# Patient Record
Sex: MALE | Race: WHITE | NOT HISPANIC OR LATINO | Employment: FULL TIME | URBAN - METROPOLITAN AREA
[De-identification: names, ages, dates, MRNs, and addresses within clinical notes are randomized per-mention and may not be internally consistent; named-entity substitution may affect disease eponyms.]

---

## 2017-04-01 DIAGNOSIS — I10 ESSENTIAL HYPERTENSION: ICD-10-CM

## 2017-04-03 RX ORDER — LISINOPRIL 10 MG/1
TABLET ORAL
Qty: 90 TABLET | Refills: 0 | OUTPATIENT
Start: 2017-04-03

## 2017-09-18 ENCOUNTER — OFFICE VISIT (OUTPATIENT)
Dept: INTERNAL MEDICINE | Facility: CLINIC | Age: 57
End: 2017-09-18
Payer: COMMERCIAL

## 2017-09-18 VITALS
SYSTOLIC BLOOD PRESSURE: 130 MMHG | OXYGEN SATURATION: 94 % | HEIGHT: 70 IN | BODY MASS INDEX: 34.53 KG/M2 | WEIGHT: 241.19 LBS | HEART RATE: 66 BPM | DIASTOLIC BLOOD PRESSURE: 82 MMHG

## 2017-09-18 DIAGNOSIS — Z12.11 ENCOUNTER FOR SCREENING COLONOSCOPY: ICD-10-CM

## 2017-09-18 DIAGNOSIS — Z00.00 ROUTINE HEALTH MAINTENANCE: Primary | ICD-10-CM

## 2017-09-18 DIAGNOSIS — Z23 NEED FOR TETANUS BOOSTER: ICD-10-CM

## 2017-09-18 DIAGNOSIS — Z11.59 NEED FOR HEPATITIS C SCREENING TEST: ICD-10-CM

## 2017-09-18 DIAGNOSIS — I10 ESSENTIAL HYPERTENSION: ICD-10-CM

## 2017-09-18 PROCEDURE — 99999 PR PBB SHADOW E&M-EST. PATIENT-LVL III: CPT | Mod: PBBFAC,,, | Performed by: INTERNAL MEDICINE

## 2017-09-18 PROCEDURE — 99386 PREV VISIT NEW AGE 40-64: CPT | Mod: S$GLB,,, | Performed by: INTERNAL MEDICINE

## 2017-09-18 RX ORDER — LISINOPRIL 20 MG/1
20 TABLET ORAL DAILY
Qty: 30 TABLET | Refills: 0 | Status: SHIPPED | OUTPATIENT
Start: 2017-09-18 | End: 2017-10-03 | Stop reason: DRUGHIGH

## 2017-09-18 NOTE — PROGRESS NOTES
Subjective:       Patient ID: Jenni Prescott Jr. is a 57 y.o. male.    Chief Complaint: Annual Exam (new pt )    HPI Mr. Prescott is a 57 year old male with hypertension who presents to hospitals care. Mr. Prescott has had very elevated blood pressures in the past such as 150s-160s systolic, 90s-100s diastolic. His last primary care provider started him on lisinopril 10 mg daily and had to increase this to 20 mg daily when his BP continued to be 150s/110s. Today, he reports that he hasn't taken lisinopril in a few weeks. His recent BP checks at home have been similar to our BP here today (130/80s). I repeated a manual BP and the reading was 140/85. He denied any issues when he took the lisinopril such as low Bps or dizziness. He has no CV complaints such as chest pain, leg swelling, shortness of breath, wheezing.    No complaints today.    Review of Systems   Constitutional: Negative for chills and fever.   HENT: Negative for rhinorrhea, sinus pain and sinus pressure.    Eyes: Negative for pain and redness.   Respiratory: Negative for cough, choking, shortness of breath and wheezing.    Cardiovascular: Negative for chest pain and leg swelling.   Gastrointestinal: Negative for diarrhea, nausea and vomiting.   Genitourinary: Negative for difficulty urinating, dysuria and hematuria.   Musculoskeletal: Negative for joint swelling.   Skin: Negative for color change and rash.   Neurological: Negative for dizziness, seizures and headaches.   Psychiatric/Behavioral: Negative for suicidal ideas. The patient is not nervous/anxious.        Objective:      Physical Exam   Constitutional: He is oriented to person, place, and time. He appears well-developed and well-nourished. No distress.   HENT:   Head: Normocephalic and atraumatic.   Right Ear: External ear normal.   Left Ear: External ear normal.   Nose: Nose normal.   Mouth/Throat: Oropharynx is clear and moist. No oropharyngeal exudate.   Eyes: Conjunctivae and EOM are  normal. Pupils are equal, round, and reactive to light. Right eye exhibits no discharge. Left eye exhibits no discharge. No scleral icterus.   Neck: Neck supple. No tracheal deviation present. No thyromegaly present.   Cardiovascular: Normal rate, regular rhythm, normal heart sounds and intact distal pulses.  Exam reveals no gallop and no friction rub.    No murmur heard.  Pulmonary/Chest: Effort normal and breath sounds normal. No respiratory distress. He has no wheezes. He has no rales.   Abdominal: Soft. Bowel sounds are normal. He exhibits no distension and no mass. There is no tenderness. There is no rebound and no guarding.   Musculoskeletal: He exhibits no edema or deformity.   Lymphadenopathy:     He has no cervical adenopathy.   Neurological: He is alert and oriented to person, place, and time.   Skin: Skin is warm and dry. He is not diaphoretic.   Psychiatric: He has a normal mood and affect. His behavior is normal. Judgment and thought content normal.       Assessment:       1. Routine health maintenance    2. Essential hypertension    3. Encounter for screening colonoscopy    4. Need for tetanus booster    5. Need for hepatitis C screening test        Plan:     1. Routine health maintenance  -Lipids, CBC, CMP, A1C, Hep C antibody  -Discussed with patient that there is not a general concsensus regarding prostate screening. While prostate cancer may be detected earlier, there is also the chance of false positive which would lead to urological evaluation and work up.  -After discussing the pros and cons, Mr. Prescott would like to proceed with PSA screening  -Screening colonoscopy ordered    2. Essential HTN  -Patient's goal <140/90  -Have trended his Bps and most were above goal  -Will restart lisinopril 20 mg daily  -Patient will check his BP at home and keep a log which he will return to clinic with in 2 weeks  -Advised to call if any questions or concerns. And to let us know if he becomes dizzy and/or has  low BP at home  -RTC 2 weeks for BP check      RTC 2 weeks

## 2017-09-25 ENCOUNTER — TELEPHONE (OUTPATIENT)
Dept: INTERNAL MEDICINE | Facility: CLINIC | Age: 57
End: 2017-09-25

## 2017-09-25 NOTE — TELEPHONE ENCOUNTER
Called pt no answer left voicemail that we don't have forms to renew cdl and that [facility] one block from [employer] [facility] Address: [address] [place] [address]  Hours: Open today · 8AM-5PM  Phone: [phone]   does the cdl. I left address, phone numbers, and hours of business on voicemail message.

## 2017-09-25 NOTE — TELEPHONE ENCOUNTER
----- Message from Nelida Blunt sent at 9/25/2017  8:37 AM CDT -----  Contact: mrs. polanco [phone]  Mrs. polanco states patient was seen last for a physical and would like to know if you have the forms to fill out  To renew cdl , please call

## 2017-09-26 ENCOUNTER — LAB VISIT (OUTPATIENT)
Dept: LAB | Facility: HOSPITAL | Age: 57
End: 2017-09-26
Attending: INTERNAL MEDICINE
Payer: COMMERCIAL

## 2017-09-26 ENCOUNTER — CLINICAL SUPPORT (OUTPATIENT)
Dept: FAMILY MEDICINE | Facility: CLINIC | Age: 57
End: 2017-09-26
Payer: COMMERCIAL

## 2017-09-26 DIAGNOSIS — Z00.00 ROUTINE HEALTH MAINTENANCE: ICD-10-CM

## 2017-09-26 DIAGNOSIS — I10 ESSENTIAL HYPERTENSION: ICD-10-CM

## 2017-09-26 DIAGNOSIS — Z11.59 NEED FOR HEPATITIS C SCREENING TEST: ICD-10-CM

## 2017-09-26 LAB
ALBUMIN SERPL BCP-MCNC: 4 G/DL
ALP SERPL-CCNC: 72 U/L
ALT SERPL W/O P-5'-P-CCNC: 31 U/L
ANION GAP SERPL CALC-SCNC: 9 MMOL/L
AST SERPL-CCNC: 22 U/L
BASOPHILS # BLD AUTO: 0.03 K/UL
BASOPHILS NFR BLD: 0.4 %
BILIRUB SERPL-MCNC: 1.1 MG/DL
BUN SERPL-MCNC: 21 MG/DL
CALCIUM SERPL-MCNC: 9.1 MG/DL
CHLORIDE SERPL-SCNC: 106 MMOL/L
CHOLEST SERPL-MCNC: 206 MG/DL
CHOLEST/HDLC SERPL: 6.2 {RATIO}
CO2 SERPL-SCNC: 27 MMOL/L
COMPLEXED PSA SERPL-MCNC: 0.61 NG/ML
CREAT SERPL-MCNC: 0.9 MG/DL
DIFFERENTIAL METHOD: ABNORMAL
EOSINOPHIL # BLD AUTO: 0.1 K/UL
EOSINOPHIL NFR BLD: 1.2 %
ERYTHROCYTE [DISTWIDTH] IN BLOOD BY AUTOMATED COUNT: 14.6 %
EST. GFR  (AFRICAN AMERICAN): >60 ML/MIN/1.73 M^2
EST. GFR  (NON AFRICAN AMERICAN): >60 ML/MIN/1.73 M^2
ESTIMATED AVG GLUCOSE: 117 MG/DL
GLUCOSE SERPL-MCNC: 108 MG/DL
HBA1C MFR BLD HPLC: 5.7 %
HCT VFR BLD AUTO: 46.8 %
HDLC SERPL-MCNC: 33 MG/DL
HDLC SERPL: 16 %
HGB BLD-MCNC: 15.5 G/DL
LDLC SERPL CALC-MCNC: 127.6 MG/DL
LYMPHOCYTES # BLD AUTO: 2.2 K/UL
LYMPHOCYTES NFR BLD: 29 %
MCH RBC QN AUTO: 29.6 PG
MCHC RBC AUTO-ENTMCNC: 33.1 G/DL
MCV RBC AUTO: 90 FL
MONOCYTES # BLD AUTO: 0.5 K/UL
MONOCYTES NFR BLD: 6.6 %
NEUTROPHILS # BLD AUTO: 4.9 K/UL
NEUTROPHILS NFR BLD: 62.7 %
NONHDLC SERPL-MCNC: 173 MG/DL
PLATELET # BLD AUTO: 198 K/UL
PMV BLD AUTO: 11 FL
POTASSIUM SERPL-SCNC: 4.3 MMOL/L
PROT SERPL-MCNC: 7.2 G/DL
RBC # BLD AUTO: 5.23 M/UL
SODIUM SERPL-SCNC: 142 MMOL/L
TRIGL SERPL-MCNC: 227 MG/DL
WBC # BLD AUTO: 7.73 K/UL

## 2017-09-26 PROCEDURE — 83036 HEMOGLOBIN GLYCOSYLATED A1C: CPT

## 2017-09-26 PROCEDURE — 90715 TDAP VACCINE 7 YRS/> IM: CPT | Mod: S$GLB,,, | Performed by: INTERNAL MEDICINE

## 2017-09-26 PROCEDURE — 80061 LIPID PANEL: CPT

## 2017-09-26 PROCEDURE — 84153 ASSAY OF PSA TOTAL: CPT

## 2017-09-26 PROCEDURE — 86803 HEPATITIS C AB TEST: CPT

## 2017-09-26 PROCEDURE — 36415 COLL VENOUS BLD VENIPUNCTURE: CPT | Mod: PO

## 2017-09-26 PROCEDURE — 90471 IMMUNIZATION ADMIN: CPT | Mod: S$GLB,,, | Performed by: INTERNAL MEDICINE

## 2017-09-26 PROCEDURE — 80053 COMPREHEN METABOLIC PANEL: CPT

## 2017-09-26 PROCEDURE — 85025 COMPLETE CBC W/AUTO DIFF WBC: CPT

## 2017-09-27 LAB — HCV AB SERPL QL IA: NEGATIVE

## 2017-10-03 ENCOUNTER — OFFICE VISIT (OUTPATIENT)
Dept: INTERNAL MEDICINE | Facility: CLINIC | Age: 57
End: 2017-10-03
Payer: COMMERCIAL

## 2017-10-03 ENCOUNTER — TELEPHONE (OUTPATIENT)
Dept: GASTROENTEROLOGY | Facility: CLINIC | Age: 57
End: 2017-10-03

## 2017-10-03 ENCOUNTER — TELEPHONE (OUTPATIENT)
Dept: INTERNAL MEDICINE | Facility: CLINIC | Age: 57
End: 2017-10-03

## 2017-10-03 VITALS
SYSTOLIC BLOOD PRESSURE: 160 MMHG | DIASTOLIC BLOOD PRESSURE: 100 MMHG | HEIGHT: 70 IN | HEART RATE: 72 BPM | WEIGHT: 241 LBS | BODY MASS INDEX: 34.5 KG/M2

## 2017-10-03 DIAGNOSIS — E78.5 HYPERLIPIDEMIA, UNSPECIFIED HYPERLIPIDEMIA TYPE: ICD-10-CM

## 2017-10-03 DIAGNOSIS — I10 ESSENTIAL HYPERTENSION: Primary | ICD-10-CM

## 2017-10-03 DIAGNOSIS — R73.03 PREDIABETES: ICD-10-CM

## 2017-10-03 PROCEDURE — 99999 PR PBB SHADOW E&M-EST. PATIENT-LVL III: CPT | Mod: PBBFAC,,, | Performed by: INTERNAL MEDICINE

## 2017-10-03 PROCEDURE — 99213 OFFICE O/P EST LOW 20 MIN: CPT | Mod: S$GLB,,, | Performed by: INTERNAL MEDICINE

## 2017-10-03 RX ORDER — LISINOPRIL AND HYDROCHLOROTHIAZIDE 12.5; 2 MG/1; MG/1
1 TABLET ORAL DAILY
Qty: 90 TABLET | Refills: 3 | Status: SHIPPED | OUTPATIENT
Start: 2017-10-03 | End: 2017-11-08 | Stop reason: SDUPTHER

## 2017-10-03 RX ORDER — ATORVASTATIN CALCIUM 20 MG/1
20 TABLET, FILM COATED ORAL DAILY
Qty: 90 TABLET | Refills: 3 | Status: SHIPPED | OUTPATIENT
Start: 2017-10-03 | End: 2018-10-22 | Stop reason: SDUPTHER

## 2017-10-03 NOTE — TELEPHONE ENCOUNTER
----- Message from Radha Antoine MD sent at 10/3/2017  8:49 AM CDT -----  Do we have any way to check on Mr. Prescott's colonoscopy scheduling? He was seen 2 weeks ago and says he still doesn't have appt.    Thank you!

## 2017-10-03 NOTE — TELEPHONE ENCOUNTER
----- Message from Zee Oneil LPN sent at 10/3/2017 10:52 AM CDT -----  Contact: Self [phone] or [phone]      ----- Message -----  From: Yue Echols  Sent: 10/3/2017   9:30 AM  To: Sean Vera Staff    Patient is calling to schedule a colonoscopy. Please advice

## 2017-10-03 NOTE — TELEPHONE ENCOUNTER
Called endo at  [phone] who gave me endo at [place] number [phone] where Katerin gave me the doctors office number [phone] & [phone] where I spoke to Yue and  they send a message to the endocrinologists who calls to schedule the appt. Yue azar has sent a message to the doctor who will call pt to schedule colonoscopy appt.

## 2017-10-03 NOTE — PROGRESS NOTES
Subjective:       Patient ID: Jenni Prescott Jr. is a 57 y.o. male.    Chief Complaint: Follow-up    HPI Mr. Prescott is a 57 year old male with a history of hypertension which was diagnosed about 5 years ago. Since that time, he has only taken lisinopril for treatment. During our last visit 2 weeks ago, I increased his dose back to lisinopril 20 mg. He says he has been taking the medication since that time with no missed doses. When he checks his BP at home, it is still high. The lowest he has ever seen the systolic was in the 130s. His diastolic is usually in the 100-120s range. He denies any symptoms during these times such as dizziness, headaches, ligthheadedness, vision changes, chest pain. He has no symptoms currently and feels fine today. Office Bps today were 160/100 and 170/95.    While Mr. Prescott was in the office, we have reviewed his labs from last visit. We have discussed diet and exercise as this could improve his blood pressure, A1c and lipid values. He does not eat fried foods. But he does eat large portions of food and drinks alcohol. He still stop drinking and will reduce food portions. He does not do any exercise currently but he used to run 5 miles per day. He will start exercising by walking for 30 minutes per day and working up to 5 days per week.    Review of Systems   Constitutional: Negative for fever.   HENT: Negative for rhinorrhea.    Eyes: Negative for visual disturbance.   Respiratory: Negative for shortness of breath.    Cardiovascular: Positive for palpitations. Negative for chest pain.   Gastrointestinal: Negative for nausea and vomiting.   Genitourinary: Negative for dysuria and hematuria.   Musculoskeletal: Negative for back pain and joint swelling.   Skin: Negative for rash.   Neurological: Negative for dizziness, light-headedness and headaches.       Objective:      Physical Exam   Constitutional: He is oriented to person, place, and time. He appears well-developed and  well-nourished. No distress.   HENT:   Head: Normocephalic and atraumatic.   Eyes: Conjunctivae and EOM are normal. Right eye exhibits no discharge. Left eye exhibits no discharge.   Cardiovascular: Normal rate, regular rhythm, normal heart sounds and intact distal pulses.  Exam reveals no gallop and no friction rub.    No murmur heard.  Pulmonary/Chest: Effort normal and breath sounds normal. No respiratory distress. He has no wheezes. He has no rales.   Musculoskeletal: He exhibits no edema.   Neurological: He is alert and oriented to person, place, and time.   Skin: Skin is warm and dry. He is not diaphoretic.   Psychiatric: He has a normal mood and affect. His behavior is normal. Judgment and thought content normal.   Vitals reviewed.      Assessment:       1. Essential hypertension    2. Hyperlipidemia, unspecified hyperlipidemia type    3. Prediabetes        Plan:     1. Essential hypertension  -Mr. Prescott has stage II hypertension  -Adding HCTZ (12.5 mg) to his lisinopril  -Counseled him on signs/symptoms of low blood pressure (dizziness, sweating, pre-syncope, syncope) and elevated BP (chest pain, shortness of breath, vision changes, headaches) and he is aware to seek medical attention if these occur.  -He will follow up in one week for a blood pressure check. If still elevated, can increase the lisinopril dose or the HCTZ dose as there is room to increase for both.    2. HLD  -ASCVD 10 year risk is 14%  -Starting moderate intensity statin (atorvastatin 20 mg)  -LFTs WNL   -Patient counseled on potential side effects of this medication such as muscle cramps, soreness    3. Pre-diabetes  -A1C 5.7  -Discussed the importance of improving diet (fewer starches, discontinue alcohol, portion control) and exercising in order to improve his A1C number     RTC in one week for BP check.   If elevated, I will see Mr. Prescott and adjust his medications. If not elevated, plan to make appt for 3 months follow up with lipids  done at the 3 month follow up visit

## 2017-10-09 ENCOUNTER — TELEPHONE (OUTPATIENT)
Dept: INTERNAL MEDICINE | Facility: CLINIC | Age: 57
End: 2017-10-09

## 2017-10-09 NOTE — TELEPHONE ENCOUNTER
Called pt, left voicemail adv Dr. Antoine is out of the office and appt for tomorrow is canceled but can be scheduled with another provider, left number [phone] for pt to call back regarding scheduling another appt.

## 2017-10-31 ENCOUNTER — TELEPHONE (OUTPATIENT)
Dept: GASTROENTEROLOGY | Facility: CLINIC | Age: 57
End: 2017-10-31

## 2017-10-31 NOTE — TELEPHONE ENCOUNTER
Referral was sent from Dr. Antoine to schedule patient for an Colonoscopy, patient states that they will call back at a later time.

## 2017-11-08 ENCOUNTER — OFFICE VISIT (OUTPATIENT)
Dept: FAMILY MEDICINE | Facility: CLINIC | Age: 57
End: 2017-11-08
Payer: COMMERCIAL

## 2017-11-08 VITALS
SYSTOLIC BLOOD PRESSURE: 142 MMHG | DIASTOLIC BLOOD PRESSURE: 88 MMHG | HEIGHT: 70 IN | BODY MASS INDEX: 32.67 KG/M2 | TEMPERATURE: 98 F | OXYGEN SATURATION: 96 % | HEART RATE: 54 BPM | WEIGHT: 228.19 LBS

## 2017-11-08 DIAGNOSIS — K64.8 HEMORRHOIDS, INTERNAL, WITH BLEEDING: ICD-10-CM

## 2017-11-08 DIAGNOSIS — M79.2 NEUROPATHIC PAIN: ICD-10-CM

## 2017-11-08 DIAGNOSIS — E78.5 HYPERLIPIDEMIA, UNSPECIFIED HYPERLIPIDEMIA TYPE: ICD-10-CM

## 2017-11-08 DIAGNOSIS — R89.9 ABNORMAL LABORATORY TEST RESULT: ICD-10-CM

## 2017-11-08 DIAGNOSIS — Z12.11 COLON CANCER SCREENING: ICD-10-CM

## 2017-11-08 DIAGNOSIS — I10 ESSENTIAL HYPERTENSION: Primary | ICD-10-CM

## 2017-11-08 DIAGNOSIS — R73.01 ELEVATED FASTING GLUCOSE: ICD-10-CM

## 2017-11-08 PROCEDURE — 99999 PR PBB SHADOW E&M-EST. PATIENT-LVL IV: CPT | Mod: PBBFAC,,, | Performed by: FAMILY MEDICINE

## 2017-11-08 PROCEDURE — 99215 OFFICE O/P EST HI 40 MIN: CPT | Mod: S$GLB,,, | Performed by: FAMILY MEDICINE

## 2017-11-08 RX ORDER — LISINOPRIL AND HYDROCHLOROTHIAZIDE 12.5; 2 MG/1; MG/1
TABLET ORAL
Qty: 90 TABLET | Refills: 2 | Status: SHIPPED | OUTPATIENT
Start: 2017-11-08 | End: 2018-09-20

## 2017-11-08 RX ORDER — PREGABALIN 75 MG/1
75 CAPSULE ORAL 2 TIMES DAILY
Qty: 180 CAPSULE | Refills: 2 | Status: SHIPPED | OUTPATIENT
Start: 2017-11-08 | End: 2017-11-30

## 2017-11-08 RX ORDER — PREGABALIN 75 MG/1
75 CAPSULE ORAL 2 TIMES DAILY
Qty: 180 CAPSULE | Refills: 2 | Status: SHIPPED | OUTPATIENT
Start: 2017-11-08 | End: 2017-11-08 | Stop reason: SDUPTHER

## 2017-11-08 NOTE — PATIENT INSTRUCTIONS
Please decrease your intake of white bread, white rice, corn, pasta, potatoes and sugar to prevent diabetes. Regular daily exercise will also decrease your risk of developing diabetes.      Prediabetes  You have been diagnosed with prediabetes. This means that the level of sugar (glucose) in your blood is too high. If you have prediabetes, you are at risk for developing type 2 diabetes. Type 2 diabetes is diagnosed when the level of glucose in the blood reaches a certain high level. With prediabetes, it hasnt reached this point yet, but it is higher than normal. It is vital to make lifestyle changes to lower your blood sugar, improve your health, and prevent diabetes. This sheet will tell you more.      Why worry about prediabetes?  Prediabetes is a disease where the bodys cells have trouble using glucose in the blood for energy. As a result, too much glucose stays in the blood and can affect how your heart and blood vessels work. Without changes in diet and lifestyle, the problem can get worse. Once you have type 2 diabetes, it is chronic (ongoing) and needs to be managed for the rest of your life. Diabetes can harm the body and your health by damaging organs, such as your eyes and kidneys. It makes you more likely to have heart disease. And it can damage nerves and blood vessels.  Who is a risk for prediabetes?  The exact cause of prediabetes is not clear. But certain risk factors make a person more likely to have it. These include:  · A family history of type 2 diabetes  · Being overweight  · Being over age 45  · Have hypertension or elevated cholesterol   · Having had gestational diabetes  · Not being physically active  · Being ,  American, , , , or   Diagnosing prediabetes  Prediabetes may have no symptoms or you may have some of the symptoms of diabetes. The diagnosis is made with a blood test. You may have one or more of these blood  tests:   · Fasting glucose test. Blood is taken and tested after you have fasted (not eaten) for at least 8 hours. A normal test result is 99 milligrams per deciliter (mg/dL) or lower. Prediabetes is 100 mg/dL to 125 mg/dL. Diabetes is 126 mg/dL or higher.  · Glucose tolerance test. Your blood sugar is measured before and after you drink a very sugary liquid. A normal test result is 139 milligrams per deciliter (mg/dL) or lower. Prediabetes is 140 mg/dL to 199 mg/dL. Diabetes is 200 mg/dL or higher.  · Hemoglobin A1c (HbA1c). Your HbA1c is normal if it is below 5.7%. Prediabetes is 5.7% to 6.4%. Diabetes is 6.5% or higher.   Treating prediabetes  The best way to treat prediabetes is to lose at least 5% to 7% of your current weight and be more physically active by getting at least 150 minutes a week of physical activity. When sitting for long periods of time, get up for short sessions of light activity every 30 minutes. These changes help the bodys cells use blood sugar better. Even a small amount of weight loss can help. Work with your healthcare provider to make a plan to eat well and be more active. Keep in mind that small changes can add up. Other changes in your lifestyle (or even taking certain medicines, such as metformin) may make you less likely to develop diabetes. Your healthcare provider can talk with you about these.  Follow-up  If it is untreated, prediabetes can turn into diabetes. This is a serious health condition. Take steps to stop this from happening. Follow the treatment plan you have been given. You may have your blood glucose tested again in about 12 to 18 months.  Symptoms of diabetes  Let your healthcare provider know if you have any of the following:  · Always feel very tired  · Feel very thirsty or hungry much of the time  · Have to urinate often  · Lose weight for no reason  · Feel numbness or tingling in your fingers or toes  · Have cuts or bruises that dont seem to heal  · Have blurry  vision   Date Last Reviewed: 5/1/2016  © 4321-4462 [employer] [address] [place] [address], [place] [address]. All rights reserved. This information is not intended as a substitute for professional medical care. Always follow your healthcare professional's instructions.            4 Steps for Eating Healthier  Changing the way you eat can improve your health. It can lower your cholesterol and blood pressure, and help you stay at a healthy weight. Your diet doesnt have to be bland and boring to be healthy. Just watch your calories and follow these steps:    1. Eat fewer unhealthy fats  · Choose more fish and lean meats instead of fatty cuts of meat.  · Skip butter and lard, and use less margarine.  · Pass on foods that have palm, coconut, or hydrogenated oils.  · Eat fewer high-fat dairy foods like cheese, ice cream, and whole milk.  · Get a heart-healthy cookbook and try some low-fat recipes.  2. Go light on salt  · Keep the saltshaker off the table.  · Limit high-salt ingredients, such as soy sauce, bouillon, and garlic salt.  · Instead of adding salt when cooking, season your food with herbs and flavorings. Try lemon, garlic, and onion.  · Limit convenience foods, such as boxed or canned foods and restaurant food.  · Read food labels and choose lower-sodium options.  3. Limit sugar  · Pause before you add sugars to pancakes, cereal, coffee, or tea. This includes white and brown table sugar, syrup, honey, and molasses. Cut your usual amount by half.  · Use non-sugar sweeteners. Stevia, aspartame, and sucralose can satisfy a sweet tooth without adding calories.  · Swap out sugar-filled soda and other drinks. Buy sugar-free or low-calorie beverages. Remember water is always the best choice.  · Read labels and choose foods with less added sugar. Keep in mind that dairy foods and foods with fruit will have some natural sugar.  · Cut the sugar in recipes by 1/3 to 1/2. Boost the flavor with extracts like  almond, vanilla, or orange. Or add spices such as cinnamon or nutmeg.  4. Eat more fiber  · Eat fresh fruits and vegetables every day.  · Boost your diet with whole grains. Go for oats, whole-grain rice, and bran.  · Add beans and lentils to your meals.  · Drink more water to match your fiber increase. This is to help prevent constipation.  Date Last Reviewed: 5/11/2015  © 3595-7529 [employer]. [address] [place] [address]. All rights reserved. This information is not intended as a substitute for professional medical care. Always follow your healthcare professional's instructions.        Low-Salt Diet  This diet removes foods that are high in salt. It also limits the amount of salt you use when cooking. It is most often used for people with high blood pressure, edema (fluid retention), and kidney, liver, or heart disease.  Table salt contains the mineral sodium. Your body needs sodium to work normally. But too much sodium can make your health problems worse. Your healthcare provider is recommending a low-salt (also called low-sodium) diet for you. Your total daily allowance of salt is 1,500 to 2,300 milligrams (mg). It is less than 1 teaspoon of table salt. This means you can have only about 500 to 700 mg of sodium at each meal. People with certain health problems should limit salt intake to the lower end of the recommended range.    When you cook, dont add much salt. If you can cook without using salt, even better. Dont add salt to your food at the table.  When shopping, read food labels. Salt is often called sodium on the label. Choose foods that are salt-free, low salt, or very low salt. Note that foods with reduced salt may not lower your salt intake enough.    Beans, potatoes, and pasta  Ok: Dry beans, split peas, lentils, potatoes, rice, macaroni, pasta, spaghetti without added salt  Avoid: Potato chips, tortilla chips, and similar products  Breads and cereals  Ok: Low-sodium  breads, rolls, cereals, and cakes; low-salt crackers, matzo crackers  Avoid: Salted crackers, pretzels, popcorn, [place] toast, pancakes, muffins  Dairy  Ok: Milk, chocolate milk, hot chocolate mix, low-salt cheeses, and yogurt  Avoid: Processed cheese and cheese spreads; Roquefort, Camembert, and cottage cheese; buttermilk, instant breakfast drink  Desserts  Ok: Ice cream, frozen yogurt, juice bars, gelatin, cookies and pies, sugar, honey, jelly, hard candy  Avoid: Most pies, cakes and cookies prepared or processed with salt; instant pudding  Drinks  Ok: Tea, coffee, fizzy (carbonated) drinks, juices  Avoid: Flavored coffees, electrolyte replacement drinks, sports drinks  Meats  Ok: All fresh meat, fish, poultry, low-salt tuna, eggs, egg substitute  Avoid: Smoked, pickled, brine-cured, or salted meats and fish. This includes díaz, chipped beef, corned beef, hot dogs, deli meats, ham, kosher meats, salt pork, sausage, canned tuna, salted codfish, smoked salmon, herring, sardines, or anchovies.  Seasonings and spices  Ok: Most seasonings are okay. Good substitutes for salt include: fresh herb blends, hot sauce, lemon, garlic, jimenez, vinegar, dry mustard, parsley, cilantro, horseradish, tomato paste, regular margarine, mayonnaise, unsalted butter, cream cheese, vegetable oil, cream, low-salt salad dressing and gravy.  Avoid: Regular ketchup, relishes, pickles, soy sauce, teriyaki sauce, Worcestershire sauce, BBQ sauce, tartar sauce, meat tenderizer, chili sauce, regular gravy, regular salad dressing, salted butter  Soups  Ok: Low-salt soups and broths made with allowed foods  Avoid: Bouillon cubes, soups with smoked or salted meats, regular soup and broth  Vegetables  Ok: Most vegetables are okay; also low-salt tomato and vegetable juices  Avoid: Sauerkraut and other brine-soaked vegetables; pickles and other pickled vegetables; tomato juice, olives  Date Last Reviewed: 8/1/2016  © 8666-7612 [employer]  [address] [place] [address]. All rights reserved. This information is not intended as a substitute for professional medical care. Always follow your healthcare professional's instructions.

## 2017-11-08 NOTE — PROGRESS NOTES
"Subjective:       Patient ID: Jenni Prescott Jr. is a 57 y.o. male.    Chief Complaint: Hypertension; Hemorrhoids; and Leg Pain (right)    Jenni is a 57 y.o. Male who presents today for follow up of Blood pressure. He did not take his medicine this morning. He has been checking his BP at home a few times/week and its usually in the 130's/80's.     Has tolerated statin well. No muscle aches. No other side effects.     Discussed diet and exercise again. Importance of not smoking anything discussed again.     Patient is also presenting with hemorrhoids. They have been recently bleeding in the last few weeks. They are external per patient however he states that they are non painful and "go back in" when he is not stooling. No change in his stool, its soft, never has to strain. No change in the shape of his stool. Never has tried anything for these. He is having a large amount of prbpr.     He states that he also has a numbness in his thigh that has been going on since the knee surgery, about 10 years ago. But recently, he is having some burning/tingling in that area. The burning is sometimes; it has unknown triggers. Unknown what makes it stop.     Denies any fevers, chills, nausea, vomiting.       Hypertension   This is a new problem. The current episode started more than 1 month ago. The problem is controlled. Pertinent negatives include no anxiety, blurred vision, chest pain, headaches, palpitations, peripheral edema or shortness of breath. There are no associated agents to hypertension. Risk factors for coronary artery disease include male gender. Past treatments include ACE inhibitors and diuretics. The current treatment provides significant improvement.   Leg Pain    The incident occurred more than 1 week ago. The pain is present in the right thigh. Associated symptoms include a loss of sensation, numbness and tingling. Pertinent negatives include no inability to bear weight, loss of motion or muscle " weakness.   Hyperlipidemia   This is a new problem. The current episode started more than 1 month ago. The problem is uncontrolled. Recent lipid tests were reviewed and are high. Exacerbating diseases include obesity. Associated symptoms include leg pain. Pertinent negatives include no chest pain, myalgias or shortness of breath. Current antihyperlipidemic treatment includes statins. There are no compliance problems.  Risk factors for coronary artery disease include dyslipidemia, male sex and obesity.     Review of Systems   Constitutional: Negative for fatigue, fever and unexpected weight change.   Eyes: Negative for blurred vision and visual disturbance.   Respiratory: Negative for shortness of breath.    Cardiovascular: Negative for chest pain and palpitations.   Gastrointestinal: Positive for anal bleeding. Negative for blood in stool, constipation, diarrhea, nausea and vomiting.   Endocrine: Negative for polyuria.   Genitourinary: Negative for difficulty urinating.   Musculoskeletal: Negative for myalgias.   Skin: Negative for rash.   Neurological: Positive for tingling and numbness. Negative for headaches.        Tingling/burning sensation in right thigh   Hematological: Does not bruise/bleed easily.             Results for orders placed or performed in visit on 09/26/17   Hepatitis C antibody   Result Value Ref Range    Hepatitis C Ab Negative    PSA, Screening   Result Value Ref Range    PSA, SCREEN 0.61 0.00 - 4.00 ng/mL   Lipid panel   Result Value Ref Range    Cholesterol 206 (H) 120 - 199 mg/dL    Triglycerides 227 (H) 30 - 150 mg/dL    HDL 33 (L) 40 - 75 mg/dL    LDL Cholesterol 127.6 63.0 - 159.0 mg/dL    HDL/Chol Ratio 16.0 (L) 20.0 - 50.0 %    Total Cholesterol/HDL Ratio 6.2 (H) 2.0 - 5.0    Non-HDL Cholesterol 173 mg/dL   Comprehensive metabolic panel   Result Value Ref Range    Sodium 142 136 - 145 mmol/L    Potassium 4.3 3.5 - 5.1 mmol/L    Chloride 106 95 - 110 mmol/L    CO2 27 23 - 29 mmol/L     Glucose 108 70 - 110 mg/dL    BUN, Bld 21 (H) 6 - 20 mg/dL    Creatinine 0.9 0.5 - 1.4 mg/dL    Calcium 9.1 8.7 - 10.5 mg/dL    Total Protein 7.2 6.0 - 8.4 g/dL    Albumin 4.0 3.5 - 5.2 g/dL    Total Bilirubin 1.1 (H) 0.1 - 1.0 mg/dL    Alkaline Phosphatase 72 55 - 135 U/L    AST 22 10 - 40 U/L    ALT 31 10 - 44 U/L    Anion Gap 9 8 - 16 mmol/L    eGFR if African American >60.0 >60 mL/min/1.73 m^2    eGFR if non African American >60.0 >60 mL/min/1.73 m^2   Hemoglobin A1c   Result Value Ref Range    Hemoglobin A1C 5.7 (H) 4.0 - 5.6 %    Estimated Avg Glucose 117 68 - 131 mg/dL   CBC auto differential   Result Value Ref Range    WBC 7.73 3.90 - 12.70 K/uL    RBC 5.23 4.60 - 6.20 M/uL    Hemoglobin 15.5 14.0 - 18.0 g/dL    Hematocrit 46.8 40.0 - 54.0 %    MCV 90 82 - 98 fL    MCH 29.6 27.0 - 31.0 pg    MCHC 33.1 32.0 - 36.0 g/dL    RDW 14.6 (H) 11.5 - 14.5 %    Platelets 198 150 - 350 K/uL    MPV 11.0 9.2 - 12.9 fL    Gran # 4.9 1.8 - 7.7 K/uL    Lymph # 2.2 1.0 - 4.8 K/uL    Mono # 0.5 0.3 - 1.0 K/uL    Eos # 0.1 0.0 - 0.5 K/uL    Baso # 0.03 0.00 - 0.20 K/uL    Gran% 62.7 38.0 - 73.0 %    Lymph% 29.0 18.0 - 48.0 %    Mono% 6.6 4.0 - 15.0 %    Eosinophil% 1.2 0.0 - 8.0 %    Basophil% 0.4 0.0 - 1.9 %    Differential Method Automated        Objective:     Vitals:    11/08/17 0915   BP: (!) 142/88   Pulse: (!) 54   Temp: 98.2 °F (36.8 °C)        Physical Exam   Constitutional: He is oriented to person, place, and time. He appears well-developed and well-nourished.   HENT:   Head: Normocephalic and atraumatic.   Cardiovascular: Normal rate and regular rhythm.    Pulmonary/Chest: Effort normal and breath sounds normal.   Genitourinary:   Genitourinary Comments: Exam deferred today, referral to GI pending   Musculoskeletal: Normal range of motion. He exhibits no edema.        Legs:  Neurological: He is alert and oriented to person, place, and time. A sensory deficit is present.   Skin: Skin is warm. Capillary refill takes  less than 2 seconds.   Psychiatric: He has a normal mood and affect. His behavior is normal. Judgment and thought content normal.   Nursing note and vitals reviewed.      Assessment:       1. Essential hypertension    2. Elevated fasting glucose    3. Hyperlipidemia, unspecified hyperlipidemia type    4. Colon cancer screening    5. Abnormal laboratory test result    6. BMI 32.0-32.9,adult    7. Hemorrhoids, internal, with bleeding    8. Neuropathic pain        Plan:         Elevated fasting glucose  Please decrease your intake of white bread, white rice, corn, pasta, potatoes and sugar to prevent diabetes. Regular daily exercise will also decrease your risk of developing diabetes.    Essential hypertension  Patient did not take BP medication this morning  Logs from home appear to be normal, continue current medication  -     lisinopril-hydrochlorothiazide (PRINZIDE,ZESTORETIC) 20-12.5 mg per tablet; Take one tablet every day for elevated blood pressure  Dispense: 90 tablet; Refill: 2    Hyperlipidemia, unspecified hyperlipidemia type  Continue statin    Colon cancer screening  -     Ambulatory referral to Gastroenterology    Abnormal laboratory test result  Reviewed lab results with the patient including elevated A1c and cholesterol along with importance of medication adherence    BMI 32.0-32.9,adult  Discussed diet and exercise  Counseling provided    Hemorrhoids, internal, with bleeding  Referral to Gi, exam deferred  -     Ambulatory referral to Gastroenterology    Neuropathic pain  Patient has pain, in the distribution of where he has had numbness in the past  Will treat as neuropathic pain  lyrica 75 mg bid  Follow up with PCP in 3 weeks  -     pregabalin (LYRICA) 75 MG capsule; Take 1 capsule (75 mg total) by mouth 2 (two) times daily.  Dispense: 180 capsule; Refill: 2    Warning signs discussed, patient to call with any further issues or worsening of symptoms.

## 2017-11-08 NOTE — ASSESSMENT & PLAN NOTE
Please decrease your intake of white bread, white rice, corn, pasta, potatoes and sugar to prevent diabetes. Regular daily exercise will also decrease your risk of developing diabetes.

## 2017-11-29 ENCOUNTER — TELEPHONE (OUTPATIENT)
Dept: GASTROENTEROLOGY | Facility: CLINIC | Age: 57
End: 2017-11-29

## 2017-11-29 NOTE — TELEPHONE ENCOUNTER
----- Message from Nicole Max sent at 11/29/2017 11:31 AM CST -----  Contact: [phone]/self  Patient requesting to speak with you regarding his appt tomorrow. Please call and advise.

## 2017-11-30 ENCOUNTER — INITIAL CONSULT (OUTPATIENT)
Dept: GASTROENTEROLOGY | Facility: CLINIC | Age: 57
End: 2017-11-30
Payer: COMMERCIAL

## 2017-11-30 VITALS
HEIGHT: 70 IN | SYSTOLIC BLOOD PRESSURE: 140 MMHG | WEIGHT: 224.88 LBS | BODY MASS INDEX: 32.19 KG/M2 | DIASTOLIC BLOOD PRESSURE: 91 MMHG | HEART RATE: 70 BPM

## 2017-11-30 DIAGNOSIS — K64.9 HEMORRHOIDS, UNSPECIFIED HEMORRHOID TYPE: ICD-10-CM

## 2017-11-30 DIAGNOSIS — Z12.11 COLON CANCER SCREENING: Primary | ICD-10-CM

## 2017-11-30 PROCEDURE — 99203 OFFICE O/P NEW LOW 30 MIN: CPT | Mod: S$GLB,,, | Performed by: NURSE PRACTITIONER

## 2017-11-30 PROCEDURE — 99999 PR PBB SHADOW E&M-EST. PATIENT-LVL III: CPT | Mod: PBBFAC,,, | Performed by: NURSE PRACTITIONER

## 2017-11-30 NOTE — LETTER
November 30, 2017      Victorino Jamil, DO  [address] [facility]  [place] [id number]           [facility]  [address] [place] [address]  [place] [address]  Phone: [phone]          Patient: Jenni Prescott Jr.   MR Number: [id number]   YOB: 1960   Date of Visit: 11/30/2017       Dear Dr. Victorino Jamil:    Thank you for referring Jenni Prescott to me for evaluation. Attached you will find relevant portions of my assessment and plan of care.    If you have questions, please do not hesitate to call me. I look forward to following Jenni Prescott along with you.    Sincerely,    Yue Paul, [facility]    Enclosure  CC:  No Recipients    If you would like to receive this communication electronically, please contact [email or web address] or [phone] to request more information on [facility] access.    For providers and/or their staff who would like to refer a patient to [facility], please contact us through our one-stop-shop provider referral line, Kimberlee Garcia, at [phone].    If you feel you have received this communication in error or would no longer like to receive these types of communications, please e-mail [email or web address]

## 2017-11-30 NOTE — PROGRESS NOTES
"Subjective:       Patient ID: Jenni Prescott Jr. is a 57 y.o. male.    Chief Complaint: Hemorrhoids    HPI  Reports episodic hemorrhoidal complaints with hemorrhoid "going in and out".  Denies pain but may have irritation if the hemorrhoid "stays out".  Episodic bright red rectal bleeding noted when wiping.  Reports regular bowel habit with soft and easy to pass stool without straining.  He does report that at times he may sit for 20 minutes to make sure he is empty before going to work;  If he is at home and not working, he reports he may have several bowel movements in the morning before feeling complete emptying.    Denies abdominal pain.  Denies black stools, nausea or vomiting.    Denies family history of colon cancer or colon polyps.  He has never had colonoscopy.    Review of Systems   Constitutional: Negative.  Negative for activity change, appetite change, fatigue, fever and unexpected weight change.   Respiratory: Negative for shortness of breath.    Cardiovascular: Negative for chest pain.   Gastrointestinal: Positive for anal bleeding and blood in stool. Negative for abdominal distention, abdominal pain, constipation, diarrhea, nausea, rectal pain and vomiting.   Genitourinary: Negative.    Musculoskeletal: Negative.    Skin: Negative.    Neurological: Negative.    Psychiatric/Behavioral: Negative.        Objective:      Physical Exam   Constitutional: He is oriented to person, place, and time. He appears well-developed and well-nourished. No distress.   HENT:   Head: Normocephalic.   Eyes: No scleral icterus.   Cardiovascular: Normal rate.    Pulmonary/Chest: Effort normal. No respiratory distress.   Abdominal: Soft. There is no tenderness.   Musculoskeletal: Normal range of motion.   Neurological: He is alert and oriented to person, place, and time.   Skin: Skin is warm and dry. He is not diaphoretic.   Psychiatric: He has a normal mood and affect. His behavior is normal. Judgment and thought " content normal.   Vitals reviewed.      Assessment:       1. Colon cancer screening    2. Hemorrhoids, unspecified hemorrhoid type        Plan:         Jenni was seen today for hemorrhoids.    Diagnoses and all orders for this visit:    Colon cancer screening    Hemorrhoids, unspecified hemorrhoid type         I have explained the planned procedures to the patient.The risks, benefits and alternatives of the procedure were also explained in detail. Patient verbalized understanding, all questions were answered. The patient agrees to proceed as planned    Add fiber supplement.  Increase water.  Avoid sitting for long periods in the bathroom.  Sitz bath as needed.    Colonoscopy for screening.      If hemorrhoidal complaints become more problematic for patient, can consider surgical referral.

## 2018-01-02 ENCOUNTER — TELEPHONE (OUTPATIENT)
Dept: GASTROENTEROLOGY | Facility: CLINIC | Age: 58
End: 2018-01-02

## 2018-01-02 NOTE — TELEPHONE ENCOUNTER
----- Message from Vonnie Monroe sent at 1/2/2018 12:28 PM CST -----  Contact: Wife. [phone]  Patient would like to speak with you about losing his medication for his colonoscopy and he needs another prescription sent to his pharmacy. Please advise

## 2018-01-08 ENCOUNTER — ANESTHESIA EVENT (OUTPATIENT)
Dept: ENDOSCOPY | Facility: HOSPITAL | Age: 58
End: 2018-01-08
Payer: COMMERCIAL

## 2018-01-08 ENCOUNTER — ANESTHESIA (OUTPATIENT)
Dept: ENDOSCOPY | Facility: HOSPITAL | Age: 58
End: 2018-01-08
Payer: COMMERCIAL

## 2018-01-08 ENCOUNTER — HOSPITAL ENCOUNTER (OUTPATIENT)
Facility: HOSPITAL | Age: 58
Discharge: HOME | End: 2018-01-08
Attending: INTERNAL MEDICINE | Admitting: INTERNAL MEDICINE
Payer: COMMERCIAL

## 2018-01-08 DIAGNOSIS — Z12.12 SCREENING FOR COLORECTAL CANCER: Primary | ICD-10-CM

## 2018-01-08 DIAGNOSIS — Z12.11 SCREENING FOR COLORECTAL CANCER: Primary | ICD-10-CM

## 2018-01-08 DIAGNOSIS — Z12.11 COLON CANCER SCREENING: ICD-10-CM

## 2018-01-08 PROCEDURE — 88305 TISSUE EXAM BY PATHOLOGIST: CPT | Mod: 26,,, | Performed by: PATHOLOGY

## 2018-01-08 PROCEDURE — 45380 COLONOSCOPY AND BIOPSY: CPT | Mod: 59,,, | Performed by: INTERNAL MEDICINE

## 2018-01-08 PROCEDURE — 63600175 PHARM REV CODE 636 W HCPCS: Performed by: NURSE ANESTHETIST, CERTIFIED REGISTERED

## 2018-01-08 PROCEDURE — 45381 COLONOSCOPY SUBMUCOUS NJX: CPT | Performed by: INTERNAL MEDICINE

## 2018-01-08 PROCEDURE — 45385 COLONOSCOPY W/LESION REMOVAL: CPT | Mod: ,,, | Performed by: INTERNAL MEDICINE

## 2018-01-08 PROCEDURE — 27201089 HC SNARE, DISP (ANY): Performed by: INTERNAL MEDICINE

## 2018-01-08 PROCEDURE — 27201028 HC NEEDLE, SCLERO: Performed by: INTERNAL MEDICINE

## 2018-01-08 PROCEDURE — 45380 COLONOSCOPY AND BIOPSY: CPT | Performed by: INTERNAL MEDICINE

## 2018-01-08 PROCEDURE — 88305 TISSUE EXAM BY PATHOLOGIST: CPT | Performed by: PATHOLOGY

## 2018-01-08 PROCEDURE — 25000003 PHARM REV CODE 250: Performed by: INTERNAL MEDICINE

## 2018-01-08 PROCEDURE — 37000008 HC ANESTHESIA 1ST 15 MINUTES: Performed by: INTERNAL MEDICINE

## 2018-01-08 PROCEDURE — 37000009 HC ANESTHESIA EA ADD 15 MINS: Performed by: INTERNAL MEDICINE

## 2018-01-08 PROCEDURE — 45381 COLONOSCOPY SUBMUCOUS NJX: CPT | Mod: 51,,, | Performed by: INTERNAL MEDICINE

## 2018-01-08 PROCEDURE — 27201042 HC RETRIEVAL NET: Performed by: INTERNAL MEDICINE

## 2018-01-08 PROCEDURE — 27201012 HC FORCEPS, HOT/COLD, DISP: Performed by: INTERNAL MEDICINE

## 2018-01-08 PROCEDURE — 45385 COLONOSCOPY W/LESION REMOVAL: CPT | Performed by: INTERNAL MEDICINE

## 2018-01-08 RX ORDER — PROPOFOL 10 MG/ML
VIAL (ML) INTRAVENOUS
Status: DISCONTINUED | OUTPATIENT
Start: 2018-01-08 | End: 2018-01-08

## 2018-01-08 RX ORDER — LIDOCAINE HCL/PF 100 MG/5ML
SYRINGE (ML) INTRAVENOUS
Status: DISCONTINUED | OUTPATIENT
Start: 2018-01-08 | End: 2018-01-08

## 2018-01-08 RX ORDER — PROPOFOL 10 MG/ML
VIAL (ML) INTRAVENOUS CONTINUOUS PRN
Status: DISCONTINUED | OUTPATIENT
Start: 2018-01-08 | End: 2018-01-08

## 2018-01-08 RX ORDER — SODIUM CHLORIDE 9 MG/ML
INJECTION, SOLUTION INTRAVENOUS CONTINUOUS
Status: DISCONTINUED | OUTPATIENT
Start: 2018-01-08 | End: 2018-01-08 | Stop reason: HOSPADM

## 2018-01-08 RX ADMIN — PROPOFOL 50 MG: 10 INJECTION, EMULSION INTRAVENOUS at 12:01

## 2018-01-08 RX ADMIN — PROPOFOL 150 MCG/KG/MIN: 10 INJECTION, EMULSION INTRAVENOUS at 12:01

## 2018-01-08 RX ADMIN — LIDOCAINE HYDROCHLORIDE 50 MG: 20 INJECTION, SOLUTION INTRAVENOUS at 12:01

## 2018-01-08 RX ADMIN — SODIUM CHLORIDE: 0.9 INJECTION, SOLUTION INTRAVENOUS at 11:01

## 2018-01-08 NOTE — H&P
"Reports episodic hemorrhoidal complaints with hemorrhoid "going in and out".  Denies pain but may have irritation if the hemorrhoid "stays out".  Episodic bright red rectal bleeding noted when wiping.  Reports regular bowel habit with soft and easy to pass stool without straining.  He does report that at times he may sit for 20 minutes to make sure he is empty before going to work;  If he is at home and not working, he reports he may have several bowel movements in the morning before feeling complete emptying.    Denies abdominal pain.  Denies black stools, nausea or vomiting.    Denies family history of colon cancer or colon polyps.  He has never had colonoscopy.     Review of Systems   Constitutional: Negative.  Negative for activity change, appetite change, fatigue, fever and unexpected weight change.   Respiratory: Negative for shortness of breath.    Cardiovascular: Negative for chest pain.   Gastrointestinal: Positive for anal bleeding and blood in stool. Negative for abdominal distention, abdominal pain, constipation, diarrhea, nausea, rectal pain and vomiting.   Genitourinary: Negative.    Musculoskeletal: Negative.    Skin: Negative.    Neurological: Negative.    Psychiatric/Behavioral: Negative.        Objective:   Physical Exam   Constitutional: He is oriented to person, place, and time. He appears well-developed and well-nourished. No distress.   HENT:   Head: Normocephalic.   Eyes: No scleral icterus.   Cardiovascular: Normal rate.    Pulmonary/Chest: Effort normal. No respiratory distress.   Abdominal: Soft. There is no tenderness.   Musculoskeletal: Normal range of motion.   Neurological: He is alert and oriented to person, place, and time.   Skin: Skin is warm and dry. He is not diaphoretic.   Psychiatric: He has a normal mood and affect. His behavior is normal. Judgment and thought content normal.   Vitals reviewed.      Assessment:       1. Colon cancer screening    2. Hemorrhoids, unspecified " hemorrhoid type        Plan:              Diagnoses and all orders for this visit:     Colon cancer screening     Hemorrhoids, unspecified hemorrhoid type           I have explained the planned procedures to the patient.The risks, benefits and alternatives of the procedure were also explained in detail. Patient verbalized understanding, all questions were answered. The patient agrees to proceed as planned     Add fiber supplement.  Increase water.  Avoid sitting for long periods in the bathroom.  Sitz bath as needed.     Colonoscopy for screening.       If hemorrhoidal complaints become more problematic for patient, can consider surgical referral.

## 2018-01-08 NOTE — ANESTHESIA POSTPROCEDURE EVALUATION
"Anesthesia Post Evaluation    Patient: Jenni Prescott JrPatricia    Procedure(s) Performed: Procedure(s) (LRB):  COLONOSCOPY (N/A)    Final Anesthesia Type: MAC  Patient location during evaluation: GI PACU  Patient participation: Yes- Able to Participate  Level of consciousness: awake and alert and oriented  Post-procedure vital signs: reviewed and stable  Pain management: adequate  Airway patency: patent  PONV status at discharge: No PONV  Anesthetic complications: no      Cardiovascular status: blood pressure returned to baseline and hemodynamically stable  Respiratory status: unassisted, spontaneous ventilation and room air  Hydration status: euvolemic  Follow-up not needed.        Visit Vitals  /87   Pulse 70   Temp 37 °C (98.6 °F)   Resp 18   Ht 5' 10" (1.778 m)   Wt 97.5 kg (215 lb)   SpO2 (!) 94%   BMI 30.85 kg/m²       Pain/Milly Score: Pain Assessment Performed: Yes (1/8/2018 11:48 AM)  Presence of Pain: denies (1/8/2018 11:48 AM)      "

## 2018-01-08 NOTE — PLAN OF CARE
Dr bojorquez at bedside speaking to pt and and wife in regards to procedure and discharge instructions with all questions addressed.

## 2018-01-08 NOTE — DISCHARGE INSTRUCTIONS
Discharge Summary/Instructions for after Colonoscopy with Biopsy/Polypectomy    Jenni Prescott Jr.  1/8/2018  Carson Yañez Jr., *    Restrictions on Activity:    - Do not drive car, or operate machinery, make critical decisions, or do activities that   require coordination or balance for 24 hours.  - The following day: return to full activity including work.  - For 3 days: No heavy lifting, straining or running.  - Diet: Eat and drink normally unless instructed otherwise.    Treatment for Common Side Effects:  - Mild abdominal pain and bloating or excessive gas: rest, eat lightly and use a heating pad.     Symptoms to watch for and report to your physician:  1. Severe abdominal pain.  2. Fever within 24 hours after a procedure.  3. A large amount of rectal bleeding. (A small amount of blood from the rectum is not serious, especially if hemorrhoids are present.)  4. Because air was put into your colon during the procedure, expelling large amount of air from your rectum is normal.  5. You may not have a bowel movement for 1-3 days because of the colonoscopy prep. This is normal.  6. Go directly to the emergency room if you notice any of the following:     Chills and/or fever over 101   Persistent vomiting   Severe abdominal pain, other than gas cramps   Severe chest pain   Black, tarry stools   Any bleeding - exceeding one tablespoon    If you have any questions or problems, please call your Physician:    Carson Yañez Jr., *      If a complication or emergency situation arises and you are unable to reach your Physician - GO TO THE EMERGENCY ROOM.

## 2018-01-08 NOTE — TRANSFER OF CARE
"Anesthesia Transfer of Care Note    Patient: Jenni Prescott JrPatricia    Procedure(s) Performed: Procedure(s) (LRB):  COLONOSCOPY (N/A)    Patient location: GI    Anesthesia Type: MAC    Transport from OR: Transported from OR on room air with adequate spontaneous ventilation    Post pain: adequate analgesia    Post assessment: no apparent anesthetic complications    Post vital signs: stable    Level of consciousness: awake, alert and oriented    Nausea/Vomiting: no nausea/vomiting    Complications: none    Transfer of care protocol was followed      Last vitals:   Visit Vitals  /87   Pulse 70   Temp 37 °C (98.6 °F)   Resp 18   Ht 5' 10" (1.778 m)   Wt 97.5 kg (215 lb)   SpO2 (!) 94%   BMI 30.85 kg/m²     "

## 2018-01-09 VITALS
SYSTOLIC BLOOD PRESSURE: 129 MMHG | WEIGHT: 215 LBS | RESPIRATION RATE: 18 BRPM | OXYGEN SATURATION: 100 % | HEIGHT: 70 IN | TEMPERATURE: 99 F | DIASTOLIC BLOOD PRESSURE: 76 MMHG | BODY MASS INDEX: 30.78 KG/M2 | HEART RATE: 64 BPM

## 2018-01-25 ENCOUNTER — TELEPHONE (OUTPATIENT)
Dept: GASTROENTEROLOGY | Facility: CLINIC | Age: 58
End: 2018-01-25

## 2018-01-25 DIAGNOSIS — Z86.010 HISTORY OF COLON POLYPS: Primary | ICD-10-CM

## 2018-01-25 NOTE — TELEPHONE ENCOUNTER
----- Message from Lorrie Carrasco MA sent at 1/18/2018  2:12 PM CST -----  Regarding: FW: polypectomies  Can you please schedule patient there?  Carola  ----- Message -----  From: Rex Blanco MD  Sent: 1/18/2018  11:57 AM  To: Lorrie Carrasco MA  Subject: RE: polypectomies                                either  ----- Message -----  From: Lorrie Carrasco MA  Sent: 1/17/2018   4:06 PM  To: Rex Blanco MD  Subject: RE: polypectomies                                Here or [place]  ----- Message -----  From: Rex Blanco MD  Sent: 1/17/2018   1:47 PM  To: Lorrie Carrasco MA, Carson Yañez Jr., MD  Subject: RE: polypectomies                                Of course. This does interest me.   Carola please schedule.    Thank you Carson Goodman  ----- Message -----  From: Carson Yañez Jr., MD  Sent: 1/16/2018   2:01 PM  To: Carson Yañez Jr., MD, #  Subject: polypectomies                                    FRED Goodman removed > a dozen advanced adenomas for this fellow Jan 8th  Actually ran out of time in the day, with a dozen or more remaining  No high grade dysplasia or cancer on any specimens    One remaining is a linear spreading adenoma (which I did not touch) of the cecum  Could I interest you in approaching the cecal lesion?    It appears resectable (see Provation image 14)    And perhaps clearing the colon of some of the others  Let me know    Thanks  JH

## 2018-01-25 NOTE — TELEPHONE ENCOUNTER
Patient scheduled for COLONOSCOPY with Dr. Ramon Blanco on 02/19/2018.  Miralax Split Prep instructions explained to patient . Lab will call two days before with arrival time at [facility]. Make sure to have someone to drive you home after procedure.  Verbalized understanding.

## 2018-02-19 ENCOUNTER — SURGERY (OUTPATIENT)
Age: 58
End: 2018-02-19

## 2018-02-19 ENCOUNTER — ANESTHESIA (OUTPATIENT)
Dept: ENDOSCOPY | Facility: HOSPITAL | Age: 58
End: 2018-02-19
Payer: COMMERCIAL

## 2018-02-19 ENCOUNTER — HOSPITAL ENCOUNTER (OUTPATIENT)
Facility: HOSPITAL | Age: 58
Discharge: HOME | End: 2018-02-19
Attending: INTERNAL MEDICINE | Admitting: INTERNAL MEDICINE
Payer: COMMERCIAL

## 2018-02-19 ENCOUNTER — ANESTHESIA EVENT (OUTPATIENT)
Dept: ENDOSCOPY | Facility: HOSPITAL | Age: 58
End: 2018-02-19
Payer: COMMERCIAL

## 2018-02-19 DIAGNOSIS — K63.5 COLON POLYP: ICD-10-CM

## 2018-02-19 DIAGNOSIS — K63.5 POLYP OF COLON, UNSPECIFIED PART OF COLON, UNSPECIFIED TYPE: Primary | ICD-10-CM

## 2018-02-19 PROCEDURE — 27201028 HC NEEDLE, SCLERO: Performed by: INTERNAL MEDICINE

## 2018-02-19 PROCEDURE — 27201089 HC SNARE, DISP (ANY): Performed by: INTERNAL MEDICINE

## 2018-02-19 PROCEDURE — 88305 TISSUE EXAM BY PATHOLOGIST: CPT | Performed by: PATHOLOGY

## 2018-02-19 PROCEDURE — 37000009 HC ANESTHESIA EA ADD 15 MINS: Performed by: INTERNAL MEDICINE

## 2018-02-19 PROCEDURE — 45390 COLONOSCOPY W/RESECTION: CPT | Performed by: INTERNAL MEDICINE

## 2018-02-19 PROCEDURE — 25000003 PHARM REV CODE 250: Performed by: INTERNAL MEDICINE

## 2018-02-19 PROCEDURE — 37000008 HC ANESTHESIA 1ST 15 MINUTES: Performed by: INTERNAL MEDICINE

## 2018-02-19 PROCEDURE — 45385 COLONOSCOPY W/LESION REMOVAL: CPT | Mod: 33,,, | Performed by: INTERNAL MEDICINE

## 2018-02-19 PROCEDURE — 88305 TISSUE EXAM BY PATHOLOGIST: CPT | Mod: 26,,, | Performed by: PATHOLOGY

## 2018-02-19 PROCEDURE — 63600175 PHARM REV CODE 636 W HCPCS: Performed by: NURSE ANESTHETIST, CERTIFIED REGISTERED

## 2018-02-19 PROCEDURE — 45381 COLONOSCOPY SUBMUCOUS NJX: CPT | Mod: 51,,, | Performed by: INTERNAL MEDICINE

## 2018-02-19 RX ORDER — SODIUM CHLORIDE 9 MG/ML
INJECTION, SOLUTION INTRAVENOUS CONTINUOUS
Status: DISCONTINUED | OUTPATIENT
Start: 2018-02-19 | End: 2018-02-19 | Stop reason: HOSPADM

## 2018-02-19 RX ORDER — PROPOFOL 10 MG/ML
VIAL (ML) INTRAVENOUS
Status: DISCONTINUED | OUTPATIENT
Start: 2018-02-19 | End: 2018-02-19

## 2018-02-19 RX ORDER — LIDOCAINE HCL/PF 100 MG/5ML
SYRINGE (ML) INTRAVENOUS
Status: DISCONTINUED | OUTPATIENT
Start: 2018-02-19 | End: 2018-02-19

## 2018-02-19 RX ORDER — PROPOFOL 10 MG/ML
VIAL (ML) INTRAVENOUS CONTINUOUS PRN
Status: DISCONTINUED | OUTPATIENT
Start: 2018-02-19 | End: 2018-02-19

## 2018-02-19 RX ADMIN — PROPOFOL 150 MCG/KG/MIN: 10 INJECTION, EMULSION INTRAVENOUS at 08:02

## 2018-02-19 RX ADMIN — PROPOFOL 50 MG: 10 INJECTION, EMULSION INTRAVENOUS at 08:02

## 2018-02-19 RX ADMIN — SODIUM CHLORIDE: 0.9 INJECTION, SOLUTION INTRAVENOUS at 07:02

## 2018-02-19 RX ADMIN — LIDOCAINE HYDROCHLORIDE 100 MG: 20 INJECTION, SOLUTION INTRAVENOUS at 08:02

## 2018-02-19 NOTE — TRANSFER OF CARE
"Anesthesia Transfer of Care Note    Patient: Jenni Prescott Jr.    Procedure(s) Performed: Procedure(s) (LRB):  COLONOSCOPY/Polypectomy (N/A)    Patient location: GI    Anesthesia Type: MAC    Transport from OR: Transported from OR on room air with adequate spontaneous ventilation    Post pain: adequate analgesia    Post assessment: no apparent anesthetic complications    Post vital signs: stable    Level of consciousness: awake    Nausea/Vomiting: no nausea/vomiting    Complications: none    Transfer of care protocol was followed      Last vitals:   Visit Vitals  BP (!) 153/96 (BP Location: Left arm, Patient Position: Lying)   Pulse 62   Temp 36.4 °C (97.6 °F) (Oral)   Resp 18   Ht 5' 10" (1.778 m)   Wt 97.5 kg (215 lb)   SpO2 96%   BMI 30.85 kg/m²     "

## 2018-02-19 NOTE — DISCHARGE INSTRUCTIONS
Discharge Summary/Instructions for after Colonoscopy with Biopsy/Polypectomy    Jenni Prescott JrPatricia  2/19/2018  Rex Blanco MD    Restrictions on Activity:    - Do not drive car, or operate machinery, make critical decisions, or do activities that   require coordination or balance for 24 hours.  - The following day: return to full activity including work.  - For 3 days: No heavy lifting, straining or running.  - Diet: Eat and drink normally unless instructed otherwise.    Treatment for Common Side Effects:  - Mild abdominal pain and bloating or excessive gas: rest, eat lightly and use a heating pad.     Symptoms to watch for and report to your physician:  1. Severe abdominal pain.  2. Fever within 24 hours after a procedure.  3. A large amount of rectal bleeding. (A small amount of blood from the rectum is not serious, especially if hemorrhoids are present.)  4. Because air was put into your colon during the procedure, expelling large amount of air from your rectum is normal.  5. You may not have a bowel movement for 1-3 days because of the colonoscopy prep. This is normal.  6. Go directly to the emergency room if you notice any of the following:     Chills and/or fever over 101   Persistent vomiting   Severe abdominal pain, other than gas cramps   Severe chest pain   Black, tarry stools   Any bleeding - exceeding one tablespoon    If you have any questions or problems, please call your Physician:    Rex Blanco MD      If a complication or emergency situation arises and you are unable to reach your Physician - GO TO THE EMERGENCY ROOM.

## 2018-02-19 NOTE — H&P
History & Physical - Short Stay  Gastroenterology      SUBJECTIVE:     Procedure: Colonoscopy    Chief Complaint/Indication for Procedure: colon polyp    History of Present Illness:  Patient is a 57 y.o. male with large colon polyp coming for EMR    PTA Medications   Medication Sig    atorvastatin (LIPITOR) 20 MG tablet Take 1 tablet (20 mg total) by mouth once daily.    lisinopril-hydrochlorothiazide (PRINZIDE,ZESTORETIC) 20-12.5 mg per tablet Take one tablet every day for elevated blood pressure       Review of patient's allergies indicates:   Allergen Reactions    Codeine         Past Medical History:   Diagnosis Date    Hypertension      Past Surgical History:   Procedure Laterality Date    COLONOSCOPY N/A 1/8/2018    Procedure: COLONOSCOPY;  Surgeon: Carson Yañez Jr., MD;  Location: [facility];  Service: Endoscopy;  Laterality: N/A;    KNEE SURGERY Bilateral     states he has screws    KNEE SURGERY      3 reconstructive surgeries 20yrs go     WRIST SURGERY      2015     Family History   Problem Relation Age of Onset    No Known Problems Mother     Cancer Father     Lung cancer Father     Hyperlipidemia Brother     Hypertension Brother      Social History   Substance Use Topics    Smoking status: Never Smoker    Smokeless tobacco: Never Used    Alcohol use No      Comment: drinks once a week       Review of Systems:  Constitutional: no fever or chills  Gastrointestinal: no nausea or vomiting, no abdominal pain or change in bowel habits    OBJECTIVE:     Vital Signs (Most Recent)  Temp: 97.6 °F (36.4 °C) (02/19/18 0721)  Pulse: 62 (02/19/18 0721)  Resp: 18 (02/19/18 0721)  BP: (!) 153/96 (02/19/18 0721)  SpO2: 96 % (02/19/18 0721)    Physical Exam:  General: well developed, well nourished  Abdomen: soft, non-tender non-distented; bowel sounds normal; no masses,  no organomegaly         ASSESSMENT/PLAN:     Patient is a 57 y.o. male with large colon polyp coming for EMR    Plan:  Colonoscopy    Anesthesia Plan: Moderate Sedation    ASA Grade: ASA 2 - Patient with mild systemic disease with no functional limitations

## 2018-02-19 NOTE — ANESTHESIA POSTPROCEDURE EVALUATION
"Anesthesia Post Evaluation    Patient: Jenni Prescott JrPatricia    Procedure(s) Performed: Procedure(s) (LRB):  COLONOSCOPY/Polypectomy (N/A)    Final Anesthesia Type: MAC  Patient location during evaluation: GI PACU  Patient participation: Yes- Able to Participate  Level of consciousness: awake and alert  Post-procedure vital signs: reviewed and stable  Pain management: adequate  Airway patency: patent  PONV status at discharge: No PONV  Anesthetic complications: no      Cardiovascular status: blood pressure returned to baseline and hemodynamically stable  Respiratory status: unassisted, spontaneous ventilation and room air  Hydration status: euvolemic  Follow-up not needed.        Visit Vitals  BP (!) 153/96 (BP Location: Left arm, Patient Position: Lying)   Pulse 62   Temp 36.4 °C (97.6 °F) (Oral)   Resp 18   Ht 5' 10" (1.778 m)   Wt 97.5 kg (215 lb)   SpO2 96%   BMI 30.85 kg/m²       Pain/Milly Score: Pain Assessment Performed: Yes (2/19/2018  7:22 AM)  Presence of Pain: denies (2/19/2018  7:22 AM)      "

## 2018-02-19 NOTE — ANESTHESIA PREPROCEDURE EVALUATION
02/19/2018  Jenni Prescott Jr. is a 57 y.o., male for colonoscopy under MAC. Had same 1/8/18 [facility].    Pre-op Assessment      I have reviewed the Medications.     Review of Systems  Anesthesia Hx:  Denies Family Hx of Anesthesia complications.    Social:  Non-Smoker, No Alcohol Use  Illicit Drug Use: Types of drugs include Marijuana,   Cardiovascular:   Exercise tolerance: good Hypertension    Hepatic/GI:   Bowel Prep.        Physical Exam  General:  Well nourished    Airway/Jaw/Neck:  Airway Findings: Mouth Opening: Normal Tongue: Normal  General Airway Assessment: Adult  Mallampati: II      Dental:  Dental Findings: In tact   Chest/Lungs:  Chest/Lungs Findings: Clear to auscultation, Normal Respiratory Rate     Heart/Vascular:  Heart Findings: Rate: Normal  Rhythm: Regular Rhythm        Mental Status:  Mental Status Findings:  Cooperative, Alert and Oriented         Anesthesia Plan  Type of Anesthesia, risks & benefits discussed:  Anesthesia Type:  MAC  Patient's Preference: MAC  Intra-op Monitoring Plan:   Intra-op Monitoring Plan Comments:   Post Op Pain Control Plan:   Post Op Pain Control Plan Comments:   Induction:   IV  Beta Blocker:  Patient is not currently on a Beta-Blocker (No further documentation required).       Informed Consent: Patient understands risks and agrees with Anesthesia plan.  Questions answered. Anesthesia consent signed with patient.  ASA Score: 2     Day of Surgery Review of History & Physical:            Ready For Surgery From Anesthesia Perspective.

## 2018-02-20 VITALS
TEMPERATURE: 98 F | DIASTOLIC BLOOD PRESSURE: 79 MMHG | RESPIRATION RATE: 18 BRPM | SYSTOLIC BLOOD PRESSURE: 144 MMHG | HEART RATE: 54 BPM | OXYGEN SATURATION: 96 % | BODY MASS INDEX: 30.78 KG/M2 | WEIGHT: 215 LBS | HEIGHT: 70 IN

## 2018-02-21 ENCOUNTER — TELEPHONE (OUTPATIENT)
Dept: GASTROENTEROLOGY | Facility: CLINIC | Age: 58
End: 2018-02-21

## 2018-02-21 DIAGNOSIS — Z86.010 HISTORY OF COLON POLYPS: ICD-10-CM

## 2018-02-21 DIAGNOSIS — Z91.89 HIGH RISK FOR COLON CANCER: Primary | ICD-10-CM

## 2018-02-21 NOTE — TELEPHONE ENCOUNTER
----- Message from Rex Blanco MD sent at 2/21/2018 12:01 PM CST -----  Polyps were removed. They were benign but precancerous as expected.   Repeat colonoscopy and EGD in 3 months.  Refer to .

## 2018-02-22 NOTE — TELEPHONE ENCOUNTER
Results given to patient. Patient verbalized understanding.  appointment scheduled for July 30.  Patient has been placed on waitlist.      EGD/COLON scheduled for 05/14/2018 with Dr. Ramon Blanco. Instructions discussed and mailed to patient.

## 2018-04-13 ENCOUNTER — TELEPHONE (OUTPATIENT)
Dept: GENETICS | Facility: CLINIC | Age: 58
End: 2018-04-13

## 2018-04-13 NOTE — TELEPHONE ENCOUNTER
Spoke to Mr. Prescott who informed me that he has a personal history of colon polyps and family history of colon cancer (grandparents). I explained that we will cancel the appointment with medical genetics as we no longer offer counseling services for hereditary cancer. We will submit a referral to [facility] on behalf of Dr Ramon Blanco. This service is free to [facility] patients. Mr. Prescott agreed with the plan and denied questions.

## 2018-05-03 ENCOUNTER — TELEPHONE (OUTPATIENT)
Dept: GASTROENTEROLOGY | Facility: CLINIC | Age: 58
End: 2018-05-03

## 2018-05-03 NOTE — TELEPHONE ENCOUNTER
----- Message from Yesenia Mena sent at 5/3/2018  8:42 AM CDT -----  Contact: [phone]/Liset  pt's wife   Pt has a procedure schedule on 05/14/18 . Pt wants to know if there is available appointment for the following week . Please advise

## 2018-05-20 ENCOUNTER — ANESTHESIA EVENT (OUTPATIENT)
Dept: ENDOSCOPY | Facility: HOSPITAL | Age: 58
End: 2018-05-20
Payer: COMMERCIAL

## 2018-05-21 ENCOUNTER — SURGERY (OUTPATIENT)
Age: 58
End: 2018-05-21

## 2018-05-21 ENCOUNTER — ANESTHESIA (OUTPATIENT)
Dept: ENDOSCOPY | Facility: HOSPITAL | Age: 58
End: 2018-05-21
Payer: COMMERCIAL

## 2018-05-21 ENCOUNTER — HOSPITAL ENCOUNTER (OUTPATIENT)
Facility: HOSPITAL | Age: 58
Discharge: HOME | End: 2018-05-21
Attending: INTERNAL MEDICINE | Admitting: INTERNAL MEDICINE
Payer: COMMERCIAL

## 2018-05-21 VITALS
RESPIRATION RATE: 15 BRPM | SYSTOLIC BLOOD PRESSURE: 110 MMHG | HEIGHT: 70 IN | HEART RATE: 63 BPM | DIASTOLIC BLOOD PRESSURE: 59 MMHG | OXYGEN SATURATION: 97 % | WEIGHT: 205 LBS | TEMPERATURE: 97 F | BODY MASS INDEX: 29.35 KG/M2

## 2018-05-21 DIAGNOSIS — K63.5 POLYP OF COLON, UNSPECIFIED PART OF COLON, UNSPECIFIED TYPE: Primary | ICD-10-CM

## 2018-05-21 DIAGNOSIS — K63.5 COLON POLYP: ICD-10-CM

## 2018-05-21 DIAGNOSIS — Z12.11 SCREENING FOR COLORECTAL CANCER: ICD-10-CM

## 2018-05-21 DIAGNOSIS — Z12.12 SCREENING FOR COLORECTAL CANCER: ICD-10-CM

## 2018-05-21 PROCEDURE — 25000003 PHARM REV CODE 250: Performed by: NURSE ANESTHETIST, CERTIFIED REGISTERED

## 2018-05-21 PROCEDURE — 88305 TISSUE EXAM BY PATHOLOGIST: CPT | Performed by: PATHOLOGY

## 2018-05-21 PROCEDURE — 45380 COLONOSCOPY AND BIOPSY: CPT | Performed by: INTERNAL MEDICINE

## 2018-05-21 PROCEDURE — 45385 COLONOSCOPY W/LESION REMOVAL: CPT | Mod: 33,,, | Performed by: INTERNAL MEDICINE

## 2018-05-21 PROCEDURE — 87077 CULTURE AEROBIC IDENTIFY: CPT | Performed by: INTERNAL MEDICINE

## 2018-05-21 PROCEDURE — 43239 EGD BIOPSY SINGLE/MULTIPLE: CPT | Mod: 51,,, | Performed by: INTERNAL MEDICINE

## 2018-05-21 PROCEDURE — 43239 EGD BIOPSY SINGLE/MULTIPLE: CPT | Performed by: INTERNAL MEDICINE

## 2018-05-21 PROCEDURE — 88305 TISSUE EXAM BY PATHOLOGIST: CPT | Mod: 26,,, | Performed by: PATHOLOGY

## 2018-05-21 PROCEDURE — 27201012 HC FORCEPS, HOT/COLD, DISP: Performed by: INTERNAL MEDICINE

## 2018-05-21 PROCEDURE — 27201089 HC SNARE, DISP (ANY): Performed by: INTERNAL MEDICINE

## 2018-05-21 PROCEDURE — 63600175 PHARM REV CODE 636 W HCPCS: Performed by: NURSE ANESTHETIST, CERTIFIED REGISTERED

## 2018-05-21 PROCEDURE — 37000009 HC ANESTHESIA EA ADD 15 MINS: Performed by: INTERNAL MEDICINE

## 2018-05-21 PROCEDURE — 37000008 HC ANESTHESIA 1ST 15 MINUTES: Performed by: INTERNAL MEDICINE

## 2018-05-21 PROCEDURE — 45380 COLONOSCOPY AND BIOPSY: CPT | Mod: 59,,, | Performed by: INTERNAL MEDICINE

## 2018-05-21 PROCEDURE — 25000003 PHARM REV CODE 250: Performed by: INTERNAL MEDICINE

## 2018-05-21 PROCEDURE — 45385 COLONOSCOPY W/LESION REMOVAL: CPT | Performed by: INTERNAL MEDICINE

## 2018-05-21 RX ORDER — PROPOFOL 10 MG/ML
VIAL (ML) INTRAVENOUS
Status: DISCONTINUED | OUTPATIENT
Start: 2018-05-21 | End: 2018-05-21

## 2018-05-21 RX ORDER — SODIUM CHLORIDE 9 MG/ML
INJECTION, SOLUTION INTRAVENOUS CONTINUOUS
Status: DISCONTINUED | OUTPATIENT
Start: 2018-05-21 | End: 2018-05-21 | Stop reason: HOSPADM

## 2018-05-21 RX ORDER — PROPOFOL 10 MG/ML
VIAL (ML) INTRAVENOUS CONTINUOUS PRN
Status: DISCONTINUED | OUTPATIENT
Start: 2018-05-21 | End: 2018-05-21

## 2018-05-21 RX ORDER — LIDOCAINE HCL/PF 100 MG/5ML
SYRINGE (ML) INTRAVENOUS
Status: DISCONTINUED | OUTPATIENT
Start: 2018-05-21 | End: 2018-05-21

## 2018-05-21 RX ORDER — GLYCOPYRROLATE 0.2 MG/ML
INJECTION INTRAMUSCULAR; INTRAVENOUS
Status: DISCONTINUED | OUTPATIENT
Start: 2018-05-21 | End: 2018-05-21

## 2018-05-21 RX ADMIN — GLYCOPYRROLATE 0.2 MG: 0.2 INJECTION, SOLUTION INTRAMUSCULAR; INTRAVENOUS at 09:05

## 2018-05-21 RX ADMIN — LIDOCAINE HYDROCHLORIDE 100 MG: 20 INJECTION, SOLUTION INTRAVENOUS at 10:05

## 2018-05-21 RX ADMIN — PROPOFOL 200 MCG/KG/MIN: 10 INJECTION, EMULSION INTRAVENOUS at 10:05

## 2018-05-21 RX ADMIN — PROPOFOL 60 MG: 10 INJECTION, EMULSION INTRAVENOUS at 10:05

## 2018-05-21 RX ADMIN — PROPOFOL 40 MG: 10 INJECTION, EMULSION INTRAVENOUS at 10:05

## 2018-05-21 RX ADMIN — TOPICAL ANESTHETIC 1 EACH: 200 SPRAY DENTAL; PERIODONTAL at 09:05

## 2018-05-21 RX ADMIN — SODIUM CHLORIDE: 0.9 INJECTION, SOLUTION INTRAVENOUS at 09:05

## 2018-05-21 NOTE — [employer] PATIENT INSTRUCTIONS
Discharge Summary/Instructions after an Endoscopic Procedure  Patient Name: Jenni Prescott  Patient MRN: [id number]  Patient YOB: 1960  Monday, May 21, 2018  Malvin Lozano MD  RESTRICTIONS:  During your procedure today, you received medications for sedation.  These   medications may affect your judgment, balance and coordination.  Therefore,   for 24 hours, you have the following restrictions:   - DO NOT drive a car, operate machinery, make legal/financial decisions,   sign important papers or drink alcohol.    ACTIVITY:  The following day: return to full activity including work, except no heavy   lifting, straining or running for 3 days if polyps were removed.  DIET:  Eat and drink normally unless instructed otherwise.     TREATMENT FOR COMMON SIDE EFFECTS:  - Mild abdominal pain, nausea, belching, bloating or excessive gas:  rest,   eat lightly and use a heating pad.  - Sore Throat: treat with throat lozenges and/or gargle with warm salt   water.  - Because air was used during the procedure, expelling large amounts of air   from your rectum or belching is normal.  - If a bowel prep was taken, you may not have a bowel movement for 1-3 days.    This is normal.  SYMPTOMS TO WATCH FOR AND REPORT TO YOUR PHYSICIAN:  1. Abdominal pain or bloating, other than gas cramps.  2. Chest pain.  3. Back pain.  4. Signs of infection such as: chills or fever occurring within 24 hours   after the procedure.  5. Rectal bleeding, which would show as bright red, maroon, or black stools.   (A tablespoon of blood from the rectum is not serious, especially if   hemorrhoids are present.)  6. Vomiting.  7. Weakness or dizziness.  GO DIRECTLY TO THE NEAREST EMERGENCY ROOM IF YOU HAVE ANY OF THE FOLLOWING:      Difficulty breathing              Chills and/or fever over 101 F   Persistent vomiting and/or vomiting blood   Severe abdominal pain   Severe chest pain   Black, tarry stools   Bleeding- more than one tablespoon   Any other  symptom or condition that you feel may need urgent attention  Your doctor recommends these additional instructions:  If any biopsies were taken, your doctors clinic will contact you in 1 to 2   weeks with any results.  - Discharge patient to home (ambulatory).   - Await pathology results.   - Perform a colonoscopy today.  For questions, problems or results please call your physician - Malvin Lozano MD at Work:  [phone].  EMERGENCY PHONE NUMBER: [phone],  LAB RESULTS: [phone]  IF A COMPLICATION OR EMERGENCY SITUATION ARISES AND YOU ARE UNABLE TO REACH   YOUR PHYSICIAN - GO DIRECTLY TO THE EMERGENCY ROOM.  Malvin Lozano MD  5/21/2018 10:18:43 AM  This report has been verified and signed electronically.  [employer]

## 2018-05-21 NOTE — H&P
History & Physical - Short Stay  Gastroenterology      SUBJECTIVE:     Procedure: Colonoscopy and EGD    Chief Complaint/Indication for Procedure: Previous Polyps    History of Present Illness:  Patient is a 57 y.o. male presents with colon polyps S/P colonoscopy with polypectomy and piecemeal EMR here for follow up. The patient have significant polyposis. He is schedule for EGD with side viewing scope and colonoscopy to follow up the previous piecemeal resection. No family history of significant colon polyps. Stated grandparents with colon cancer history.    PTA Medications   Medication Sig    atorvastatin (LIPITOR) 20 MG tablet Take 1 tablet (20 mg total) by mouth once daily.    lisinopril-hydrochlorothiazide (PRINZIDE,ZESTORETIC) 20-12.5 mg per tablet Take one tablet every day for elevated blood pressure       Review of patient's allergies indicates:   Allergen Reactions    Codeine         Past Medical History:   Diagnosis Date    Hypertension      Past Surgical History:   Procedure Laterality Date    COLONOSCOPY N/A 1/8/2018    Procedure: COLONOSCOPY;  Surgeon: Carson Yañez Jr., MD;  Location: [facility] ENDO;  Service: Endoscopy;  Laterality: N/A;    COLONOSCOPY N/A 2/19/2018    Procedure: COLONOSCOPY/Polypectomy;  Surgeon: Rex Blanco MD;  Location: [facility] ENDO;  Service: Endoscopy;  Laterality: N/A;    KNEE SURGERY Bilateral     states he has screws    KNEE SURGERY      3 reconstructive surgeries 20yrs go     WRIST SURGERY      2015     Family History   Problem Relation Age of Onset    No Known Problems Mother     Cancer Father     Lung cancer Father     Hyperlipidemia Brother     Hypertension Brother      Social History   Substance Use Topics    Smoking status: Never Smoker    Smokeless tobacco: Never Used    Alcohol use No      Comment:  quit        Review of Systems:        OBJECTIVE:     Vital Signs (Most Recent)       Physical Exam:  General: well developed, well nourished  Lungs:   clear to auscultation bilaterally and normal respiratory effort  Heart: regular rate, S1, S2 normal  Abdomen: soft, non-tender non-distented; bowel sounds normal; no masses,  no organomegaly    Laboratory  CBC: No results for input(s): WBC, RBC, HGB, HCT, PLT, MCV, MCH, MCHC in the last 168 hours.  CMP: No results for input(s): GLU, CALCIUM, ALBUMIN, PROT, NA, K, CO2, CL, BUN, CREATININE, ALKPHOS, ALT, AST, BILITOT in the last 168 hours.  Coagulation: No results for input(s): LABPROT, INR, APTT in the last 168 hours.    Diagnostic Results:         ASSESSMENT/PLAN:     Colon polyps S/P EMR    Plan: Colonoscopy and EGD    Anesthesia Plan: MAC    ASA Grade: ASA 2 - Patient with mild systemic disease with no functional limitations     The impression and plan was discussed in detail with the patient and family. All questions have been answered and the patient voices understanding of our plan at this point. The risk of the procedure was discussed in detail which includes but not limited to bleeding, infection, perforation in some cases requiring surgery with its spectrum of complications.

## 2018-05-21 NOTE — ANESTHESIA PREPROCEDURE EVALUATION
05/20/2018  Jenni Prescott Jr. is a 57 y.o., male w hx GI polyps for EGD & Colonoscopy.    PRIOR ANES (in Epic)  20180520 [id number] Colonoscopy     prop 50+50 -> 150 mcg/kg.min VSS NAA  [id number] Colonoscopy    prop 50 -> 100 mcg/kg/min VSS NAA  [id number] ORIF_Radius GA     [mid 2, fent 50+100]     [sevo, pheyleph 400] NAAC     [mod diff mask vent; ETT 8.0, DL fail X 5 ->        Glidescope Mac4 Grade I]    ANES-RELATED MED/SURG    Patient Active Problem List   Diagnosis    Fall    Lumbar transverse process fracture    Fall    Multiple rib fractures    Pneumothorax, closed, traumatic    Multiple closed pelvic fractures with disruption of pelvic ring    Distal radius fracture, right    Range of motion deficit    Right wrist pain    BMI 32.0-32.9,adult    Essential hypertension    Elevated fasting glucose    Screening for colorectal cancer    Colon polyp     Past Medical History:   Diagnosis Date    Hypertension      Past Surgical History:   Procedure Laterality Date    COLONOSCOPY N/A 1/8/2018    Procedure: COLONOSCOPY;  Surgeon: Carson Yañez Jr., MD;  Location: [facility] ENDO;  Service: Endoscopy;  Laterality: N/A;    COLONOSCOPY N/A 2/19/2018    Procedure: COLONOSCOPY/Polypectomy;  Surgeon: Rex Blanco MD;  Location: [facility];  Service: Endoscopy;  Laterality: N/A;    KNEE SURGERY Bilateral     states he has screws    KNEE SURGERY      3 reconstructive surgeries 20yrs go     WRIST SURGERY      2015     ALLERGIES  Review of patient's allergies indicates:   Allergen Reactions    Codeine      ANES-RELATED HOME Rx [id number]  Lisinopril-hctz    Anesthesia Evaluation         Review of Systems    Wt Readings from Last 1 Encounters:   02/19/18 97.5 kg (215 lb)     Temp Readings from Last 1 Encounters:   02/19/18 36.4 °C (97.5 °F) (Oral)     BP Readings from Last 1 Encounters:    02/19/18 (!) 144/79     Pulse Readings from Last 1 Encounters:   02/19/18 (!) 54     SpO2 Readings from Last 1 Encounters:   02/19/18 96%         Lab Results   Component Value Date    WBC 7.73 09/26/2017    HGB 15.5 09/26/2017    HCT 46.8 09/26/2017    MCV 90 09/26/2017     09/26/2017       Chemistry        Component Value Date/Time     09/26/2017 0818    K 4.3 09/26/2017 0818     09/26/2017 0818    CO2 27 09/26/2017 0818    BUN 21 (H) 09/26/2017 0818    CREATININE 0.9 09/26/2017 0818     09/26/2017 0818        Component Value Date/Time    CALCIUM 9.1 09/26/2017 0818    ALKPHOS 72 09/26/2017 0818    AST 22 09/26/2017 0818    ALT 31 09/26/2017 0818    BILITOT 1.1 (H) 09/26/2017 0818    ESTGFRAFRICA >60.0 09/26/2017 0818    EGFRNONAA >60.0 09/26/2017 0818          Lab Results   Component Value Date    ALBUMIN 4.0 09/26/2017      Lab Results   Component Value Date    TSH 2.702 01/13/2016       CXR [id number]  Subacute R rib fractures identified in April 2015.    Volume R lung < L lung, ¿pleural fluid?  No new pulmonary disease, pleural disease, lymph node enlargement or cardiac decompensation and no free air beneath the diaphragm or new osseous abnormality.    EKG 20190113  Normal sinus rhythm  Minimal voltage criteria for LVH, may be normal variant  Borderline Abnormal ECG  When compared with ECG of 21-APR-2015 19:49,  No significant change was found  Confirmed by George     ECHO      Anesthesia Plan  Type of Anesthesia, risks & benefits discussed:  Anesthesia Type:  MAC  Patient's Preference:   Intra-op Monitoring Plan:   Intra-op Monitoring Plan Comments:   Post Op Pain Control Plan:   Post Op Pain Control Plan Comments:   Induction:    Beta Blocker:  Patient is not currently on a Beta-Blocker (No further documentation required).       Informed Consent: Patient understands risks and agrees with Anesthesia plan.  Questions answered. Anesthesia consent signed with patient.  ASA Score: 2      Day of Surgery Review of History & Physical:        Anesthesia Plan Notes: [id number]   - Glidescope Mac4 required for last intubation        Ready For Surgery From Anesthesia Perspective.

## 2018-05-21 NOTE — ANESTHESIA POSTPROCEDURE EVALUATION
"Anesthesia Post Evaluation    Patient: Jenni Prescott     Procedure(s) Performed: Procedure(s) (LRB):  ESOPHAGOGASTRODUODENOSCOPY (EGD) w/ duodenoscope (N/A)  COLONOSCOPY/Miralax Split (N/A)    Final Anesthesia Type: MAC  Patient location during evaluation: GI PACU  Patient participation: Yes- Able to Participate  Level of consciousness: awake and alert  Post-procedure vital signs: reviewed and stable  Pain management: adequate  Airway patency: patent  PONV status at discharge: No PONV  Anesthetic complications: no      Cardiovascular status: hemodynamically stable  Respiratory status: unassisted, spontaneous ventilation and room air  Hydration status: euvolemic  Follow-up not needed.        Visit Vitals  /85 (Patient Position: Lying)   Pulse (!) 53   Temp 36.8 °C (98.3 °F) (Oral)   Resp 17   Ht 5' 10" (1.778 m)   Wt 93 kg (205 lb)   SpO2 97%   BMI 29.41 kg/m²       Pain/Milly Score: Pain Assessment Performed: Yes (5/21/2018  8:43 AM)  Presence of Pain: denies (5/21/2018  8:43 AM)      "

## 2018-05-21 NOTE — PROVATION PATIENT INSTRUCTIONS
Discharge Summary/Instructions after an Endoscopic Procedure  Patient Name: Jenni Prescott  Patient MRN: [id number]  Patient YOB: 1960  Monday, May 21, 2018  Malvin Lozano MD  RESTRICTIONS:  During your procedure today, you received medications for sedation.  These   medications may affect your judgment, balance and coordination.  Therefore,   for 24 hours, you have the following restrictions:   - DO NOT drive a car, operate machinery, make legal/financial decisions,   sign important papers or drink alcohol.    ACTIVITY:  The following day: return to full activity including work, except no heavy   lifting, straining or running for 3 days if polyps were removed.  DIET:  Eat and drink normally unless instructed otherwise.     TREATMENT FOR COMMON SIDE EFFECTS:  - Mild abdominal pain, nausea, belching, bloating or excessive gas:  rest,   eat lightly and use a heating pad.  - Sore Throat: treat with throat lozenges and/or gargle with warm salt   water.  - Because air was used during the procedure, expelling large amounts of air   from your rectum or belching is normal.  - If a bowel prep was taken, you may not have a bowel movement for 1-3 days.    This is normal.  SYMPTOMS TO WATCH FOR AND REPORT TO YOUR PHYSICIAN:  1. Abdominal pain or bloating, other than gas cramps.  2. Chest pain.  3. Back pain.  4. Signs of infection such as: chills or fever occurring within 24 hours   after the procedure.  5. Rectal bleeding, which would show as bright red, maroon, or black stools.   (A tablespoon of blood from the rectum is not serious, especially if   hemorrhoids are present.)  6. Vomiting.  7. Weakness or dizziness.  GO DIRECTLY TO THE NEAREST EMERGENCY ROOM IF YOU HAVE ANY OF THE FOLLOWING:      Difficulty breathing              Chills and/or fever over 101 F   Persistent vomiting and/or vomiting blood   Severe abdominal pain   Severe chest pain   Black, tarry stools   Bleeding- more than one tablespoon   Any other  symptom or condition that you feel may need urgent attention  Your doctor recommends these additional instructions:  If any biopsies were taken, your doctors clinic will contact you in 1 to 2   weeks with any results.  - Discharge patient to home (ambulatory).   - Await pathology results.   - Refer to a colo-rectal surgeon.   - Repeat colonoscopy in 6 months for surveillance.   - Resume previous diet.  For questions, problems or results please call your physician - Malvin Lozano MD at Work:  [phone].  EMERGENCY PHONE NUMBER: [phone],  LAB RESULTS: [phone]  IF A COMPLICATION OR EMERGENCY SITUATION ARISES AND YOU ARE UNABLE TO REACH   YOUR PHYSICIAN - GO DIRECTLY TO THE EMERGENCY ROOM.  Malvin Lozano MD  5/21/2018 11:42:24 AM  This report has been verified and signed electronically.  [employer]

## 2018-05-21 NOTE — DISCHARGE SUMMARY
Discharge Summary/Instructions after an Endoscopic Procedure    Patient Name: Jenni Prescott  Patient MRN: [id number]  Patient YOB: 1960    Monday, May 21, 2018  Malvin Lozano MD    RESTRICTIONS:  During your procedure today, you received medications for sedation.  These medications may affect your judgment, balance and coordination.  Therefore, for 24 hours, you have the following restrictions:     - DO NOT drive a car, operate machinery, make legal/financial decisions, sign important papers or drink alcohol.      ACTIVITY:  The following day: return to full activity including work, except no heavy lifting, straining or running for 3 days if polyps were removed.    DIET:  Eat and drink normally unless instructed otherwise.     TREATMENT FOR COMMON SIDE EFFECTS:  - Mild abdominal pain, nausea, belching, bloating or excessive gas:  rest, eat lightly and use a heating pad.  - Sore Throat: treat with throat lozenges and/or gargle with warm salt water.  - Because air was used during the procedure, expelling large amounts of air from your rectum or belching is normal.  - If a bowel prep was taken, you may not have a bowel movement for 1-3 days.  This is normal.      SYMPTOMS TO WATCH FOR AND REPORT TO YOUR PHYSICIAN:  1. Abdominal pain or bloating, other than gas cramps.  2. Chest pain.  3. Back pain.  4. Signs of infection such as: chills or fever occurring within 24 hours after the procedure.  5. Rectal bleeding, which would show as bright red, maroon, or black stools. (A tablespoon of blood from the rectum is not serious, especially if hemorrhoids are present.)  6. Vomiting.  7. Weakness or dizziness.      GO DIRECTLY TO THE NEAREST EMERGENCY ROOM IF YOU HAVE ANY OF THE FOLLOWING:     Difficulty breathing              Chills and/or fever over 101 F   Persistent vomiting and/or vomiting blood   Severe abdominal pain   Severe chest pain   Black, tarry stools   Bleeding- more than one tablespoon   Any other  symptom or condition that you feel may need urgent attention    Your doctor recommends these additional instructions:  If any biopsies were taken, your doctors clinic will contact you in 1 to 2 weeks with any results.    - Discharge patient to home (ambulatory).   - Await pathology results.   - Refer to a colo-rectal surgeon.   - Repeat colonoscopy in 6 months for surveillance.   - Resume previous diet.    For questions, problems or results please call your physician - Malvin Lozano MD at Work:  [phone].    EMERGENCY PHONE NUMBER: [phone],  LAB RESULTS: [phone]    IF A COMPLICATION OR EMERGENCY SITUATION ARISES AND YOU ARE UNABLE TO REACH YOUR PHYSICIAN - GO DIRECTLY TO THE EMERGENCY ROOM.

## 2018-05-30 DIAGNOSIS — A04.8 H. PYLORI INFECTION: Primary | ICD-10-CM

## 2018-05-30 RX ORDER — PANTOPRAZOLE SODIUM 40 MG/1
40 TABLET, DELAYED RELEASE ORAL 2 TIMES DAILY
Qty: 60 TABLET | Refills: 2 | Status: ON HOLD | OUTPATIENT
Start: 2018-05-30 | End: 2018-09-05

## 2018-05-30 RX ORDER — AMOXICILLIN 500 MG/1
1000 TABLET, FILM COATED ORAL EVERY 12 HOURS
Qty: 56 TABLET | Refills: 0 | Status: SHIPPED | OUTPATIENT
Start: 2018-05-30 | End: 2018-06-05 | Stop reason: SDUPTHER

## 2018-05-30 RX ORDER — CLARITHROMYCIN 500 MG/1
500 TABLET, FILM COATED ORAL EVERY 12 HOURS
Qty: 28 TABLET | Refills: 0 | Status: SHIPPED | OUTPATIENT
Start: 2018-05-30 | End: 2018-06-13

## 2018-05-31 ENCOUNTER — TELEPHONE (OUTPATIENT)
Dept: GASTROENTEROLOGY | Facility: CLINIC | Age: 58
End: 2018-05-31

## 2018-05-31 ENCOUNTER — PATIENT MESSAGE (OUTPATIENT)
Dept: ENDOSCOPY | Facility: HOSPITAL | Age: 58
End: 2018-05-31

## 2018-05-31 DIAGNOSIS — A04.8 H. PYLORI INFECTION: Primary | ICD-10-CM

## 2018-05-31 NOTE — TELEPHONE ENCOUNTER
----- Message from Malvin Lozano MD sent at 5/30/2018 12:48 PM CDT -----  Please let the patient know some of the colon polyps were adenoma. Need colonoscopy in 6 months and need appointment with [facility].    The biopsies of the stomach showed gastritis with H pylori infection. Need Amoxicillin 1 gram/Clarithromycin 500 mg/Protonix 40 mg orally twice a day for 14 days. Need to hold the Lipitor during the antibiotic regimen. After the 14 days he need to take the Protonix 40 mg orally once a day for 10 weeks. Need H pylori stool antigen in 12 weeks after therapy complete (no PPI's 2 weeks prior to the test). ALREADY SEND THE PRESCRIPTIONS TO HIS PHARMACY.

## 2018-06-05 ENCOUNTER — OFFICE VISIT (OUTPATIENT)
Dept: SURGERY | Facility: CLINIC | Age: 58
End: 2018-06-05
Payer: COMMERCIAL

## 2018-06-05 ENCOUNTER — TELEPHONE (OUTPATIENT)
Dept: ENDOSCOPY | Facility: HOSPITAL | Age: 58
End: 2018-06-05

## 2018-06-05 VITALS
BODY MASS INDEX: 30.65 KG/M2 | WEIGHT: 214.06 LBS | HEIGHT: 70 IN | SYSTOLIC BLOOD PRESSURE: 160 MMHG | HEART RATE: 49 BPM | DIASTOLIC BLOOD PRESSURE: 100 MMHG

## 2018-06-05 DIAGNOSIS — D36.9 ADENOMATOUS POLYPS: Primary | ICD-10-CM

## 2018-06-05 PROCEDURE — 99999 PR PBB SHADOW E&M-EST. PATIENT-LVL III: CPT | Mod: PBBFAC,,, | Performed by: COLON & RECTAL SURGERY

## 2018-06-05 PROCEDURE — 3080F DIAST BP >= 90 MM HG: CPT | Mod: CPTII,S$GLB,, | Performed by: COLON & RECTAL SURGERY

## 2018-06-05 PROCEDURE — 3077F SYST BP >= 140 MM HG: CPT | Mod: CPTII,S$GLB,, | Performed by: COLON & RECTAL SURGERY

## 2018-06-05 PROCEDURE — 3008F BODY MASS INDEX DOCD: CPT | Mod: CPTII,S$GLB,, | Performed by: COLON & RECTAL SURGERY

## 2018-06-05 PROCEDURE — 99204 OFFICE O/P NEW MOD 45 MIN: CPT | Mod: S$GLB,,, | Performed by: COLON & RECTAL SURGERY

## 2018-06-05 RX ORDER — AMOXICILLIN 500 MG/1
CAPSULE ORAL
Refills: 0 | COMMUNITY
Start: 2018-05-30 | End: 2018-08-02

## 2018-06-05 NOTE — LETTER
June 6, 2018      Malvin Lozano MD  [address]  [facility] [address]           [facility]  [address]  [facility] [address]  Phone: [phone]          Patient: Jenni Prescott Jr.   MR Number: [id number]   YOB: 1960   Date of Visit: 6/5/2018       Dear Dr. Malvin Lozano:    Thank you for referring Jenni Prescott to me for evaluation. Attached you will find relevant portions of my assessment and plan of care.    If you have questions, please do not hesitate to call me. I look forward to following Jenni Prescott along with you.    Sincerely,    Papa Lopez MD    Enclosure  CC:  No Recipients    If you would like to receive this communication electronically, please contact [email or web address] or [phone] to request more information on [employer] Link access.    For providers and/or their staff who would like to refer a patient to [facility], please contact us through our one-stop-shop provider referral line, [facility], at [phone].    If you feel you have received this communication in error or would no longer like to receive these types of communications, please e-mail [email or web address]

## 2018-06-06 NOTE — PROGRESS NOTES
CRS Office Visit    SUBJECTIVE:     Chief Complaint: Polyposis    History of Present Illness:  Patient is a 57 y.o. male presents with with a history of multiple colonic polyps.  These are adenomatous polyps.  He has had multiple colonoscopies with 100s of polyps seen and biopsied on colonoscopy some adenomatous some hyperplastic.  He is here for discussion of surgery.  He denies any change in his bowel habits no nausea vomiting no abdominal pain.  There is no family history of polyposis or polyposis syndromes.  Or HNP cc associated cancers.2    Review of patient's allergies indicates:   Allergen Reactions    Codeine        Past Medical History:   Diagnosis Date    Hypertension      Past Surgical History:   Procedure Laterality Date    COLONOSCOPY N/A 1/8/2018    Procedure: COLONOSCOPY;  Surgeon: Carson Yañez Jr., MD;  Location: [facility] ENDO;  Service: Endoscopy;  Laterality: N/A;    COLONOSCOPY N/A 2/19/2018    Procedure: COLONOSCOPY/Polypectomy;  Surgeon: Rex Blanco MD;  Location: [facility] ENDO;  Service: Endoscopy;  Laterality: N/A;    COLONOSCOPY N/A 5/21/2018    Procedure: COLONOSCOPY/Miralax Split;  Surgeon: Malvin Lozano MD;  Location: [facility] ENDO;  Service: Endoscopy;  Laterality: N/A;    KNEE SURGERY Bilateral     states he has screws    KNEE SURGERY      3 reconstructive surgeries 20yrs go     WRIST SURGERY      2015     Family History   Problem Relation Age of Onset    No Known Problems Mother     Cancer Father     Lung cancer Father     Hyperlipidemia Brother     Hypertension Brother      Social History   Substance Use Topics    Smoking status: Never Smoker    Smokeless tobacco: Never Used    Alcohol use No      Comment:  quit         Review of Systems:  Review of Systems      Constitutional: No fever, chills, or change in activity or appetite.      HENT: No hearing loss, swelling, neck pain or stiffness.       Eyes: No  Itching, discharge, and visual disturbance.       "Respiratory: No cough, choking or shortness of breath.       Cardiovascular: No leg swelling or chest pain      Gastrointestinal: No abdominal distention or change in bowel habbits     Genitourinary: No dysuria, frequency or flank pain.      Musculoskeletal: No trouble walking or arthralgias.      Neurological: No weakness, dizziness, or seizures .      Hematological: No adenopathy.      Psychiatric/Behavioral: No hallucinations or changes in behavior.  Remainder ROS  Negative except per HPI    OBJECTIVE:     Vital Signs (Most Recent)  BP (!) 160/100 (BP Location: Left arm, Patient Position: Sitting, BP Method: Large (Automatic))   Pulse (!) 49   Ht 5' 10" (1.778 m)   Wt 97.1 kg (214 lb 1.1 oz)   BMI 30.72 kg/m²     Physical Exam:  General: White male in NAD sitting in chair in clinic  Neuro: aaox4 maex4 perrl  Respiratory: resps even unlabored  Cardiac: cap refill <2 sec  Abdomen: Abdomen soft, nontender. BS normal. No masses, organomegaly or scars.  Extremities: Warm dry and intact       ASSESSMENT/PLAN:     Jenni was seen today for consult, colon polyps and h pylori.    Diagnoses and all orders for this visit:    Adenomatous polyps  -     Case request GI: COLONOSCOPY     have had a long discussion with him and his father concerning polyposis and polyposis type syndromes.  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  We will plan on genetic testing.  Also we will plan on flexible sigmoidoscopy to assess for possible rectal sparing.  At that point will determine the need for a total abdominal colectomy versus a proctocolectomy with ileal pouch anal anastomosis.  He agrees with this plan and will be scheduled for both  45 min was spent with the patient of which greater than 50% was spent in counciling    "

## 2018-06-11 ENCOUNTER — ANESTHESIA (OUTPATIENT)
Dept: ENDOSCOPY | Facility: HOSPITAL | Age: 58
End: 2018-06-11
Payer: COMMERCIAL

## 2018-06-11 ENCOUNTER — ANESTHESIA EVENT (OUTPATIENT)
Dept: ENDOSCOPY | Facility: HOSPITAL | Age: 58
End: 2018-06-11
Payer: COMMERCIAL

## 2018-06-11 ENCOUNTER — HOSPITAL ENCOUNTER (OUTPATIENT)
Facility: HOSPITAL | Age: 58
Discharge: HOME | End: 2018-06-11
Attending: COLON & RECTAL SURGERY | Admitting: COLON & RECTAL SURGERY
Payer: COMMERCIAL

## 2018-06-11 VITALS
HEART RATE: 50 BPM | OXYGEN SATURATION: 98 % | TEMPERATURE: 98 F | BODY MASS INDEX: 29.35 KG/M2 | WEIGHT: 205 LBS | RESPIRATION RATE: 16 BRPM | HEIGHT: 70 IN | SYSTOLIC BLOOD PRESSURE: 166 MMHG | DIASTOLIC BLOOD PRESSURE: 90 MMHG

## 2018-06-11 DIAGNOSIS — Z12.11 SPECIAL SCREENING FOR MALIGNANT NEOPLASMS, COLON: ICD-10-CM

## 2018-06-11 PROCEDURE — S5010 5% DEXTROSE AND 0.45% SALINE: HCPCS | Performed by: NURSE ANESTHETIST, CERTIFIED REGISTERED

## 2018-06-11 PROCEDURE — E9220 PRA ENDO ANESTHESIA: HCPCS | Mod: ,,, | Performed by: NURSE ANESTHETIST, CERTIFIED REGISTERED

## 2018-06-11 PROCEDURE — 45331 SIGMOIDOSCOPY AND BIOPSY: CPT | Mod: ,,, | Performed by: COLON & RECTAL SURGERY

## 2018-06-11 PROCEDURE — 25000003 PHARM REV CODE 250: Performed by: NURSE ANESTHETIST, CERTIFIED REGISTERED

## 2018-06-11 PROCEDURE — 63600175 PHARM REV CODE 636 W HCPCS: Performed by: NURSE ANESTHETIST, CERTIFIED REGISTERED

## 2018-06-11 PROCEDURE — 45331 SIGMOIDOSCOPY AND BIOPSY: CPT | Performed by: COLON & RECTAL SURGERY

## 2018-06-11 PROCEDURE — 37000009 HC ANESTHESIA EA ADD 15 MINS: Performed by: COLON & RECTAL SURGERY

## 2018-06-11 PROCEDURE — 27201012 HC FORCEPS, HOT/COLD, DISP: Performed by: COLON & RECTAL SURGERY

## 2018-06-11 PROCEDURE — 88305 TISSUE EXAM BY PATHOLOGIST: CPT | Mod: 26,,, | Performed by: PATHOLOGY

## 2018-06-11 PROCEDURE — 88305 TISSUE EXAM BY PATHOLOGIST: CPT | Performed by: PATHOLOGY

## 2018-06-11 PROCEDURE — 37000008 HC ANESTHESIA 1ST 15 MINUTES: Performed by: COLON & RECTAL SURGERY

## 2018-06-11 RX ORDER — SODIUM CHLORIDE 9 MG/ML
INJECTION, SOLUTION INTRAVENOUS CONTINUOUS
Status: DISCONTINUED | OUTPATIENT
Start: 2018-06-11 | End: 2018-06-11 | Stop reason: HOSPADM

## 2018-06-11 RX ORDER — LIDOCAINE HCL/PF 100 MG/5ML
SYRINGE (ML) INTRAVENOUS
Status: DISCONTINUED | OUTPATIENT
Start: 2018-06-11 | End: 2018-06-11

## 2018-06-11 RX ORDER — PROPOFOL 10 MG/ML
VIAL (ML) INTRAVENOUS CONTINUOUS PRN
Status: DISCONTINUED | OUTPATIENT
Start: 2018-06-11 | End: 2018-06-11

## 2018-06-11 RX ORDER — DEXTROSE MONOHYDRATE AND SODIUM CHLORIDE 5; .45 G/100ML; G/100ML
INJECTION, SOLUTION INTRAVENOUS CONTINUOUS
Status: CANCELLED | OUTPATIENT
Start: 2018-06-11

## 2018-06-11 RX ORDER — PROPOFOL 10 MG/ML
VIAL (ML) INTRAVENOUS
Status: DISCONTINUED | OUTPATIENT
Start: 2018-06-11 | End: 2018-06-11

## 2018-06-11 RX ORDER — DEXTROSE MONOHYDRATE AND SODIUM CHLORIDE 5; .45 G/100ML; G/100ML
INJECTION, SOLUTION INTRAVENOUS CONTINUOUS PRN
Status: DISCONTINUED | OUTPATIENT
Start: 2018-06-11 | End: 2018-06-11

## 2018-06-11 RX ADMIN — PROPOFOL 175 MCG/KG/MIN: 10 INJECTION, EMULSION INTRAVENOUS at 10:06

## 2018-06-11 RX ADMIN — LIDOCAINE HYDROCHLORIDE 100 MG: 20 INJECTION, SOLUTION INTRAVENOUS at 10:06

## 2018-06-11 RX ADMIN — PROPOFOL 100 MG: 10 INJECTION, EMULSION INTRAVENOUS at 10:06

## 2018-06-11 RX ADMIN — DEXTROSE MONOHYDRATE AND SODIUM CHLORIDE: 5; .45 INJECTION, SOLUTION INTRAVENOUS at 10:06

## 2018-06-11 NOTE — ANESTHESIA PREPROCEDURE EVALUATION
06/11/2018  Jenni Prescott Jr. is a 57 y.o., male.    Placement Date: 04/22/15; Placement Time: 1611; Method of Intubation: Glidescope; Inserted by: Anesthesia MD; Airway Device: Endotracheal Tube; Mask Ventilation: Mod Diff - oral; Intubated: Postinduction; Blade: Other (see comments) (glidescope); Airway Device Size: 8.0; Style: Cuffed; Cuff Inflation: Minimal occlusive pressure; Inflation Amount: 4; Placement Verified By: Auscultation, Capnometry; Grade: Grade I (Grade 1 with glidescope. Grade 4 x 5 attempts with DL); Complicating Factors: Anterior larynx; Intubation Findings: Positive EtCO2, Bilateral breath sounds, Atraumatic/Condition of teeth unchanged;  Depth of Insertion: 22; Securment: Lips; Complications: None; Breath Sounds: Equal Bilateral; Insertion Attempts: 5 (enter comment) (anterior ); Removal Date: 04/22/15;  Removal Time: 1911    Anesthesia Evaluation    I have reviewed the Patient Summary Reports.     I have reviewed the Medications.     Review of Systems  Anesthesia Hx:  History of prior surgery of interest to airway management or planning: Personal Hx of Anesthesia complications  Difficult Intubation, glidescope used successfully   Social:  Non-Smoker    Hematology/Oncology:  Hematology Normal   Oncology Normal     EENT/Dental:EENT/Dental Normal   Cardiovascular:   Hypertension    Pulmonary:  Pulmonary Normal    Renal/:  Renal/ Normal     Hepatic/GI:  Hepatic/GI Normal    Musculoskeletal:  Musculoskeletal Normal    Neurological:  Neurology Normal    Endocrine:  Endocrine Normal    Dermatological:  Skin Normal    Psych:  Psychiatric Normal           Physical Exam  General:  Well nourished    Airway/Jaw/Neck:  Airway Findings: Mouth Opening: Normal Tongue: Normal  General Airway Assessment: Adult  Mallampati: II  TM Distance: Normal, at least 6 cm      Dental:  Dental Findings:  In tact             Anesthesia Plan  Type of Anesthesia, risks & benefits discussed:  Anesthesia Type:  general  Patient's Preference: General  Intra-op Monitoring Plan: standard ASA monitors  Intra-op Monitoring Plan Comments:   Post Op Pain Control Plan:   Post Op Pain Control Plan Comments:   Induction:   IV  Beta Blocker:  Patient is not currently on a Beta-Blocker (No further documentation required).       Informed Consent: Patient understands risks and agrees with Anesthesia plan.  Questions answered. Anesthesia consent signed with patient.  ASA Score: 2     Day of Surgery Review of History & Physical:    H&P update referred to the surgeon.         Ready For Surgery From Anesthesia Perspective.

## 2018-06-11 NOTE — TRANSFER OF CARE
"Anesthesia Transfer of Care Note    Patient: Jenni Prescott Jr.    Procedure(s) Performed: Procedure(s) (LRB):  COLONOSCOPY (N/A)    Patient location: PACU    Anesthesia Type: general    Transport from OR: Transported from OR on room air with adequate spontaneous ventilation    Post pain: adequate analgesia    Post assessment: no apparent anesthetic complications and tolerated procedure well    Post vital signs: stable    Level of consciousness: sedated    Nausea/Vomiting: no nausea/vomiting    Complications: none    Transfer of care protocol was followed      Last vitals:   Visit Vitals  /78   Pulse (!) 57   Temp 36.7 °C (98.1 °F)   Resp 16   Ht 5' 10" (1.778 m)   Wt 93 kg (205 lb)   SpO2 (!) 93%   BMI 29.41 kg/m²     "

## 2018-06-11 NOTE — H&P
Endoscopy H&P    Procedure : Flexible sigmoidoscopy      personal history of colon polyps/polyp syndrome, evaluation for possible rectal sparing      Past Medical History:   Diagnosis Date    Hypertension              Review of Systems -ROS:  GENERAL: No fever, chills, fatigability or weight loss.  CHEST: Denies ORR, cyanosis, wheezing, cough and sputum production.  CARDIOVASCULAR: Denies chest pain, PND, orthopnea or reduced exercise tolerance.   Musculoskeletal ROS: negative for - gait disturbance or joint pain  Neurological ROS: negative for - confusion or memory loss        Physical Exam:  General: well developed, well nourished, no distress  Head: normocephalic  Neck: supple, symmetrical, trachea midline  Lungs:  clear to auscultation bilaterally and normal respiratory effort  Heart: regular rate and rhythm, S1, S2 normal, no murmur, rub or gallop and regular rate and rhythm  Abdomen: soft, non-tender non-distented; bowel sounds normal; no masses,  no organomegaly  Extremities: no cyanosis or edema, or clubbing       Moderate Sedation (choice): Mallampati Score 1    ASA : II    IMP: personal history of colon polyps/polyp syndrome, evaluation for possible rectal sparing    Plan: Colonoscopy with Moderate sedation.  I have explained the procedure including indications, alternatives, expected outcomes and potential complications. The patient appears to understand and gives informed consent. The patient is medically ready for surgery.  Have seen and examined the patient with the fellow and agree with their plan.  AMARI SHERMAN

## 2018-06-11 NOTE — ANESTHESIA POSTPROCEDURE EVALUATION
"Anesthesia Post Evaluation    Patient: Jenni Prescott JrPatricia    Procedure(s) Performed: Procedure(s) (LRB):  COLONOSCOPY (N/A)    Final Anesthesia Type: general  Patient location during evaluation: PACU  Patient participation: Yes- Able to Participate  Level of consciousness: awake and alert and oriented  Post-procedure vital signs: reviewed and stable  Pain management: adequate  Airway patency: patent  PONV status at discharge: No PONV  Anesthetic complications: no      Cardiovascular status: stable  Respiratory status: unassisted, spontaneous ventilation and room air  Hydration status: euvolemic  Follow-up not needed.        Visit Vitals  /84   Pulse 60   Temp 36.7 °C (98.1 °F)   Resp 20   Ht 5' 10" (1.778 m)   Wt 93 kg (205 lb)   SpO2 96%   BMI 29.41 kg/m²       Pain/Milly Score: Pain Assessment Performed: Yes (6/11/2018 11:20 AM)  Presence of Pain: denies (6/11/2018 11:20 AM)  Milly Score: 9 (6/11/2018 11:05 AM)      "

## 2018-06-11 NOTE — [employer] PATIENT INSTRUCTIONS
Discharge Summary/Instructions after an Endoscopic Procedure  Patient Name: Jenni Prescott  Patient MRN: [id number]  Patient YOB: 1960  Monday, June 11, 2018  Papa Lopez MD  RESTRICTIONS:  During your procedure today, you received medications for sedation.  These   medications may affect your judgment, balance and coordination.  Therefore,   for 24 hours, you have the following restrictions:   - DO NOT drive a car, operate machinery, make legal/financial decisions,   sign important papers or drink alcohol.    ACTIVITY:  Today: no heavy lifting, straining or running due to procedural   sedation/anesthesia.  The following day: return to full activity including work.  DIET:  Eat and drink normally unless instructed otherwise.     TREATMENT FOR COMMON SIDE EFFECTS:  - Mild abdominal pain, nausea, belching, bloating or excessive gas:  rest,   eat lightly and use a heating pad.  - Sore Throat: treat with throat lozenges and/or gargle with warm salt   water.  - Because air was used during the procedure, expelling large amounts of air   from your rectum or belching is normal.  - If a bowel prep was taken, you may not have a bowel movement for 1-3 days.    This is normal.  SYMPTOMS TO WATCH FOR AND REPORT TO YOUR PHYSICIAN:  1. Abdominal pain or bloating, other than gas cramps.  2. Chest pain.  3. Back pain.  4. Signs of infection such as: chills or fever occurring within 24 hours   after the procedure.  5. Rectal bleeding, which would show as bright red, maroon, or black stools.   (A tablespoon of blood from the rectum is not serious, especially if   hemorrhoids are present.)  6. Vomiting.  7. Weakness or dizziness.  GO DIRECTLY TO THE NEAREST EMERGENCY ROOM IF YOU HAVE ANY OF THE FOLLOWING:      Difficulty breathing              Chills and/or fever over 101 F   Persistent vomiting and/or vomiting blood   Severe abdominal pain   Severe chest pain   Black, tarry stools   Bleeding- more than one tablespoon   Any  other symptom or condition that you feel may need urgent attention  Your doctor recommends these additional instructions:  If any biopsies were taken, your doctors clinic will contact you in 1 to 2   weeks with any results.  - Discharge patient to home.   - Return to my office in 2 weeks.  For questions, problems or results please call your physician - Papa Lopez MD at Work:  [phone].  [facility], EMERGENCY ROOM PHONE NUMBER: [phone]  IF A COMPLICATION OR EMERGENCY SITUATION ARISES AND YOU ARE UNABLE TO REACH   YOUR PHYSICIAN - GO DIRECTLY TO THE EMERGENCY ROOM.  Papa Lopez MD  6/11/2018 11:03:56 AM  This report has been verified and signed electronically.  [employer]

## 2018-06-14 ENCOUNTER — TELEPHONE (OUTPATIENT)
Dept: SURGERY | Facility: CLINIC | Age: 58
End: 2018-06-14

## 2018-06-14 NOTE — TELEPHONE ENCOUNTER
Left message that if her  is passing a cup of blood he will need to go to the ED. To her to have him monitor the amount of blood. If increasing to call and make an appt.

## 2018-06-14 NOTE — TELEPHONE ENCOUNTER
Spoke with Liset and informed her that Dr. Lopez is filling out the paper work now and they will hear from informed DNA for the next step.

## 2018-06-14 NOTE — TELEPHONE ENCOUNTER
----- Message from Deb Pike sent at 6/14/2018  8:42 AM CDT -----  Contact: Liset pt wife#[phone]  Needs Advice    Reason for call:    She states that pt did not receive call to schedule genetic test.  Communication Preference:callback  Additional Information:

## 2018-06-14 NOTE — TELEPHONE ENCOUNTER
----- Message from Angelia Harrell MA sent at 6/14/2018  4:01 PM CDT -----  Contact: Mrs. Prescott  spouse    [phone]  Needs Advice    Reason for call:    Her spouse is seeing more blood since the colonoscopy on Monday, 6-11-18  Communication Preference:  Phone# [email or web address]  Additional Information:  My spouse wanted me to call back and make you aware of this

## 2018-06-18 ENCOUNTER — TELEPHONE (OUTPATIENT)
Dept: ENDOSCOPY | Facility: HOSPITAL | Age: 58
End: 2018-06-18

## 2018-06-19 ENCOUNTER — OFFICE VISIT (OUTPATIENT)
Dept: SURGERY | Facility: CLINIC | Age: 58
End: 2018-06-19
Payer: COMMERCIAL

## 2018-06-19 VITALS
SYSTOLIC BLOOD PRESSURE: 136 MMHG | BODY MASS INDEX: 31.09 KG/M2 | WEIGHT: 217.13 LBS | DIASTOLIC BLOOD PRESSURE: 90 MMHG | HEIGHT: 70 IN | HEART RATE: 57 BPM

## 2018-06-19 DIAGNOSIS — Z86.010 HISTORY OF COLON POLYPS: Primary | ICD-10-CM

## 2018-06-19 PROCEDURE — 3075F SYST BP GE 130 - 139MM HG: CPT | Mod: CPTII,S$GLB,, | Performed by: COLON & RECTAL SURGERY

## 2018-06-19 PROCEDURE — 99214 OFFICE O/P EST MOD 30 MIN: CPT | Mod: S$GLB,,, | Performed by: COLON & RECTAL SURGERY

## 2018-06-19 PROCEDURE — 99999 PR PBB SHADOW E&M-EST. PATIENT-LVL III: CPT | Mod: PBBFAC,,, | Performed by: COLON & RECTAL SURGERY

## 2018-06-19 PROCEDURE — 3008F BODY MASS INDEX DOCD: CPT | Mod: CPTII,S$GLB,, | Performed by: COLON & RECTAL SURGERY

## 2018-06-19 PROCEDURE — 3080F DIAST BP >= 90 MM HG: CPT | Mod: CPTII,S$GLB,, | Performed by: COLON & RECTAL SURGERY

## 2018-06-19 NOTE — LETTER
June 20, 2018      Radha Antoine MD  [address] [facility] [address]           [facility]  [address]  [facility] [address]  Phone: [phone]          Patient: Jenni Prescott Jr.   MR Number: [id number]   YOB: 1960   Date of Visit: 6/19/2018       Dear Dr. Radha Antoine:    Thank you for referring Jenni Prescott to me for evaluation. Attached you will find relevant portions of my assessment and plan of care.    If you have questions, please do not hesitate to call me. I look forward to following Jenni Prescott along with you.    Sincerely,    Papa Lopez MD    Enclosure  CC:  No Recipients    If you would like to receive this communication electronically, please contact [email or web address] or [phone] to request more information on [employer] Link access.    For providers and/or their staff who would like to refer a patient to [facility], please contact us through our one-stop-shop provider referral line, [facility], at [phone].    If you feel you have received this communication in error or would no longer like to receive these types of communications, please e-mail [email or web address]

## 2018-06-20 NOTE — PROGRESS NOTES
CRS Office Visit    SUBJECTIVE:     Chief Complaint: Polyposis    History of Present Illness:  Patient is a 57 y.o. male presents with with a history of multiple colonic polyps.  These are adenomatous polyps.  He has had multiple colonoscopies with 100s of polyps seen and biopsied on colonoscopy some adenomatous some hyperplastic.  He is here for discussion of surgery.  He denies any change in his bowel habits no nausea vomiting no abdominal pain.  There is no family history of polyposis or polyposis syndromes.  Or HNP cc associated cancers.2. He underwent a recent flex sig to identify if there was rectal sparing. He was found to have a large polyp burden but they were all hyperplastic    Review of patient's allergies indicates:   Allergen Reactions    Codeine        Past Medical History:   Diagnosis Date    Hypertension      Past Surgical History:   Procedure Laterality Date    COLONOSCOPY N/A 1/8/2018    Procedure: COLONOSCOPY;  Surgeon: Carson Yañez Jr., MD;  Location: [facility];  Service: Endoscopy;  Laterality: N/A;    COLONOSCOPY N/A 2/19/2018    Procedure: COLONOSCOPY/Polypectomy;  Surgeon: Rex Blanco MD;  Location: [facility] ENDO;  Service: Endoscopy;  Laterality: N/A;    COLONOSCOPY N/A 5/21/2018    Procedure: COLONOSCOPY/Miralax Split;  Surgeon: Malvin Lozano MD;  Location: [facility] ENDO;  Service: Endoscopy;  Laterality: N/A;    KNEE SURGERY Bilateral     states he has screws    KNEE SURGERY      3 reconstructive surgeries 20yrs go     WRIST SURGERY      2015     Family History   Problem Relation Age of Onset    No Known Problems Mother     Cancer Father     Lung cancer Father     Hyperlipidemia Brother     Hypertension Brother      Social History   Substance Use Topics    Smoking status: Never Smoker    Smokeless tobacco: Never Used    Alcohol use No      Comment:  quit         Review of Systems:  Review of Systems      Constitutional: No fever, chills, or change in activity or  "appetite.      HENT: No hearing loss, swelling, neck pain or stiffness.       Eyes: No  Itching, discharge, and visual disturbance.      Respiratory: No cough, choking or shortness of breath.       Cardiovascular: No leg swelling or chest pain      Gastrointestinal: No abdominal distention or change in bowel habbits     Genitourinary: No dysuria, frequency or flank pain.      Musculoskeletal: No trouble walking or arthralgias.      Neurological: No weakness, dizziness, or seizures .      Hematological: No adenopathy.      Psychiatric/Behavioral: No hallucinations or changes in behavior.  Remainder ROS  Negative except per HPI    OBJECTIVE:   BP (!) 136/90 (BP Location: Left arm, Patient Position: Sitting, BP Method: Large (Automatic))   Pulse (!) 57   Ht 5' 10" (1.778 m)   Wt 98.5 kg (217 lb 2.5 oz)   BMI 31.16 kg/m²       Physical Exam:  General: White male in NAD sitting in chair in clinic  Neuro: aaox4 maex4 perrl  Respiratory: resps even unlabored  Cardiac: cap refill <2 sec  Abdomen: Abdomen soft, nontender. BS normal. No masses, organomegaly or scars.  Extremities: Warm dry and intact       ASSESSMENT/PLAN:     Adenomatous polyps with rectal hyperpalstic polyps.   Will await genetic testing and present to MDT to determine if TAC with ileorectal vs proctocolectomy with jpouch is appropriate.  45 min was spent with the patient of which greater than 50% was spent in counciling    "

## 2018-06-29 ENCOUNTER — TELEPHONE (OUTPATIENT)
Dept: SURGERY | Facility: HOSPITAL | Age: 58
End: 2018-06-29

## 2018-08-02 ENCOUNTER — OFFICE VISIT (OUTPATIENT)
Dept: SURGERY | Facility: CLINIC | Age: 58
End: 2018-08-02
Payer: COMMERCIAL

## 2018-08-02 VITALS
SYSTOLIC BLOOD PRESSURE: 140 MMHG | DIASTOLIC BLOOD PRESSURE: 89 MMHG | WEIGHT: 211.63 LBS | HEART RATE: 58 BPM | BODY MASS INDEX: 30.3 KG/M2 | HEIGHT: 70 IN

## 2018-08-02 DIAGNOSIS — Z86.010 HX OF ADENOMATOUS COLONIC POLYPS: Primary | ICD-10-CM

## 2018-08-02 PROCEDURE — 3077F SYST BP >= 140 MM HG: CPT | Mod: CPTII,S$GLB,, | Performed by: COLON & RECTAL SURGERY

## 2018-08-02 PROCEDURE — 3008F BODY MASS INDEX DOCD: CPT | Mod: CPTII,S$GLB,, | Performed by: COLON & RECTAL SURGERY

## 2018-08-02 PROCEDURE — 99215 OFFICE O/P EST HI 40 MIN: CPT | Mod: S$GLB,,, | Performed by: COLON & RECTAL SURGERY

## 2018-08-02 PROCEDURE — 99999 PR PBB SHADOW E&M-EST. PATIENT-LVL III: CPT | Mod: PBBFAC,,, | Performed by: COLON & RECTAL SURGERY

## 2018-08-02 PROCEDURE — 3079F DIAST BP 80-89 MM HG: CPT | Mod: CPTII,S$GLB,, | Performed by: COLON & RECTAL SURGERY

## 2018-08-02 NOTE — PROGRESS NOTES
CRS H&P  Chief Complaint: Polyposis     History of Present Illness:  Patient is a 57 y.o. male presents with with a history of multiple colonic polyps.  These are adenomatous polyps.  He has had multiple colonoscopies with 100s of polyps seen and biopsied on colonoscopy some adenomatous some hyperplastic.  He is here for discussion of surgery.  He denies any change in his bowel habits no nausea vomiting no abdominal pain.  There is no family history of polyposis or polyposis syndromes.  Or HNP cc associated cancers.2. He underwent a recent flex sig to identify if there was rectal sparing. He was found to have a large polyp burden but they were all hyperplastic have had him perform Buccal swab genetic testing          Review of patient's allergies indicates:   Allergen Reactions    Codeine                Past Medical History:   Diagnosis Date    Hypertension              Past Surgical History:   Procedure Laterality Date    COLONOSCOPY N/A 1/8/2018     Procedure: COLONOSCOPY;  Surgeon: Carson Yañez Jr., MD;  Location: [facility];  Service: Endoscopy;  Laterality: N/A;    COLONOSCOPY N/A 2/19/2018     Procedure: COLONOSCOPY/Polypectomy;  Surgeon: Rex Blanco MD;  Location: [facility];  Service: Endoscopy;  Laterality: N/A;    COLONOSCOPY N/A 5/21/2018     Procedure: COLONOSCOPY/Miralax Split;  Surgeon: Malvin Lozano MD;  Location: [facility];  Service: Endoscopy;  Laterality: N/A;    KNEE SURGERY Bilateral       states he has screws    KNEE SURGERY         3 reconstructive surgeries 20yrs go     WRIST SURGERY         2015            Family History   Problem Relation Age of Onset    No Known Problems Mother      Cancer Father      Lung cancer Father      Hyperlipidemia Brother      Hypertension Brother               Social History   Substance Use Topics    Smoking status: Never Smoker    Smokeless tobacco: Never Used    Alcohol use No         Comment:  quit          Review of  "Systems:  Review of Systems      Constitutional: No fever, chills, or change in activity or appetite.      HENT: No hearing loss, swelling, neck pain or stiffness.       Eyes: No  Itching, discharge, and visual disturbance.      Respiratory: No cough, choking or shortness of breath.       Cardiovascular: No leg swelling or chest pain      Gastrointestinal: No abdominal distention or change in bowel habbits     Genitourinary: No dysuria, frequency or flank pain.      Musculoskeletal: No trouble walking or arthralgias.      Neurological: No weakness, dizziness, or seizures .      Hematological: No adenopathy.      Psychiatric/Behavioral: No hallucinations or changes in behavior.  Remainder ROS  Negative except per HPI     OBJECTIVE:   BP (!) 136/90 (BP Location: Left arm, Patient Position: Sitting, BP Method: Large (Automatic))   Pulse (!) 57   Ht 5' 10" (1.778 m)   Wt 98.5 kg (217 lb 2.5 oz)   BMI 31.16 kg/m²         Physical Exam:  General: White male in NAD sitting in chair in clinic  Neuro: aaox4 maex4 perrl  Respiratory: resps even unlabored  Cardiac: cap refill <2 sec  Abdomen: Abdomen soft, nontender. BS normal. No masses, organomegaly or scars.  Extremities: Warm dry and intact        ASSESSMENT/PLAN:      Adenomatous polyps with rectal hyperpalstic polyps.   Will plan on LAP TAC with left ureteral cathater  45 min was spent with the patient of which greater than 50% was spent in counciling    "

## 2018-08-02 NOTE — PROGRESS NOTES
CRS Office Visit    SUBJECTIVE:     Chief Complaint: Polyposis    History of Present Illness:  Patient is a 57 y.o. male presents with with a history of multiple colonic polyps.  These are adenomatous polyps.  He has had multiple colonoscopies with 100s of polyps seen and biopsied on colonoscopy some adenomatous some hyperplastic.  He is here for discussion of surgery.  He denies any change in his bowel habits no nausea vomiting no abdominal pain.  There is no family history of polyposis or polyposis syndromes.  Or HNP cc associated cancers.2. He underwent a recent flex sig to identify if there was rectal sparing. He was found to have a large polyp burden but they were all hyperplastic have had him perform Buccal swab genetic testing    Review of patient's allergies indicates:   Allergen Reactions    Codeine        Past Medical History:   Diagnosis Date    Hypertension      Past Surgical History:   Procedure Laterality Date    COLONOSCOPY N/A 1/8/2018    Procedure: COLONOSCOPY;  Surgeon: Carson Yañez Jr., MD;  Location: [facility];  Service: Endoscopy;  Laterality: N/A;    COLONOSCOPY N/A 2/19/2018    Procedure: COLONOSCOPY/Polypectomy;  Surgeon: Rex Blanco MD;  Location: [facility];  Service: Endoscopy;  Laterality: N/A;    COLONOSCOPY N/A 5/21/2018    Procedure: COLONOSCOPY/Miralax Split;  Surgeon: Malvin Lozano MD;  Location: [facility];  Service: Endoscopy;  Laterality: N/A;    KNEE SURGERY Bilateral     states he has screws    KNEE SURGERY      3 reconstructive surgeries 20yrs go     WRIST SURGERY      2015     Family History   Problem Relation Age of Onset    No Known Problems Mother     Cancer Father     Lung cancer Father     Hyperlipidemia Brother     Hypertension Brother      Social History   Substance Use Topics    Smoking status: Never Smoker    Smokeless tobacco: Never Used    Alcohol use No      Comment:  quit         Review of Systems:  Review of Systems       "Constitutional: No fever, chills, or change in activity or appetite.      HENT: No hearing loss, swelling, neck pain or stiffness.       Eyes: No  Itching, discharge, and visual disturbance.      Respiratory: No cough, choking or shortness of breath.       Cardiovascular: No leg swelling or chest pain      Gastrointestinal: No abdominal distention or change in bowel habbits     Genitourinary: No dysuria, frequency or flank pain.      Musculoskeletal: No trouble walking or arthralgias.      Neurological: No weakness, dizziness, or seizures .      Hematological: No adenopathy.      Psychiatric/Behavioral: No hallucinations or changes in behavior.  Remainder ROS  Negative except per HPI    OBJECTIVE:   BP (!) 136/90 (BP Location: Left arm, Patient Position: Sitting, BP Method: Large (Automatic))   Pulse (!) 57   Ht 5' 10" (1.778 m)   Wt 98.5 kg (217 lb 2.5 oz)   BMI 31.16 kg/m²       Physical Exam:  General: White male in NAD sitting in chair in clinic  Neuro: aaox4 maex4 perrl  Respiratory: resps even unlabored  Cardiac: cap refill <2 sec  Abdomen: Abdomen soft, nontender. BS normal. No masses, organomegaly or scars.  Extremities: Warm dry and intact       ASSESSMENT/PLAN:     Adenomatous polyps with rectal hyperpalstic polyps.   Will plan on LAP TAC with left ureteral cathater  45 min was spent with the patient of which greater than 50% was spent in counciling    "

## 2018-08-02 NOTE — LETTER
August 2, 2018      Radha Antoine MD  [address] [facility] [address]           [facility]  [address]  [facility] [address]  Phone: [phone]          Patient: Jenni Prescott Jr.   MR Number: [id number]   YOB: 1960   Date of Visit: 8/2/2018       Dear Dr. Radha Antoine:    Thank you for referring Jenni Prescott to me for evaluation. Attached you will find relevant portions of my assessment and plan of care.    If you have questions, please do not hesitate to call me. I look forward to following Jenni Prescott along with you.    Sincerely,    Papa Lopez MD    Enclosure  CC:  No Recipients    If you would like to receive this communication electronically, please contact [email or web address] or [phone] to request more information on [employer] access.    For providers and/or their staff who would like to refer a patient to [facility], please contact us through our one-stop-shop provider referral line, [facility], at [phone].    If you feel you have received this communication in error or would no longer like to receive these types of communications, please e-mail [email or web address]

## 2018-08-08 ENCOUNTER — ANESTHESIA EVENT (OUTPATIENT)
Dept: SURGERY | Facility: HOSPITAL | Age: 58
DRG: 331 | End: 2018-08-08
Payer: COMMERCIAL

## 2018-08-08 NOTE — ANESTHESIA PREPROCEDURE EVALUATION
Anesthesia Assessment: Preoperative EQUATION    Planned Procedure: Procedure(s) (LRB):  COLECTOMY, TOTAL, LAPAROSCOPIC - lap total (Left)  Insertion-Catheter (Left)  Requested Anesthesia Type:General  Surgeon: Papa Lopez MD  Service: Colon and Rectal  Known or anticipated Date of Surgery:9/5/2018    Surgeon notes: reviewed and Hx of adenomatous Colonic polyps  Previous anesthesia records:6/11/18-Colonoscopy-General-no apparent anesthetic complications and tolerated procedure well  Anesthesia Hx:  History of prior surgery of interest to airway management or planning: Personal Hx of Anesthesia complications  Difficult Intubation, glidescope used successfully   Last PCP note: 6-12 months ago , within [facility] , focused visit   Subspecialty notes: Gastroenterology  Tests already available:  Results have been reviewed.EKG-1/13/16/ Last A1c-9/26/17-5.7  Plan:    Testing:  Hematology Profile, CMP and T&S      Patient  has previously scheduled Medical Appointment:None    Navigation: Tests Scheduled. Labs-Hem Profile/CMP/T&S on 8/29/18 @ 12:30p              Consults scheduled.POC & Perioperative IM Consult on 8/29/18 @ 10a & 11a             Results will be tracked by Preop Clinic.                 Danielle Spence RN  8/8/18                                                                                                                [facility]  Anesthesia Pre-Operative Evaluation         Patient Name: Jenni Prescott Jr.  YOB: 1960  MRN: [id number]    SUBJECTIVE:     Pre-operative evaluation for Procedure(s) (LRB):  COLECTOMY, TOTAL, LAPAROSCOPIC - lap total (Left)  Insertion-Catheter (Left)     09/04/2018    Jenni Prescott Jr. is a 58 y.o. male w/ a significant PMHx of HTN, HLD, GERD, and multiple colonic polyps.     Patient now presents for the above procedure(s).      LDA: 18 G left wrist    Prev airway: mod difficult mask with oral air; Grade IV view with DL x5; Grade I  view with glidescope.     Drips: None documented.    Patient Active Problem List   Diagnosis    Fall    Lumbar transverse process fracture    Fall    Multiple rib fractures    Pneumothorax, closed, traumatic    Multiple closed pelvic fractures with disruption of pelvic ring    Distal radius fracture, right    Range of motion deficit    Right wrist pain    Class 1 obesity with body mass index (BMI) of 30.0 to 30.9 in adult    Essential hypertension    History of prediabetes    Screening for colorectal cancer    Colon polyp    Special screening for malignant neoplasms, colon    Acid reflux    Serum total bilirubin elevated       Review of patient's allergies indicates:   Allergen Reactions    Codeine      Itching with Codeine , Oxycodone     Oxycodone      Itching        Current Outpatient Medications:  No current facility-administered medications for this encounter.     Current Outpatient Medications:     atorvastatin (LIPITOR) 20 MG tablet, Take 1 tablet (20 mg total) by mouth once daily. (Patient taking differently: Take 20 mg by mouth every morning. ), Disp: 90 tablet, Rfl: 3    lisinopril-hydrochlorothiazide (PRINZIDE,ZESTORETIC) 20-12.5 mg per tablet, Take one tablet every day for elevated blood pressure (Patient taking differently: Take 1 tablet by mouth every morning. Take one tablet every day for elevated blood pressure), Disp: 90 tablet, Rfl: 2    metroNIDAZOLE (FLAGYL) 500 MG tablet, Take 1 tablet (500 mg total) by mouth As instructed. Take one pill at 1pm, one pill 2pm, one pill at 11pm, Disp: 3 tablet, Rfl: 0    neomycin (MYCIFRADIN) 500 mg Tab, Take 1 tablet (500 mg total) by mouth As instructed. Take 2 pills at 1pm, 2 pills at 2pm, and 2 pills at 11pm, Disp: 6 tablet, Rfl: 0    pantoprazole (PROTONIX) 40 MG tablet, Take 1 tablet (40 mg total) by mouth 2 (two) times daily., Disp: 60 tablet, Rfl: 2    Past Surgical History:   Procedure Laterality Date    KNEE SURGERY Bilateral      states he has screws    KNEE SURGERY      3 reconstructive surgeries 20yrs go     WRIST SURGERY      2015       Social History     Socioeconomic History    Marital status:      Spouse name: Not on file    Number of children: Not on file    Years of education: Not on file    Highest education level: Not on file   Social Needs    Financial resource strain: Not on file    Food insecurity - worry: Not on file    Food insecurity - inability: Not on file    Transportation needs - medical: Not on file    Transportation needs - non-medical: Not on file   Occupational History    Not on file   Tobacco Use    Smoking status: Never Smoker    Smokeless tobacco: Never Used   Substance and Sexual Activity    Alcohol use: No     Comment:  quit     Drug use: Yes     Types: Marijuana     Comment: a few times a week, restarted about 2 months ago    Sexual activity: Yes     Partners: Female   Other Topics Concern    Not on file   Social History Narrative    Not on file       OBJECTIVE:     Vital Signs Range (Last 24H):         Significant Labs:  Lab Results   Component Value Date    WBC 7.60 08/29/2018    HGB 14.9 08/29/2018    HCT 45.8 08/29/2018     08/29/2018    CHOL 206 (H) 09/26/2017    TRIG 227 (H) 09/26/2017    HDL 33 (L) 09/26/2017    ALT 26 08/29/2018    AST 20 08/29/2018     08/29/2018    K 4.2 08/29/2018     08/29/2018    CREATININE 0.8 08/29/2018    BUN 19 08/29/2018    CO2 30 (H) 08/29/2018    TSH 2.702 01/13/2016    PSA 0.61 09/26/2017    INR 1.0 04/21/2015    HGBA1C 5.5 08/29/2018       Diagnostic Studies: No relevant studies.    EKG: No recent studies available.    2D ECHO:  No results found for this or any previous visit.      ASSESSMENT/PLAN:         Anesthesia Evaluation    I have reviewed the Patient Summary Reports.    I have reviewed the Nursing Notes.   I have reviewed the Medications.     Review of Systems  Anesthesia Hx:  glidescope used 2015 (ORIF arm); grade 1 with  glidescope; grade 4 with DL (x 5 attempts); anterior larynx History of prior surgery of interest to airway management or planning: Previous anesthesia: General colonoscopy 6/11/18 with general anesthesia.  Procedure performed at an [facility] Facility. Denies Family Hx of Anesthesia complications.    Social:  Non-Smoker, No Alcohol Use  Illicit Drug Use: Types of drugs include Marijuana,   Hematology/Oncology:  Hematology Normal   Oncology Normal     EENT/Dental:   Reading glasses   Cardiovascular:   Hypertension, well controlled hyperlipidemia  Functional Capacity good / => 4 METS, works in construction, climbs ladder, stairs; hiking; denies CP, SOB    Pulmonary:   Denies Asthma.  Denies Shortness of breath.  Denies Sleep Apnea.    Renal/:   renal calculi    Hepatic/GI:   GERD (takes Protonix. Hasn't had symptoms in over a year), well controlled Adenomatous polyps   Musculoskeletal:  Musculoskeletal Normal    Neurological:   Denies CVA. Denies Seizures.  Denies Pain    Endocrine:   Denies Diabetes.    Psych:  Psychiatric Normal           Physical Exam  General:  Well nourished    Airway/Jaw/Neck:  Airway Findings: Mouth Opening: Normal Tongue: Normal  General Airway Assessment: Adult  Mallampati: III  Improves to II with phonation.  TM Distance: 4 - 6 cm  Jaw/Neck Findings:     Neck ROM: Normal ROM      Dental:  Dental Findings:    Chest/Lungs:  Chest/Lungs Findings: Clear to auscultation, Normal Respiratory Rate     Heart/Vascular:  Heart Findings: Rate: Normal  Rhythm: Regular Rhythm  Sounds: Normal  Heart murmur: negative Vascular Findings: Normal       Mental Status:  Mental Status Findings:  Cooperative, Alert and Oriented       Pt was seen in POC 8/29/18; HISTORY OF DIFFICULT INTUBATION WITH GLIDESCOPE USED (2015) Medical optimization: please see EPIC notes for recommendations of pre-op medical consultant, Dr Mosley, for perioperative medical management./Jing Cornell RN           Anesthesia Plan  Type  of Anesthesia, risks & benefits discussed:  Anesthesia Type:  general  Patient's Preference:   Intra-op Monitoring Plan: standard ASA monitors  Intra-op Monitoring Plan Comments:   Post Op Pain Control Plan: per primary service following discharge from PACU and multimodal analgesia  Post Op Pain Control Plan Comments:   Induction:   IV  Beta Blocker:  Patient is not currently on a Beta-Blocker (No further documentation required).       Informed Consent: Patient understands risks and agrees with Anesthesia plan.  Questions answered. Anesthesia consent signed with patient.  ASA Score: 2     Day of Surgery Review of History & Physical: I have interviewed and examined the patient. I have reviewed the patient's H&P dated:  There are no significant changes.  H&P update referred to the surgeon.         Ready For Surgery From Anesthesia Perspective.

## 2018-08-08 NOTE — PRE ADMISSION SCREENING
Anesthesia Assessment: Preoperative EQUATION    Planned Procedure: Procedure(s) (LRB):  COLECTOMY, TOTAL, LAPAROSCOPIC - lap total (Left)  Insertion-Catheter (Left)  Requested Anesthesia Type:General  Surgeon: Papa Lopez MD  Service: Colon and Rectal  Known or anticipated Date of Surgery:9/5/2018    Surgeon notes: reviewed and Hx of adenomatous Colonic polyps  Previous anesthesia records:6/11/18-Colonoscopy-General-no apparent anesthetic complications and tolerated procedure well  Anesthesia Hx:  History of prior surgery of interest to airway management or planning: Personal Hx of Anesthesia complications  Difficult Intubation, glidescope used successfully   Last PCP note: 6-12 months ago , within [facility] , focused visit   Subspecialty notes: Gastroenterology  Tests already available:  Results have been reviewed.EKG-1/13/16/ Last A1c-9/26/17-5.7  Plan:    Testing:  Hematology Profile, CMP and T&S      Patient  has previously scheduled Medical Appointment:None    Navigation: Tests Scheduled. Labs-Hem Profile/CMP/T&S on ?             Consults scheduled.POC & Perioperative IM Consult on ?             Results will be tracked by Preop Clinic.                 Danielle Spence RN  8/8/18

## 2018-08-09 ENCOUNTER — TELEPHONE (OUTPATIENT)
Dept: SURGERY | Facility: CLINIC | Age: 58
End: 2018-08-09

## 2018-08-09 DIAGNOSIS — Z01.818 PREOP TESTING: Primary | ICD-10-CM

## 2018-08-09 NOTE — TELEPHONE ENCOUNTER
----- Message from Dima Gonzalez sent at 8/9/2018 12:40 PM CDT -----  Contact: [place] [facility]:[phone] [email or web address]  .Test Results    Type of Test:testing on hold for pt.  Date of Test:Unknown  Communication Preference:[place] [facility]anda:[phone] [email or web address]  Additional Information: Ref [id number]

## 2018-08-09 NOTE — TELEPHONE ENCOUNTER
Spoke with [place] and she asked if there was a history of polyps in the patient and I informed her that there were multiple.  She needed this to submit for insurance approval.

## 2018-08-24 ENCOUNTER — TELEPHONE (OUTPATIENT)
Dept: SURGERY | Facility: CLINIC | Age: 58
End: 2018-08-24

## 2018-08-24 NOTE — TELEPHONE ENCOUNTER
----- Message from Angelia Dowling sent at 8/24/2018 11:04 AM CDT -----  Contact: [facility] [phone]  Deb leon/ [facility] called to speak to Quyen Mehta in regarding the pt above labs     Contact: [facility] [phone]

## 2018-08-29 ENCOUNTER — INITIAL CONSULT (OUTPATIENT)
Dept: INTERNAL MEDICINE | Facility: CLINIC | Age: 58
End: 2018-08-29
Payer: COMMERCIAL

## 2018-08-29 ENCOUNTER — HOSPITAL ENCOUNTER (OUTPATIENT)
Dept: PREADMISSION TESTING | Facility: HOSPITAL | Age: 58
Discharge: HOME | End: 2018-08-29
Attending: ANESTHESIOLOGY
Payer: COMMERCIAL

## 2018-08-29 VITALS
TEMPERATURE: 98 F | WEIGHT: 212.69 LBS | RESPIRATION RATE: 18 BRPM | OXYGEN SATURATION: 97 % | SYSTOLIC BLOOD PRESSURE: 118 MMHG | HEIGHT: 70 IN | BODY MASS INDEX: 30.45 KG/M2 | HEART RATE: 69 BPM | DIASTOLIC BLOOD PRESSURE: 73 MMHG

## 2018-08-29 VITALS
BODY MASS INDEX: 30.45 KG/M2 | SYSTOLIC BLOOD PRESSURE: 118 MMHG | OXYGEN SATURATION: 97 % | TEMPERATURE: 98 F | HEIGHT: 70 IN | DIASTOLIC BLOOD PRESSURE: 73 MMHG | WEIGHT: 212.69 LBS | HEART RATE: 69 BPM

## 2018-08-29 DIAGNOSIS — E66.9 CLASS 1 OBESITY WITH BODY MASS INDEX (BMI) OF 30.0 TO 30.9 IN ADULT, UNSPECIFIED OBESITY TYPE, UNSPECIFIED WHETHER SERIOUS COMORBIDITY PRESENT: ICD-10-CM

## 2018-08-29 DIAGNOSIS — S32.009D CLOSED FRACTURE OF TRANSVERSE PROCESS OF LUMBAR VERTEBRA WITH ROUTINE HEALING, SUBSEQUENT ENCOUNTER: ICD-10-CM
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
## 2018-08-29 DIAGNOSIS — Z87.898 HISTORY OF PREDIABETES: ICD-10-CM

## 2018-08-29 DIAGNOSIS — Z01.818 PREOP EXAMINATION: Primary | ICD-10-CM

## 2018-08-29 DIAGNOSIS — I10 ESSENTIAL HYPERTENSION: ICD-10-CM

## 2018-08-29 DIAGNOSIS — K21.9 GASTROESOPHAGEAL REFLUX DISEASE, ESOPHAGITIS PRESENCE NOT SPECIFIED: ICD-10-CM

## 2018-08-29 DIAGNOSIS — R17 SERUM TOTAL BILIRUBIN ELEVATED: ICD-10-CM

## 2018-08-29 PROCEDURE — 99244 OFF/OP CNSLTJ NEW/EST MOD 40: CPT | Mod: S$GLB,,, | Performed by: HOSPITALIST

## 2018-08-29 PROCEDURE — 99999 PR PBB SHADOW E&M-EST. PATIENT-LVL II: CPT | Mod: PBBFAC,,, | Performed by: HOSPITALIST

## 2018-08-29 NOTE — ASSESSMENT & PLAN NOTE
GERD-  I suggest continuation of the Proton pump inhibitor in the perioperative period . I suggest aspiration precautions  Controlled , since alcohol reduction

## 2018-08-29 NOTE — ASSESSMENT & PLAN NOTE
Not known to have sleep apnea   Has a history of Prediabetes   encouraged weight loss   Alcohol reduction helped

## 2018-08-29 NOTE — HPI
History of present illness- I had the pleasure of meeting this pleasant 58 y.o. gentleman in the pre op clinic prior to his elective Abdominal surgery. The patient is new to me   I have obtained the history by speaking to the patient and by reviewing the electronic health records.    Events leading up to surgery / History of presenting illness -    Hx of adenomatous colonic polyps  Found  on routine colonoscopy   No pain from this .No aggravating factors. No relieving factors     Relevant health conditions of significance for the perioperative period/ History of presenting illness -    Patient Active Problem List    Diagnosis Date Noted                   History of prediabetes 11/08/2017    Class 1 obesity with body mass index (BMI) of 30.0 to 30.9 in adult 01/27/2016    Essential hypertension 01/27/2016     Lisinopril- HCTZ  Usual BP- 130/80                                         Lumbar transverse process fracture     Fall

## 2018-08-29 NOTE — OUTPATIENT SUBJECTIVE & OBJECTIVE
Outpatient Subjective & Objective     Chief complaint-Preoperative evaluation, Perioperative Medical management, complication reduction plan     Active cardiac conditions- none    Revised cardiac risk index predictors- none    Functional capacity -Examples of physical activity, physical work ,   can take 1 flight of stairs and cutting the grass with a mower----- He can undertake all the above activities without  chest pain,chest tightness, Shortness of breath ,dizziness,lightheadedness making his exercise tolerance more  than 4 Mets.       Review of Systems   Constitutional: Negative for chills and fever.   HENT:        STOPBANG score 4 / 8    HTN  Age over 50 years  Neck size over 40 CM  Male gender   Eyes:        No unusual vision changes   Respiratory:        No cough , phlegm  No Hemoptysis   Cardiovascular:        As noted  Occasional skipped beat - no passing out , no chest pain   Does not drink , coffee , Coke   Gastrointestinal:        Bowels- Regular   Occasional hemorrhoidal bleeding  No overt upper GI/ blood losses   Endocrine:        Prednisone use > 20 mg daily for 3 weeks- None   Genitourinary: Negative for dysuria.        No urinary hesitancy    Musculoskeletal:        Rt hand -  - weaker from the wrist injury   Skin: Negative for rash.   Neurological: Negative for syncope.        No unilateral weakness   Hematological:        Current use of Anticoagulants  Antiplatelet agents  None   Psychiatric/Behavioral:        No Depression,Anxiety       No vascular stenting     No past medical history pertinent negatives.    Past Surgical History:   Procedure Laterality Date    KNEE SURGERY Bilateral     states he has screws    KNEE SURGERY      3 reconstructive surgeries 20yrs go     WRIST SURGERY      2015       No  bleeding, cardiac problems, PONV with previous surgeries/procedures.  Medications and Allergies reviewed in epic.   FH- No anesthesia,bleeding / venous thrombosis ,in family   Has early  onset heart disease in family  Lives with wife , who can help post    Physical Exam     /73, pulse 69, temp 98.2, sat 97 %       Physical Exam  Constitutional- Vitals - There is no height or weight on file to calculate BMI., There were no vitals filed for this visit.  General appearance-Conscious,Coherent  Eyes- No conjunctival icterus,pupils  round  and reactive to light   ENT-Oral cavity- moist  , Hearing grossly normal   Neck- No thyromegaly ,Trachea -central, No jugular venous distension,   No Carotid Bruit   Cardiovascular -Heart Sounds- Normal  and  no murmur   , No gallop rhythm   Respiratory - Normal Respiratory Effort, Normal breath sounds,  no wheeze  and  no forced expiratory wheeze    Peripheral pitting pedal edema-- none , no calf pain   Gastrointestinal -Soft abdomen, No palpable masses, Non Tender,Liver,Spleen not palpable. No-- free fluid  Musculoskeletal- No finger Clubbing. Strength grossly normal   Lymphatic-No Palpable cervical, axillary,Inguinal lymphadenopathy   Psychiatric - normal effect,Orientation  Rt Dorsalis pedis pulses-palpable    Lt Dorsalis pedis pulses- palpable   Rt Posterior tibial pulses -palpable   Left posterior tibial pulses -palpable   Miscellaneous -  no renal bruit  Investigations  Lab and Imaging have been reviewed in [facility].    Review of Medicine tests    EKG- I had independently reviewed the EKG from--1/13/2016   It was reported to be showing     Normal sinus rhythm  Minimal voltage criteria for LVH, may be normal variant  Borderline Abnormal ECG  When compared with ECG of 21-APR-2015 19:49,  No significant change was found    Review of clinical lab tests:  Lab Results   Component Value Date    CREATININE 0.9 09/26/2017    HGB 15.5 09/26/2017     09/26/2017         Review of old records- Was done and information gathered regards to events leading to surgery and health conditions of significance in the perioperative period.    Outpatient Subjective & Objective

## 2018-08-29 NOTE — ASSESSMENT & PLAN NOTE
Prediabetes- I suggest monitoring the glucose in the perioperative period as  glucose may be high from stress hyperglycemia in which case Insulin may be required  Hemoglobin A1c in the pre diabetic range   Weight loss with diet and exercise , (if able) helps lower A1c  Increased risk of becoming a diabetic   suggested  follow up

## 2018-08-29 NOTE — DISCHARGE INSTRUCTIONS
Your surgery has been scheduled for:__________________________________________    You should report to:  ____[facility], located on the [place] side of the first floor of the           [facility] ([phone])  ____[facility], located on the [facility] side of the            Second floor of the [facility] ([phone])  ____3rd Floor [facility] located on the [facility] side of the [facility] [phone]  Please Note   - Tell your doctor if you take Aspirin, products containing Aspirin, herbal medications  or blood thinners, such as Coumadin, Ticlid, or Plavix.  (Consult your provider regarding holding or stopping before surgery).  - Arrange for someone to drive you home following surgery.  You will not be allowed to leave the surgical facility alone or drive yourself home following sedation and anesthesia.  Before Surgery  - Stop taking all herbal medications 14days prior to surgery  - No Motrin/Advil (Ibuprofen) 7 days before surgery  - No Aleve (Naproxen) 7 days before surgery  - Stop Taking Asprin, products containing Asprin _____days before surgery  - Stop taking blood thinners_______days before surgery  - No Goody's/BC  Powder 7 days before surgery  - Refrain from drinking alcoholic beverages for 24hours before and after surgery  - Stop or limit smoking _________days before surgery  - You may take Tylenol for pain  Night before Surgery  NOTHING TO EAT OR DRINK AFTER MIDNIGHT OR FOLLOW SURGEON'S INSTRUCTIONS  - Take a shower or bath (shower is recommended).  Bathe with Hibiclens soap or an antibacterial soap from the neck down.  If not supplied by your surgeon, hibiclens soap will need to be purchased over the counter in pharmacy.  Rinse soap off thoroughly.  - Shampoo your hair with your regular shampoo  The Day of Surgery  · If you are told to take medication on the morning of surgery, it may be taken with a  sip of water.   - Take another bath or shower with hibiclens or any antibacterial soap, to reduce the chance of infection.  - Take heart and blood pressure medications with a small sip of water, as advised by the perioperative team.  - Do not take fluid pills  - You may brush your teeth and rinse your mouth, but do not swall any additional water.   - Do not apply perfumes, powder, body lotions or deodorant on the day of surgery.  - Nail polish should be removed.  - Do not wear makeup or moisturizer  - Wear comfortable clothes, such as a button front shirt and loose fitting pants.  - Leave all jewelry, including body piercings, and valuables at home.    - Bring any devices you will neeed after surgery such as crutches or canes.  - If you have sleep apnea, please bring your CPAP machine  In the event that your physical condition changes including the onset of a cold or respiratory illness, or if you have to delay or cancel your surgery, please notify your surgeon.  Anesthesia: General Anesthesia  Youre due to have surgery. During surgery, youll be given medication called anesthesia. (It is also called anesthetic.) This will keep you comfortable and pain-free. Your anesthesia provider will use general anesthesia. This sheet tells you more about it.  What is general anesthesia?     You are watched continuously during your procedure by the anesthesia provider   General anesthesia puts you into a state like deep sleep. It goes into the bloodstream (IV anesthetics), into the lungs (gas anesthetics), or both. You feel nothing during the procedure. You will not remember it. During the procedure, the anesthesia provider monitors you continuously. He or she checks your heart rate and rhythm, blood pressure, breathing, and blood oxygen.  · IV Anesthetics. IV anesthetics are given through an IV line in your arm. Theyre often given first. This is so you are asleep before a gas anesthetic is started. Some kinds of IV  anesthetics relieve pain. Others relax you. Your doctor will decide which kind is best in your case.  · Gas Anesthetics. Gas anesthetics are breathed into the lungs. They are often used to keep you asleep. They can be given through a facemask or a tube placed in your larynx or trachea (breathing tube).  ? If you have a facemask, your anesthesia provider will most likely place it over your nose and mouth while youre still awake. Youll breathe oxygen through the mask as your IV anesthetic is started. Gas anesthetic may be added through the mask.  ? If you have a tube in the larynx or trachea, it will be inserted into your throat after youre asleep.  Anesthesia tools and medications  You will likely have:  · IV anesthetics. These are put into an IV line into your bloodstream.  · Gas anesthetics. You breathe these anesthetics into your lungs, where they pass into your bloodstream.  · Pulse oximeter. This is a small clip that is attached to the end of your finger. This measures your blood oxygen level.  · Electrocardiography leads (electrodes). These are small sticky pads that are placed on your chest. They record your heart rate and rhythm.  · Blood pressure cuff. This reads your blood pressure.  Risks and possible complications  General anesthesia has some risks. These include:  · Breathing problems  · Nausea and vomiting  · Sore throat or hoarseness (usually temporary)  · Allergic reaction to the anesthetic  · Irregular heartbeat (rare)  · Cardiac arrest (rare)   Anesthesia safety  · Follow all instructions you are given for how long not to eat or drink before your procedure.  · Be sure your doctor knows what medications and drugs you take. This includes over-the-counter medications, herbs, supplements, alcohol or other drugs. You will be asked when those were last taken.  · Have an adult family member or friend drive you home after the procedure.  · For the first 24 hours after your surgery:  ? Do not drive or use  heavy equipment.  ? Have a trusted family member or spouse make important decisions or sign documents.  ? Avoid alcohol.  ? Have a responsible adult stay with you. He or she can watch for problems and help keep you safe.  Date Last Reviewed: 10/16/2014  © 5512-3450 [employer]. [address] [place] [address]. All rights reserved. This information is not intended as a substitute for professional medical care. Always follow your healthcare professional's instructions.

## 2018-08-29 NOTE — PROGRESS NOTES
[facility] - Pre Op Consult  Progress Note    Patient Name: Jenni Prescott Jr.  MRN: [id number]  Date of Evaluation- 09/04/2018  PCP- Radha Antoine MD    Future cases for Jenni Prescott Jr. [[id number]]     Case ID Status Date Time Tom Procedure Provider Location    [id number] [facility] 9/5/2018  8:00  COLECTOMY, TOTAL, LAPAROSCOPIC - lap total Papa Lopez MD [[id number]] [facility] OR 2ND FLR          HPI:  History of present illness- I had the pleasure of meeting this pleasant 58 y.o. gentleman in the pre op clinic prior to his elective Abdominal surgery. The patient is new to me   I have obtained the history by speaking to the patient and by reviewing the electronic health records.    Events leading up to surgery / History of presenting illness -    Hx of adenomatous colonic polyps  Found  on routine colonoscopy   No pain from this .No aggravating factors. No relieving factors     Relevant health conditions of significance for the perioperative period/ History of presenting illness -    Patient Active Problem List    Diagnosis Date Noted                   History of prediabetes 11/08/2017    Class 1 obesity with body mass index (BMI) of 30.0 to 30.9 in adult 01/27/2016    Essential hypertension 01/27/2016     Lisinopril- HCTZ  Usual BP- 130/80                                         Lumbar transverse process fracture     Fall          Subjective/ Objective:          Chief complaint-Preoperative evaluation, Perioperative Medical management, complication reduction plan     Active cardiac conditions- none    Revised cardiac risk index predictors- none    Functional capacity -Examples of physical activity, physical work ,   can take 1 flight of stairs and cutting the grass with a mower----- He can undertake all the above activities without  chest pain,chest tightness, Shortness of breath ,dizziness,lightheadedness making his exercise tolerance more  than 4 Mets.       Review of Systems    Constitutional: Negative for chills and fever.   HENT:        STOPBANG score 4 / 8    HTN  Age over 50 years  Neck size over 40 CM  Male gender   Eyes:        No unusual vision changes   Respiratory:        No cough , phlegm  No Hemoptysis   Cardiovascular:        As noted  Occasional skipped beat - no passing out , no chest pain   Does not drink , coffee , Coke   Gastrointestinal:        Bowels- Regular   Occasional hemorrhoidal bleeding  No overt upper GI/ blood losses   Endocrine:        Prednisone use > 20 mg daily for 3 weeks- None   Genitourinary: Negative for dysuria.        No urinary hesitancy    Musculoskeletal:        Rt hand -  - weaker from the wrist injury   Skin: Negative for rash.   Neurological: Negative for syncope.        No unilateral weakness   Hematological:        Current use of Anticoagulants  Antiplatelet agents  None   Psychiatric/Behavioral:        No Depression,Anxiety       No vascular stenting     No past medical history pertinent negatives.    Past Surgical History:   Procedure Laterality Date    KNEE SURGERY Bilateral     states he has screws    KNEE SURGERY      3 reconstructive surgeries 20yrs go     WRIST SURGERY      2015       No  bleeding, cardiac problems, PONV with previous surgeries/procedures.  Medications and Allergies reviewed in epic.   FH- No anesthesia,bleeding / venous thrombosis ,in family   Has early onset heart disease in family  Lives with wife , who can help post    Physical Exam     /73, pulse 69, temp 98.2, sat 97 %       Physical Exam  Constitutional- Vitals - There is no height or weight on file to calculate BMI., There were no vitals filed for this visit.  General appearance-Conscious,Coherent  Eyes- No conjunctival icterus,pupils  round  and reactive to light   ENT-Oral cavity- moist  , Hearing grossly normal   Neck- No thyromegaly ,Trachea -central, No jugular venous distension,   No Carotid Bruit   Cardiovascular -Heart Sounds- Normal  and   no murmur   , No gallop rhythm   Respiratory - Normal Respiratory Effort, Normal breath sounds,  no wheeze  and  no forced expiratory wheeze    Peripheral pitting pedal edema-- none , no calf pain   Gastrointestinal -Soft abdomen, No palpable masses, Non Tender,Liver,Spleen not palpable. No-- free fluid  Musculoskeletal- No finger Clubbing. Strength grossly normal   Lymphatic-No Palpable cervical, axillary,Inguinal lymphadenopathy   Psychiatric - normal effect,Orientation  Rt Dorsalis pedis pulses-palpable    Lt Dorsalis pedis pulses- palpable   Rt Posterior tibial pulses -palpable   Left posterior tibial pulses -palpable   Miscellaneous -  no renal bruit  Investigations  Lab and Imaging have been reviewed in [facility].    Review of Medicine tests    EKG- I had independently reviewed the EKG from--1/13/2016   It was reported to be showing     Normal sinus rhythm  Minimal voltage criteria for LVH, may be normal variant  Borderline Abnormal ECG  When compared with ECG of 21-APR-2015 19:49,  No significant change was found    Review of clinical lab tests:  Lab Results   Component Value Date    CREATININE 0.9 09/26/2017    HGB 15.5 09/26/2017     09/26/2017         Review of old records- Was done and information gathered regards to events leading to surgery and health conditions of significance in the perioperative period.        Preoperative cardiac risk assessment-  The patient does not have any active cardiac conditions . Revised cardiac risk index predictors- 0---.Functional capacity is more than 4 Mets. He will be undergoing a Abdominal procedure that carries a intermediate risk     The estimated risk of the rate of adverse cardiac outcomes  0.4%    No further cardiac work up is indicated prior to proceeding with the surgery     Orders Placed This Encounter    US Abdomen Complete    Hemoglobin A1c       American Society of Anesthesiologists Physical status classification ( ASA ) class: 3     Postoperative  pulmonary complication risk assessment:      ARISCAT ( Canet) risk index- risk class -  Low, if duration of surgery is under 3 hours, intermediate, if duration of surgery is over 3 hours      Tien Respiratory failure index- percentage risk of respiratory failure: 0.5 %     Assessment/Plan:     Lumbar transverse process fracture  Healed with conservative measures      Essential hypertension  Hypertension-  Blood pressure is acceptable .I suggest holding Lisinopril- HCTZ on the morning of the surgery and can continue that  post operatively under blood pressure, electrolyte and renal function monitoring as long as they are acceptable.I suggest addressing pain control as uncontrolled pain can increased blood pressure     Class 1 obesity with body mass index (BMI) of 30.0 to 30.9 in adult  Not known to have sleep apnea   Has a history of Prediabetes   encouraged weight loss   Alcohol reduction helped     History of prediabetes  Prediabetes- I suggest monitoring the glucose in the perioperative period as  glucose may be high from stress hyperglycemia in which case Insulin may be required  Hemoglobin A1c in the pre diabetic range   Weight loss with diet and exercise , (if able) helps lower A1c  Increased risk of becoming a diabetic   suggested  follow up       Fall  From the ladder in 2015 and sustained multiple injuries   Healed up     Acid reflux  GERD-  I suggest continuation of the Proton pump inhibitor in the perioperative period . I suggest aspiration precautions  Controlled , since alcohol reduction    Serum total bilirubin elevated  Discussed , suggested follow up   No suggestion of liver decompensation   Ordered Ultra sound abdomen         Preventive perioperative care    Thromboembolic prophylaxis:  His risk factors for thrombosis include obesity, surgical procedure and age.I suggest  thromboembolic prophylaxis ( mechanical/pharmacological, weighing the risk benefits of pharmacological agent use considering  scott procedural bleeding )  during the perioperative period.I suggested being active in the post operative period.      Postoperative pulmonary complication prophylaxis-Risk factors for post operative pulmonary complications include surgery lasting over 3 hours, ASA class >2 and proximity of the surgical site to the lungs- I suggest incentive spirometry use, early ambulation, end tidal carbon dioxide monitoring and pain control so as to avoid diaphragmatic splinting  , oral care , head end of bed elevation      Renal complication prophylaxis-Risk factors for renal complications include hypertension . I suggest keeping him well hydrated .I suggested drinking 2 litre's of water a day      Surgical site Infection Prophylaxis-I  suggest appropriate antibiotic for Prophylaxis against Surgical site infections     This visit was focused on Preoperative evaluation, Perioperative Medical management, complication reduction plans. I suggest that the patient follows up with primary care or relevant sub specialists for ongoing health care.    I appreciate the opportunity to be involved in this patients care. Please feel free to contact me if there were any questions about this consultation.    Patient is optimized       Patient  was instructed to call and update me about any changes to health,  medication, office visits ,testing out side of the [facility] , hospitalizations between now and surgery     Cande Mosley MD  Perioperative Medicine  [facility]   Pager [phone]  ------  8/30- 7 25     Hb, HCT,PLT-N   CMP-Bilirubin elevated -elevation  noted in the past also   Alk phos - Normal   Direct Bilirubin elevated in 2015   Called to discuss the LFT  Left a message   Offered US abdomen   Would prefer  not to delay surgery though   Left a message to call the office   Order placed for US abdomen   -----  9/2- 4 38     Hb A1c in the normal range at 5.5   No longer has prediabetes   ----  9/4-  7 39     Called to follow up   Spoke to him ,Doing well ,No changes to Medication, Health   Spoke to him - about US- 4 weeks from now

## 2018-08-29 NOTE — LETTER
August 29, 2018      Papa Lopez MD  [address] [facility] [address]           [facility]  [address] [facility] [address]  Phone: [phone]          Patient: Jenni Prescott Jr.   MR Number: [id number]   YOB: 1960   Date of Visit: 8/29/2018       Dear Dr. Carmen Samuel:    Thank you for referring Jenni Prescott to me for evaluation. Attached you will find relevant portions of my assessment and plan of care.    If you have questions, please do not hesitate to call me. I look forward to following Jenni Prescott along with you.    Sincerely,    Cande Mosley MD    Enclosure  CC:  MD Radha Larson MD    If you would like to receive this communication electronically, please contact [email or web address] or [phone] to request more information on [employer] access.    For providers and/or their staff who would like to refer a patient to [facility], please contact us through our one-stop-shop provider referral line, [facility], at [phone].    If you feel you have received this communication in error or would no longer like to receive these types of communications, please e-mail [email or web address]

## 2018-08-29 NOTE — H&P (VIEW-ONLY)
[facility] - Pre Op Consult  Progress Note    Patient Name: Jenni Prescott Jr.  MRN: [id number]  Date of Evaluation- 09/04/2018  PCP- Radha Antoine MD    Future cases for Jenni Prescott Jr. [[id number]]     Case ID Status Date Time Tom Procedure Provider Location    [id number] [facility] 9/5/2018  8:00  COLECTOMY, TOTAL, LAPAROSCOPIC - lap total Papa Lopez MD [[id number]] [facility] OR 2ND FLR          HPI:  History of present illness- I had the pleasure of meeting this pleasant 58 y.o. gentleman in the pre op clinic prior to his elective Abdominal surgery. The patient is new to me   I have obtained the history by speaking to the patient and by reviewing the electronic health records.    Events leading up to surgery / History of presenting illness -    Hx of adenomatous colonic polyps  Found  on routine colonoscopy   No pain from this .No aggravating factors. No relieving factors     Relevant health conditions of significance for the perioperative period/ History of presenting illness -    Patient Active Problem List    Diagnosis Date Noted                   History of prediabetes 11/08/2017    Class 1 obesity with body mass index (BMI) of 30.0 to 30.9 in adult 01/27/2016    Essential hypertension 01/27/2016     Lisinopril- HCTZ  Usual BP- 130/80                                         Lumbar transverse process fracture     Fall          Subjective/ Objective:          Chief complaint-Preoperative evaluation, Perioperative Medical management, complication reduction plan     Active cardiac conditions- none    Revised cardiac risk index predictors- none    Functional capacity -Examples of physical activity, physical work ,   can take 1 flight of stairs and cutting the grass with a mower----- He can undertake all the above activities without  chest pain,chest tightness, Shortness of breath ,dizziness,lightheadedness making his exercise tolerance more  than 4 Mets.       Review of Systems    Constitutional: Negative for chills and fever.   HENT:        STOPBANG score 4 / 8    HTN  Age over 50 years  Neck size over 40 CM  Male gender   Eyes:        No unusual vision changes   Respiratory:        No cough , phlegm  No Hemoptysis   Cardiovascular:        As noted  Occasional skipped beat - no passing out , no chest pain   Does not drink , coffee , Coke   Gastrointestinal:        Bowels- Regular   Occasional hemorrhoidal bleeding  No overt upper GI/ blood losses   Endocrine:        Prednisone use > 20 mg daily for 3 weeks- None   Genitourinary: Negative for dysuria.        No urinary hesitancy    Musculoskeletal:        Rt hand -  - weaker from the wrist injury   Skin: Negative for rash.   Neurological: Negative for syncope.        No unilateral weakness   Hematological:        Current use of Anticoagulants  Antiplatelet agents  None   Psychiatric/Behavioral:        No Depression,Anxiety       No vascular stenting     No past medical history pertinent negatives.    Past Surgical History:   Procedure Laterality Date    KNEE SURGERY Bilateral     states he has screws    KNEE SURGERY      3 reconstructive surgeries 20yrs go     WRIST SURGERY      2015       No  bleeding, cardiac problems, PONV with previous surgeries/procedures.  Medications and Allergies reviewed in epic.   FH- No anesthesia,bleeding / venous thrombosis ,in family   Has early onset heart disease in family  Lives with wife , who can help post    Physical Exam     /73, pulse 69, temp 98.2, sat 97 %       Physical Exam  Constitutional- Vitals - There is no height or weight on file to calculate BMI., There were no vitals filed for this visit.  General appearance-Conscious,Coherent  Eyes- No conjunctival icterus,pupils  round  and reactive to light   ENT-Oral cavity- moist  , Hearing grossly normal   Neck- No thyromegaly ,Trachea -central, No jugular venous distension,   No Carotid Bruit   Cardiovascular -Heart Sounds- Normal  and   no murmur   , No gallop rhythm   Respiratory - Normal Respiratory Effort, Normal breath sounds,  no wheeze  and  no forced expiratory wheeze    Peripheral pitting pedal edema-- none , no calf pain   Gastrointestinal -Soft abdomen, No palpable masses, Non Tender,Liver,Spleen not palpable. No-- free fluid  Musculoskeletal- No finger Clubbing. Strength grossly normal   Lymphatic-No Palpable cervical, axillary,Inguinal lymphadenopathy   Psychiatric - normal effect,Orientation  Rt Dorsalis pedis pulses-palpable    Lt Dorsalis pedis pulses- palpable   Rt Posterior tibial pulses -palpable   Left posterior tibial pulses -palpable   Miscellaneous -  no renal bruit  Investigations  Lab and Imaging have been reviewed in [facility].    Review of Medicine tests    EKG- I had independently reviewed the EKG from--1/13/2016   It was reported to be showing     Normal sinus rhythm  Minimal voltage criteria for LVH, may be normal variant  Borderline Abnormal ECG  When compared with ECG of 21-APR-2015 19:49,  No significant change was found    Review of clinical lab tests:  Lab Results   Component Value Date    CREATININE 0.9 09/26/2017    HGB 15.5 09/26/2017     09/26/2017         Review of old records- Was done and information gathered regards to events leading to surgery and health conditions of significance in the perioperative period.        Preoperative cardiac risk assessment-  The patient does not have any active cardiac conditions . Revised cardiac risk index predictors- 0---.Functional capacity is more than 4 Mets. He will be undergoing a Abdominal procedure that carries a intermediate risk     The estimated risk of the rate of adverse cardiac outcomes  0.4%    No further cardiac work up is indicated prior to proceeding with the surgery     Orders Placed This Encounter    US Abdomen Complete    Hemoglobin A1c       American Society of Anesthesiologists Physical status classification ( ASA ) class: 3     Postoperative  pulmonary complication risk assessment:      ARISCAT ( Canet) risk index- risk class -  Low, if duration of surgery is under 3 hours, intermediate, if duration of surgery is over 3 hours      Tien Respiratory failure index- percentage risk of respiratory failure: 0.5 %     Assessment/Plan:     Lumbar transverse process fracture  Healed with conservative measures      Essential hypertension  Hypertension-  Blood pressure is acceptable .I suggest holding Lisinopril- HCTZ on the morning of the surgery and can continue that  post operatively under blood pressure, electrolyte and renal function monitoring as long as they are acceptable.I suggest addressing pain control as uncontrolled pain can increased blood pressure     Class 1 obesity with body mass index (BMI) of 30.0 to 30.9 in adult  Not known to have sleep apnea   Has a history of Prediabetes   encouraged weight loss   Alcohol reduction helped     History of prediabetes  Prediabetes- I suggest monitoring the glucose in the perioperative period as  glucose may be high from stress hyperglycemia in which case Insulin may be required  Hemoglobin A1c in the pre diabetic range   Weight loss with diet and exercise , (if able) helps lower A1c  Increased risk of becoming a diabetic   suggested  follow up       Fall  From the ladder in 2015 and sustained multiple injuries   Healed up     Acid reflux  GERD-  I suggest continuation of the Proton pump inhibitor in the perioperative period . I suggest aspiration precautions  Controlled , since alcohol reduction    Serum total bilirubin elevated  Discussed , suggested follow up   No suggestion of liver decompensation   Ordered Ultra sound abdomen         Preventive perioperative care    Thromboembolic prophylaxis:  His risk factors for thrombosis include obesity, surgical procedure and age.I suggest  thromboembolic prophylaxis ( mechanical/pharmacological, weighing the risk benefits of pharmacological agent use considering  [facility] procedural bleeding )  during the perioperative period.I suggested being active in the post operative period.      Postoperative pulmonary complication prophylaxis-Risk factors for post operative pulmonary complications include surgery lasting over 3 hours, ASA class >2 and proximity of the surgical site to the lungs- I suggest incentive spirometry use, early ambulation, end tidal carbon dioxide monitoring and pain control so as to avoid diaphragmatic splinting  , oral care , head end of bed elevation      Renal complication prophylaxis-Risk factors for renal complications include hypertension . I suggest keeping him well hydrated .I suggested drinking 2 litre's of water a day      Surgical site Infection Prophylaxis-I  suggest appropriate antibiotic for Prophylaxis against Surgical site infections     This visit was focused on Preoperative evaluation, Perioperative Medical management, complication reduction plans. I suggest that the patient follows up with primary care or relevant sub specialists for ongoing health care.    I appreciate the opportunity to be involved in this patients care. Please feel free to contact me if there were any questions about this consultation.    Patient is optimized       Patient  was instructed to call and update me about any changes to health,  medication, office visits ,testing out side of the [facility] , hospitalizations between now and surgery     Cande Mosley MD  Perioperative Medicine  [facility]   Pager [phone]  ------  8/30- 7 25     Hb, HCT,PLT-N   CMP-Bilirubin elevated -elevation  noted in the past also   Alk phos - Normal   Direct Bilirubin elevated in 2015   Called to discuss the LFT  Left a message   Offered US abdomen   Would prefer  not to delay surgery though   Left a message to call the office   Order placed for US abdomen   -----  9/2- 4 38     Hb A1c in the normal range at 5.5   No longer has prediabetes   ----  9/4-  7 39     Called to follow up   Spoke to him ,Doing well ,No changes to Medication, Health   Spoke to him - about US- 4 weeks from now

## 2018-08-29 NOTE — ASSESSMENT & PLAN NOTE
Discussed , suggested follow up   No suggestion of liver decompensation   Ordered Ultra sound abdomen

## 2018-08-29 NOTE — PATIENT INSTRUCTIONS
Verified PreOp Instructions given:    -- Medication information (what to hold and what to take)   -- NPO guidelines as follows: (or as per your Surgeon)  1. Stop ALL solid food, gum, candy (including vitamins) 8 hours before surgery/procedure time.  2. Stop all CLOUDY liquids: coffee with creamer, cloudy juices, 6 hours prior to surgery/procedure time.  3. The patient should be ENCOURAGED to drink carbohydrate-rich clear liquids (sports drinks, clear juices) until 2 hours prior to surgery/procedure time.  4. CLEAR liquids include only water, black coffee NO creamer, clear oral rehydration drinks, clear sports drinks or clear fruit juices (no orange juice, no pulpy juices, no apple cider).   5. IF IN DOUBT, drink water instead.   6. NOTHING TO DRINK 2 hours before to surgery/procedure time. If you are told to take medication on the morning of surgery, it may be taken with a sip of water.   -- Arrival place and directions given; time to be given the day before procedure by the Surgeon's Office   -- Bathing with antibacterial soap   -- Don't wear any jewelry or bring any valuables AM of surgery   -- No makeup or moisturizer to face   -- No perfume/cologne, powder, lotions or aftershave     Pt verbalized understanding.

## 2018-08-29 NOTE — ASSESSMENT & PLAN NOTE
Hypertension-  Blood pressure is acceptable .I suggest holding Lisinopril- HCTZ on the morning of the surgery and can continue that  post operatively under blood pressure, electrolyte and renal function monitoring as long as they are acceptable.I suggest addressing pain control as uncontrolled pain can increased blood pressure

## 2018-08-31 ENCOUNTER — TELEPHONE (OUTPATIENT)
Dept: SURGERY | Facility: CLINIC | Age: 58
End: 2018-08-31

## 2018-08-31 RX ORDER — NEOMYCIN SULFATE 500 MG/1
500 TABLET ORAL SEE ADMIN INSTRUCTIONS
Qty: 6 TABLET | Refills: 0 | Status: ON HOLD | OUTPATIENT
Start: 2018-08-31 | End: 2018-09-08 | Stop reason: SDUPTHER

## 2018-08-31 RX ORDER — METRONIDAZOLE 500 MG/1
500 TABLET ORAL SEE ADMIN INSTRUCTIONS
Qty: 3 TABLET | Refills: 0 | Status: ON HOLD | OUTPATIENT
Start: 2018-08-31 | End: 2018-09-08 | Stop reason: SDUPTHER

## 2018-08-31 NOTE — TELEPHONE ENCOUNTER
----- Message from Dima Gonzalez sent at 8/31/2018  9:30 AM CDT -----  Contact: Pt wife Liset:[phone]  .Needs Advice    Reason for call:Pt wife called and states she would like to speak with the nurse in regards to getting his prep medication sent to the pharmacy before the weekend. Pt wife would also like know if the medication Hibiclens is found over the counter of if it is a Rx. Pt wife also states she would like to know if the pt can take his vitamins.     Communication Preference:Pt wife Liset:[phone]  Additional Information:

## 2018-08-31 NOTE — TELEPHONE ENCOUNTER
Spoke with Liset patient's wife.  Informed her that the only vitamins that he can't take are Vitamin E and Fish Oil.  Preop meds are being called in to [facility] in [place].  Hibiclens is an OTC antibacterial soap.

## 2018-09-05 ENCOUNTER — ANESTHESIA (OUTPATIENT)
Dept: SURGERY | Facility: HOSPITAL | Age: 58
DRG: 331 | End: 2018-09-05
Payer: COMMERCIAL

## 2018-09-05 ENCOUNTER — HOSPITAL ENCOUNTER (INPATIENT)
Facility: HOSPITAL | Age: 58
LOS: 3 days | Discharge: HOME | DRG: 331 | End: 2018-09-08
Attending: COLON & RECTAL SURGERY | Admitting: COLON & RECTAL SURGERY
Payer: COMMERCIAL

## 2018-09-05 DIAGNOSIS — D36.9 ADENOMATOUS POLYP: Primary | ICD-10-CM

## 2018-09-05 PROCEDURE — C9290 INJ, BUPIVACAINE LIPOSOME: HCPCS | Performed by: COLON & RECTAL SURGERY

## 2018-09-05 PROCEDURE — 25000003 PHARM REV CODE 250: Performed by: STUDENT IN AN ORGANIZED HEALTH CARE EDUCATION/TRAINING PROGRAM

## 2018-09-05 PROCEDURE — 27201423 OPTIME MED/SURG SUP & DEVICES STERILE SUPPLY: Performed by: COLON & RECTAL SURGERY

## 2018-09-05 PROCEDURE — S0030 INJECTION, METRONIDAZOLE: HCPCS | Performed by: NURSE PRACTITIONER

## 2018-09-05 PROCEDURE — S0030 INJECTION, METRONIDAZOLE: HCPCS | Performed by: STUDENT IN AN ORGANIZED HEALTH CARE EDUCATION/TRAINING PROGRAM

## 2018-09-05 PROCEDURE — 88302 TISSUE EXAM BY PATHOLOGIST: CPT | Performed by: PATHOLOGY

## 2018-09-05 PROCEDURE — 44210 LAPARO TOTAL PROCTOCOLECTOMY: CPT | Mod: ,,, | Performed by: COLON & RECTAL SURGERY

## 2018-09-05 PROCEDURE — 36000711: Performed by: COLON & RECTAL SURGERY

## 2018-09-05 PROCEDURE — 94761 N-INVAS EAR/PLS OXIMETRY MLT: CPT

## 2018-09-05 PROCEDURE — 25000003 PHARM REV CODE 250: Performed by: NURSE PRACTITIONER

## 2018-09-05 PROCEDURE — 37000008 HC ANESTHESIA 1ST 15 MINUTES: Performed by: COLON & RECTAL SURGERY

## 2018-09-05 PROCEDURE — 36000710: Performed by: COLON & RECTAL SURGERY

## 2018-09-05 PROCEDURE — 37000009 HC ANESTHESIA EA ADD 15 MINS: Performed by: COLON & RECTAL SURGERY

## 2018-09-05 PROCEDURE — D9220A PRA ANESTHESIA: Mod: ,,, | Performed by: ANESTHESIOLOGY

## 2018-09-05 PROCEDURE — 88302 TISSUE EXAM BY PATHOLOGIST: CPT | Mod: 26,,, | Performed by: PATHOLOGY

## 2018-09-05 PROCEDURE — 71000033 HC RECOVERY, INTIAL HOUR: Performed by: COLON & RECTAL SURGERY

## 2018-09-05 PROCEDURE — 63600175 PHARM REV CODE 636 W HCPCS: Performed by: STUDENT IN AN ORGANIZED HEALTH CARE EDUCATION/TRAINING PROGRAM

## 2018-09-05 PROCEDURE — 27100025 HC TUBING, SET FLUID WARMER: Performed by: STUDENT IN AN ORGANIZED HEALTH CARE EDUCATION/TRAINING PROGRAM

## 2018-09-05 PROCEDURE — 25000003 PHARM REV CODE 250: Performed by: COLON & RECTAL SURGERY

## 2018-09-05 PROCEDURE — 20600001 HC STEP DOWN PRIVATE ROOM

## 2018-09-05 PROCEDURE — 88309 TISSUE EXAM BY PATHOLOGIST: CPT | Mod: 26,,, | Performed by: PATHOLOGY

## 2018-09-05 PROCEDURE — C1769 GUIDE WIRE: HCPCS | Performed by: COLON & RECTAL SURGERY

## 2018-09-05 PROCEDURE — 0DTE0ZZ RESECTION OF LARGE INTESTINE, OPEN APPROACH: ICD-10-PCS | Performed by: COLON & RECTAL SURGERY

## 2018-09-05 PROCEDURE — 63600175 PHARM REV CODE 636 W HCPCS: Performed by: NURSE PRACTITIONER

## 2018-09-05 PROCEDURE — 52005 CYSTO W/URTRL CATHJ: CPT | Mod: ,,, | Performed by: UROLOGY

## 2018-09-05 PROCEDURE — 63600175 PHARM REV CODE 636 W HCPCS: Performed by: COLON & RECTAL SURGERY

## 2018-09-05 PROCEDURE — 0T778DZ DILATION OF LEFT URETER WITH INTRALUMINAL DEVICE, VIA NATURAL OR ARTIFICIAL OPENING ENDOSCOPIC: ICD-10-PCS | Performed by: UROLOGY

## 2018-09-05 PROCEDURE — 63600175 PHARM REV CODE 636 W HCPCS: Performed by: ANESTHESIOLOGY

## 2018-09-05 PROCEDURE — 71000039 HC RECOVERY, EACH ADD'L HOUR: Performed by: COLON & RECTAL SURGERY

## 2018-09-05 RX ORDER — GABAPENTIN 300 MG/1
300 CAPSULE ORAL
Status: DISCONTINUED | OUTPATIENT
Start: 2018-09-05 | End: 2024-03-26

## 2018-09-05 RX ORDER — PHENYLEPHRINE HYDROCHLORIDE 10 MG/ML
INJECTION INTRAVENOUS
Status: DISCONTINUED | OUTPATIENT
Start: 2018-09-05 | End: 2018-09-05

## 2018-09-05 RX ORDER — ONDANSETRON 2 MG/ML
INJECTION INTRAMUSCULAR; INTRAVENOUS
Status: COMPLETED
Start: 2018-09-05 | End: 2018-09-05

## 2018-09-05 RX ORDER — IBUPROFEN 200 MG
16 TABLET ORAL
Status: DISCONTINUED | OUTPATIENT
Start: 2018-09-05 | End: 2024-03-26

## 2018-09-05 RX ORDER — SODIUM CHLORIDE 9 MG/ML
INJECTION, SOLUTION INTRAVENOUS CONTINUOUS PRN
Status: DISCONTINUED | OUTPATIENT
Start: 2018-09-05 | End: 2018-09-05

## 2018-09-05 RX ORDER — ALVIMOPAN 12 MG/1
12 CAPSULE ORAL ONCE
Status: COMPLETED | OUTPATIENT
Start: 2018-09-05 | End: 2018-09-05

## 2018-09-05 RX ORDER — SODIUM CHLORIDE 0.9 % (FLUSH) 0.9 %
3 SYRINGE (ML) INJECTION
Status: DISCONTINUED | OUTPATIENT
Start: 2018-09-05 | End: 2018-09-05 | Stop reason: HOSPADM

## 2018-09-05 RX ORDER — ENOXAPARIN SODIUM 100 MG/ML
40 INJECTION SUBCUTANEOUS
Status: DISCONTINUED | OUTPATIENT
Start: 2018-09-05 | End: 2024-03-26

## 2018-09-05 RX ORDER — DEXAMETHASONE SODIUM PHOSPHATE 4 MG/ML
INJECTION, SOLUTION INTRA-ARTICULAR; INTRALESIONAL; INTRAMUSCULAR; INTRAVENOUS; SOFT TISSUE
Status: DISCONTINUED | OUTPATIENT
Start: 2018-09-05 | End: 2018-09-05

## 2018-09-05 RX ORDER — SUCCINYLCHOLINE CHLORIDE 20 MG/ML
INJECTION INTRAMUSCULAR; INTRAVENOUS
Status: DISCONTINUED | OUTPATIENT
Start: 2018-09-05 | End: 2018-09-05

## 2018-09-05 RX ORDER — FENTANYL CITRATE 50 UG/ML
INJECTION, SOLUTION INTRAMUSCULAR; INTRAVENOUS
Status: DISCONTINUED | OUTPATIENT
Start: 2018-09-05 | End: 2018-09-05

## 2018-09-05 RX ORDER — SODIUM CHLORIDE 9 MG/ML
INJECTION, SOLUTION INTRAVENOUS CONTINUOUS
Status: DISCONTINUED | OUTPATIENT
Start: 2018-09-05 | End: 2018-09-06

## 2018-09-05 RX ORDER — IBUPROFEN 200 MG
24 TABLET ORAL
Status: DISCONTINUED | OUTPATIENT
Start: 2018-09-05 | End: 2024-03-26

## 2018-09-05 RX ORDER — KETAMINE HYDROCHLORIDE 10 MG/ML
INJECTION, SOLUTION INTRAMUSCULAR; INTRAVENOUS
Status: DISCONTINUED | OUTPATIENT
Start: 2018-09-05 | End: 2018-09-05

## 2018-09-05 RX ORDER — ONDANSETRON 2 MG/ML
4 INJECTION INTRAMUSCULAR; INTRAVENOUS ONCE
Status: DISCONTINUED | OUTPATIENT
Start: 2018-09-05 | End: 2018-09-05 | Stop reason: HOSPADM

## 2018-09-05 RX ORDER — GLYCOPYRROLATE 0.2 MG/ML
INJECTION INTRAMUSCULAR; INTRAVENOUS
Status: DISCONTINUED | OUTPATIENT
Start: 2018-09-05 | End: 2018-09-05

## 2018-09-05 RX ORDER — HYDROMORPHONE HYDROCHLORIDE 1 MG/ML
0.2 INJECTION, SOLUTION INTRAMUSCULAR; INTRAVENOUS; SUBCUTANEOUS EVERY 5 MIN PRN
Status: DISCONTINUED | OUTPATIENT
Start: 2018-09-05 | End: 2018-09-05 | Stop reason: HOSPADM

## 2018-09-05 RX ORDER — METRONIDAZOLE 500 MG/100ML
500 INJECTION, SOLUTION INTRAVENOUS
Status: COMPLETED | OUTPATIENT
Start: 2018-09-05 | End: 2018-09-05

## 2018-09-05 RX ORDER — ROCURONIUM BROMIDE 10 MG/ML
INJECTION, SOLUTION INTRAVENOUS
Status: DISCONTINUED | OUTPATIENT
Start: 2018-09-05 | End: 2018-09-05

## 2018-09-05 RX ORDER — HYDROMORPHONE HYDROCHLORIDE 1 MG/ML
1 INJECTION, SOLUTION INTRAMUSCULAR; INTRAVENOUS; SUBCUTANEOUS EVERY 4 HOURS PRN
Status: DISPENSED | OUTPATIENT
Start: 2018-09-05 | End: 2018-09-06

## 2018-09-05 RX ORDER — NALOXONE HCL 0.4 MG/ML
0.02 VIAL (ML) INJECTION
Status: DISCONTINUED | OUTPATIENT
Start: 2018-09-05 | End: 2024-03-26

## 2018-09-05 RX ORDER — MIDAZOLAM HYDROCHLORIDE 1 MG/ML
INJECTION, SOLUTION INTRAMUSCULAR; INTRAVENOUS
Status: DISCONTINUED | OUTPATIENT
Start: 2018-09-05 | End: 2018-09-05

## 2018-09-05 RX ORDER — LIDOCAINE HYDROCHLORIDE 10 MG/ML
1 INJECTION, SOLUTION EPIDURAL; INFILTRATION; INTRACAUDAL; PERINEURAL
Status: COMPLETED | OUTPATIENT
Start: 2018-09-05 | End: 2018-09-05

## 2018-09-05 RX ORDER — BUPIVACAINE HYDROCHLORIDE 2.5 MG/ML
INJECTION, SOLUTION EPIDURAL; INFILTRATION; INTRACAUDAL
Status: DISCONTINUED | OUTPATIENT
Start: 2018-09-05 | End: 2018-09-05 | Stop reason: HOSPADM

## 2018-09-05 RX ORDER — HEPARIN SODIUM 5000 [USP'U]/ML
5000 INJECTION, SOLUTION INTRAVENOUS; SUBCUTANEOUS EVERY 8 HOURS
Status: COMPLETED | OUTPATIENT
Start: 2018-09-05 | End: 2018-09-05

## 2018-09-05 RX ORDER — ONDANSETRON 2 MG/ML
INJECTION INTRAMUSCULAR; INTRAVENOUS
Status: DISCONTINUED | OUTPATIENT
Start: 2018-09-05 | End: 2018-09-05

## 2018-09-05 RX ORDER — LIDOCAINE HCL/PF 100 MG/5ML
SYRINGE (ML) INTRAVENOUS
Status: DISCONTINUED | OUTPATIENT
Start: 2018-09-05 | End: 2018-09-05

## 2018-09-05 RX ORDER — ONDANSETRON 2 MG/ML
4 INJECTION INTRAMUSCULAR; INTRAVENOUS EVERY 6 HOURS PRN
Status: DISCONTINUED | OUTPATIENT
Start: 2018-09-05 | End: 2024-03-26

## 2018-09-05 RX ORDER — OXYCODONE HYDROCHLORIDE 5 MG/1
5 TABLET ORAL EVERY 4 HOURS PRN
Status: DISPENSED | OUTPATIENT
Start: 2018-09-05 | End: 2018-09-06

## 2018-09-05 RX ORDER — PROPOFOL 10 MG/ML
VIAL (ML) INTRAVENOUS
Status: DISCONTINUED | OUTPATIENT
Start: 2018-09-05 | End: 2018-09-05

## 2018-09-05 RX ORDER — GLUCAGON 1 MG
1 KIT INJECTION CONTINUOUS PRN
Status: DISCONTINUED | OUTPATIENT
Start: 2018-09-05 | End: 2024-03-26

## 2018-09-05 RX ORDER — ALVIMOPAN 12 MG/1
12 CAPSULE ORAL 2 TIMES DAILY
Status: DISPENSED | OUTPATIENT
Start: 2018-09-05 | End: 2018-09-12

## 2018-09-05 RX ORDER — SODIUM CHLORIDE 9 MG/ML
INJECTION, SOLUTION INTRAVENOUS
Status: COMPLETED | OUTPATIENT
Start: 2018-09-05 | End: 2018-09-05

## 2018-09-05 RX ORDER — MUPIROCIN 20 MG/G
1 OINTMENT TOPICAL
Status: COMPLETED | OUTPATIENT
Start: 2018-09-05 | End: 2018-09-05

## 2018-09-05 RX ORDER — METRONIDAZOLE 500 MG/100ML
INJECTION, SOLUTION INTRAVENOUS
Status: DISCONTINUED | OUTPATIENT
Start: 2018-09-05 | End: 2018-09-05

## 2018-09-05 RX ORDER — NEOSTIGMINE METHYLSULFATE 1 MG/ML
INJECTION, SOLUTION INTRAVENOUS
Status: DISCONTINUED | OUTPATIENT
Start: 2018-09-05 | End: 2018-09-05

## 2018-09-05 RX ORDER — ACETAMINOPHEN 10 MG/ML
1000 INJECTION, SOLUTION INTRAVENOUS EVERY 8 HOURS
Status: COMPLETED | OUTPATIENT
Start: 2018-09-05 | End: 2018-09-06

## 2018-09-05 RX ORDER — ACETAMINOPHEN 10 MG/ML
1000 INJECTION, SOLUTION INTRAVENOUS
Status: COMPLETED | OUTPATIENT
Start: 2018-09-05 | End: 2018-09-05

## 2018-09-05 RX ORDER — GABAPENTIN 300 MG/1
300 CAPSULE ORAL 3 TIMES DAILY
Status: DISCONTINUED | OUTPATIENT
Start: 2018-09-05 | End: 2024-03-26

## 2018-09-05 RX ADMIN — PROPOFOL 30 MG: 10 INJECTION, EMULSION INTRAVENOUS at 10:09

## 2018-09-05 RX ADMIN — ROCURONIUM BROMIDE 30 MG: 10 INJECTION, SOLUTION INTRAVENOUS at 08:09

## 2018-09-05 RX ADMIN — IBUPROFEN 800 MG: 800 INJECTION INTRAVENOUS at 07:09

## 2018-09-05 RX ADMIN — GLYCOPYRROLATE 0.4 MG: 0.2 INJECTION, SOLUTION INTRAMUSCULAR; INTRAVENOUS at 02:09

## 2018-09-05 RX ADMIN — ROCURONIUM BROMIDE 10 MG: 10 INJECTION, SOLUTION INTRAVENOUS at 11:09

## 2018-09-05 RX ADMIN — ROCURONIUM BROMIDE 10 MG: 10 INJECTION, SOLUTION INTRAVENOUS at 08:09

## 2018-09-05 RX ADMIN — SODIUM CHLORIDE, SODIUM GLUCONATE, SODIUM ACETATE, POTASSIUM CHLORIDE, MAGNESIUM CHLORIDE, SODIUM PHOSPHATE, DIBASIC, AND POTASSIUM PHOSPHATE: .53; .5; .37; .037; .03; .012; .00082 INJECTION, SOLUTION INTRAVENOUS at 08:09

## 2018-09-05 RX ADMIN — LIDOCAINE HYDROCHLORIDE 1 MG: 10 INJECTION, SOLUTION EPIDURAL; INFILTRATION; INTRACAUDAL; PERINEURAL at 07:09

## 2018-09-05 RX ADMIN — ROCURONIUM BROMIDE 10 MG: 10 INJECTION, SOLUTION INTRAVENOUS at 01:09

## 2018-09-05 RX ADMIN — FENTANYL CITRATE 50 MCG: 50 INJECTION, SOLUTION INTRAMUSCULAR; INTRAVENOUS at 08:09

## 2018-09-05 RX ADMIN — PROPOFOL 200 MG: 10 INJECTION, EMULSION INTRAVENOUS at 08:09

## 2018-09-05 RX ADMIN — ALVIMOPAN 12 MG: 12 CAPSULE ORAL at 07:09

## 2018-09-05 RX ADMIN — FENTANYL CITRATE 50 MCG: 50 INJECTION, SOLUTION INTRAMUSCULAR; INTRAVENOUS at 10:09

## 2018-09-05 RX ADMIN — LIDOCAINE HYDROCHLORIDE 90 MG: 20 INJECTION, SOLUTION INTRAVENOUS at 08:09

## 2018-09-05 RX ADMIN — METRONIDAZOLE 500 MG: 500 INJECTION, SOLUTION INTRAVENOUS at 07:09

## 2018-09-05 RX ADMIN — ACETAMINOPHEN 1000 MG: 10 INJECTION, SOLUTION INTRAVENOUS at 03:09

## 2018-09-05 RX ADMIN — IBUPROFEN 800 MG: 800 INJECTION INTRAVENOUS at 10:09

## 2018-09-05 RX ADMIN — PROMETHAZINE HYDROCHLORIDE 6.25 MG: 25 INJECTION INTRAMUSCULAR; INTRAVENOUS at 04:09

## 2018-09-05 RX ADMIN — FENTANYL CITRATE 50 MCG: 50 INJECTION, SOLUTION INTRAMUSCULAR; INTRAVENOUS at 09:09

## 2018-09-05 RX ADMIN — MUPIROCIN 1 G: 20 OINTMENT TOPICAL at 07:09

## 2018-09-05 RX ADMIN — HYDROMORPHONE HYDROCHLORIDE 0.2 MG: 1 INJECTION, SOLUTION INTRAMUSCULAR; INTRAVENOUS; SUBCUTANEOUS at 03:09

## 2018-09-05 RX ADMIN — KETAMINE HYDROCHLORIDE 30 MG: 10 INJECTION, SOLUTION INTRAMUSCULAR; INTRAVENOUS at 08:09

## 2018-09-05 RX ADMIN — ONDANSETRON 4 MG: 2 INJECTION INTRAMUSCULAR; INTRAVENOUS at 03:09

## 2018-09-05 RX ADMIN — CEFTRIAXONE SODIUM 2 G: 2 INJECTION, POWDER, FOR SOLUTION INTRAMUSCULAR; INTRAVENOUS at 07:09

## 2018-09-05 RX ADMIN — DEXAMETHASONE SODIUM PHOSPHATE 4 MG: 4 INJECTION, SOLUTION INTRAMUSCULAR; INTRAVENOUS at 08:09

## 2018-09-05 RX ADMIN — FENTANYL CITRATE 50 MCG: 50 INJECTION, SOLUTION INTRAMUSCULAR; INTRAVENOUS at 01:09

## 2018-09-05 RX ADMIN — GABAPENTIN 300 MG: 300 CAPSULE ORAL at 07:09

## 2018-09-05 RX ADMIN — SODIUM CHLORIDE: 0.9 INJECTION, SOLUTION INTRAVENOUS at 03:09

## 2018-09-05 RX ADMIN — FENTANYL CITRATE 50 MCG: 50 INJECTION, SOLUTION INTRAMUSCULAR; INTRAVENOUS at 12:09

## 2018-09-05 RX ADMIN — NEOSTIGMINE METHYLSULFATE 5 MG: 1 INJECTION INTRAVENOUS at 02:09

## 2018-09-05 RX ADMIN — SODIUM CHLORIDE, SODIUM GLUCONATE, SODIUM ACETATE, POTASSIUM CHLORIDE, MAGNESIUM CHLORIDE, SODIUM PHOSPHATE, DIBASIC, AND POTASSIUM PHOSPHATE: .53; .5; .37; .037; .03; .012; .00082 INJECTION, SOLUTION INTRAVENOUS at 11:09

## 2018-09-05 RX ADMIN — METRONIDAZOLE 500 MG: 500 SOLUTION INTRAVENOUS at 08:09

## 2018-09-05 RX ADMIN — HYDROMORPHONE HYDROCHLORIDE 1 MG: 1 INJECTION, SOLUTION INTRAMUSCULAR; INTRAVENOUS; SUBCUTANEOUS at 06:09

## 2018-09-05 RX ADMIN — SODIUM CHLORIDE: 0.9 INJECTION, SOLUTION INTRAVENOUS at 07:09

## 2018-09-05 RX ADMIN — ACETAMINOPHEN 1000 MG: 10 INJECTION, SOLUTION INTRAVENOUS at 07:09

## 2018-09-05 RX ADMIN — MIDAZOLAM HYDROCHLORIDE 2 MG: 1 INJECTION, SOLUTION INTRAMUSCULAR; INTRAVENOUS at 08:09

## 2018-09-05 RX ADMIN — GABAPENTIN 300 MG: 300 CAPSULE ORAL at 08:09

## 2018-09-05 RX ADMIN — ROCURONIUM BROMIDE 10 MG: 10 INJECTION, SOLUTION INTRAVENOUS at 10:09

## 2018-09-05 RX ADMIN — SUCCINYLCHOLINE CHLORIDE 140 MG: 20 INJECTION, SOLUTION INTRAMUSCULAR; INTRAVENOUS at 08:09

## 2018-09-05 RX ADMIN — PHENYLEPHRINE HYDROCHLORIDE 100 MCG: 10 INJECTION INTRAVENOUS at 01:09

## 2018-09-05 RX ADMIN — FENTANYL CITRATE 50 MCG: 50 INJECTION, SOLUTION INTRAMUSCULAR; INTRAVENOUS at 02:09

## 2018-09-05 RX ADMIN — ROCURONIUM BROMIDE 10 MG: 10 INJECTION, SOLUTION INTRAVENOUS at 12:09

## 2018-09-05 RX ADMIN — ACETAMINOPHEN 1000 MG: 10 INJECTION, SOLUTION INTRAVENOUS at 10:09

## 2018-09-05 RX ADMIN — OXYCODONE HYDROCHLORIDE 5 MG: 5 TABLET ORAL at 08:09

## 2018-09-05 RX ADMIN — SODIUM CHLORIDE, SODIUM GLUCONATE, SODIUM ACETATE, POTASSIUM CHLORIDE, MAGNESIUM CHLORIDE, SODIUM PHOSPHATE, DIBASIC, AND POTASSIUM PHOSPHATE: .53; .5; .37; .037; .03; .012; .00082 INJECTION, SOLUTION INTRAVENOUS at 10:09

## 2018-09-05 RX ADMIN — IBUPROFEN 800 MG: 800 INJECTION INTRAVENOUS at 03:09

## 2018-09-05 RX ADMIN — HEPARIN SODIUM 5000 UNITS: 5000 INJECTION, SOLUTION INTRAVENOUS; SUBCUTANEOUS at 07:09

## 2018-09-05 RX ADMIN — ALVIMOPAN 12 MG: 12 CAPSULE ORAL at 08:09

## 2018-09-05 RX ADMIN — SODIUM CHLORIDE: 0.9 INJECTION, SOLUTION INTRAVENOUS at 08:09

## 2018-09-05 RX ADMIN — ONDANSETRON 4 MG: 2 INJECTION INTRAMUSCULAR; INTRAVENOUS at 02:09

## 2018-09-05 RX ADMIN — PROPOFOL 50 MG: 10 INJECTION, EMULSION INTRAVENOUS at 01:09

## 2018-09-05 RX ADMIN — CEFTRIAXONE 2 G: 1 INJECTION, SOLUTION INTRAVENOUS at 08:09

## 2018-09-05 NOTE — OP NOTE
[facility]  Brief Operative Note    SUMMARY     Surgery Date: 9/5/2018     Surgeon(s) and Role:  Panel 1:     * Papa Lopez MD - Primary     * Patel Barney MD - Fellow  Panel 2:     * Mohan Ventura MD - Primary     * Wilner Keane MD - Resident - Assisting        Pre-Operative Care:  Pre-operative bowel prep Mechanical and PO antibiotics  IV antibiotics given within 1 hour of incision? Yes  Preoperative multimodal pain control? Orfirmev, Calador and TAP    Pre-op Diagnosis:  Hx of adenomatous colonic polyps [Z86.010]    Operative Care:  Post-op Diagnosis: Post-Op Diagnosis Codes:     * Hx of adenomatous colonic polyps [Z86.010]    Procedure(s) (LRB):  COLECTOMY, TOTAL ABDOMINAL, LAPAROSCOPIC -   Insertion-Catheter (Left)    Anesthesia: General  Total volume administered 2800    Total UOP: 575      Technical Procedures Used:    Use of closing instrument setup? Yes  Gown/Glove Change @ time of closing? Yes  Suction tip change at time of closing? Yes  Wound Protector used if open case? N/A    Description of the findings of the procedure:    Wound Class (Contaminated    Complications: No     Estimated Blood Loss: 300          Specimens:   Specimen (12h ago, onward)    Start     Ordered    09/05/18 1422  Specimen to Pathology - Surgery  Once     Comments:  1.) abdominal colon- perm2.) proximal and distal donuts- perm     Start Status   09/05/18 1422 Collected (09/05/18 1422)       09/05/18 1422          Implants: * No implants in log *    Post-Operative Care:         Disposition: PACU - hemodynamically stable.           Condition: Stable  PT Temp >36 upon leaving the OR? Yes

## 2018-09-05 NOTE — H&P
Chief Complaint: Polyposis     History of Present Illness:  Patient is a 57 y.o. male presents with with a history of multiple colonic polyps.  These are adenomatous polyps.  He has had multiple colonoscopies with 100s of polyps seen and biopsied on colonoscopy some adenomatous some hyperplastic.  He is here for discussion of surgery.  He denies any change in his bowel habits no nausea vomiting no abdominal pain.  There is no family history of polyposis or polyposis syndromes.  Or HNP cc associated cancers.2. He underwent a recent flex sig to identify if there was rectal sparing. He was found to have a large polyp burden but they were all hyperplastic have had him perform Buccal swab genetic testing          Review of patient's allergies indicates:   Allergen Reactions    Codeine                Past Medical History:   Diagnosis Date    Hypertension              Past Surgical History:   Procedure Laterality Date    COLONOSCOPY N/A 1/8/2018     Procedure: COLONOSCOPY;  Surgeon: Carson Yañez Jr., MD;  Location: [facility];  Service: Endoscopy;  Laterality: N/A;    COLONOSCOPY N/A 2/19/2018     Procedure: COLONOSCOPY/Polypectomy;  Surgeon: Rex Blanco MD;  Location: [facility] ENDO;  Service: Endoscopy;  Laterality: N/A;    COLONOSCOPY N/A 5/21/2018     Procedure: COLONOSCOPY/Miralax Split;  Surgeon: Malvin Lozano MD;  Location: [facility];  Service: Endoscopy;  Laterality: N/A;    KNEE SURGERY Bilateral       states he has screws    KNEE SURGERY         3 reconstructive surgeries 20yrs go     WRIST SURGERY         2015            Family History   Problem Relation Age of Onset    No Known Problems Mother      Cancer Father      Lung cancer Father      Hyperlipidemia Brother      Hypertension Brother               Social History   Substance Use Topics    Smoking status: Never Smoker    Smokeless tobacco: Never Used    Alcohol use No         Comment:  quit          Review of Systems:  Review of  "Systems      Constitutional: No fever, chills, or change in activity or appetite.      HENT: No hearing loss, swelling, neck pain or stiffness.       Eyes: No  Itching, discharge, and visual disturbance.      Respiratory: No cough, choking or shortness of breath.       Cardiovascular: No leg swelling or chest pain      Gastrointestinal: No abdominal distention or change in bowel habbits     Genitourinary: No dysuria, frequency or flank pain.      Musculoskeletal: No trouble walking or arthralgias.      Neurological: No weakness, dizziness, or seizures .      Hematological: No adenopathy.      Psychiatric/Behavioral: No hallucinations or changes in behavior.  Remainder ROS  Negative except per HPI     OBJECTIVE:   BP (!) 136/90 (BP Location: Left arm, Patient Position: Sitting, BP Method: Large (Automatic))   Pulse (!) 57   Ht 5' 10" (1.778 m)   Wt 98.5 kg (217 lb 2.5 oz)   BMI 31.16 kg/m²         Physical Exam:  General: White male in NAD sitting in chair in clinic  Neuro: aaox4 maex4 perrl  Respiratory: resps even unlabored  Cardiac: cap refill <2 sec  Abdomen: Abdomen soft, nontender. BS normal. No masses, organomegaly or scars.  Extremities: Warm dry and intact        ASSESSMENT/PLAN:      Adenomatous polyps with rectal hyperpalstic polyps.   Will plan on LAP TAC with left ureteral cathater  I have explained the procedure including indications, alternatives, expected outcomes and potential complications. The patient appears to understand and gives informed consent. The patient will be evaluated by the preop center  No change in H+P I have again explained the procedure including indications, alternatives, expected outcomes and potential complications. All questions were answered      "

## 2018-09-05 NOTE — PROGRESS NOTES
Attempt to call report x 2, was told by charge nurse @ 0112 that the room had not been assigned, now Merle receiving nurse stated that she has to change the room assignment, awaiting call back.

## 2018-09-05 NOTE — INTERVAL H&P NOTE
No change in H+P I have again explained the procedure including indications, alternatives, expected outcomes and potential complications. All questions were answered          Active Hospital Problems    Diagnosis  POA    Adenomatous polyp [D36.9]  Yes      Resolved Hospital Problems   No resolved problems to display.

## 2018-09-05 NOTE — TRANSFER OF CARE
"Anesthesia Transfer of Care Note    Patient: Jenni Prescott Jr.    Procedure(s) Performed: Procedure(s) (LRB):  COLECTOMY, TOTAL, LAPAROSCOPIC - lap total (Left)  Insertion-Catheter (Left)    Patient location: PACU    Anesthesia Type: general    Transport from OR: Transported from OR on room air with adequate spontaneous ventilation    Post pain: adequate analgesia    Post assessment: no apparent anesthetic complications and tolerated procedure well    Post vital signs: stable    Level of consciousness: awake and alert    Nausea/Vomiting: nausea and vomiting (zofran 4mg given IV)    Complications: none    Transfer of care protocol was followed      Last vitals:   Visit Vitals  BP (!) 142/94 (BP Location: Left arm, Patient Position: Lying)   Pulse 85   Temp 37.3 °C (99.2 °F) (Axillary)   Resp 15   Ht 5' 10" (1.778 m)   Wt 95.3 kg (210 lb)   SpO2 96%   BMI 30.13 kg/m²     "

## 2018-09-05 NOTE — OP NOTE
[facility]  Operative Note    Date: 09/05/2018    Pre-Op Diagnosis: Multiple colonic polyps, with plan for total abdominal colectomy    Patient Active Problem List    Diagnosis Date Noted    Adenomatous polyp 09/05/2018    Acid reflux 08/29/2018    Serum total bilirubin elevated 08/29/2018    Special screening for malignant neoplasms, colon 06/11/2018    Colon polyp 02/19/2018    Screening for colorectal cancer 01/08/2018    History of prediabetes 11/08/2017    Class 1 obesity with body mass index (BMI) of 30.0 to 30.9 in adult 01/27/2016    Essential hypertension 01/27/2016    Range of motion deficit 07/31/2015    Right wrist pain 07/31/2015    Fall 04/22/2015    Multiple rib fractures 04/22/2015    Pneumothorax, closed, traumatic 04/22/2015    Multiple closed pelvic fractures with disruption of pelvic ring 04/22/2015    Distal radius fracture, right 04/22/2015    Lumbar transverse process fracture     Fall          Post-Op Diagnosis: Same    Procedure(s) Performed:   1.  Cystoscopy with left ureteral catheter placement    Specimen(s): none    Staff Surgeon: Mohan Ventura MD    Assistant Surgeon: Wilner Keane MD    Anesthesia: General endotracheal anesthesia    Indications: Jenni Prescott Jr. is a 58 y.o. male with multiple colonic polyps.  Dr. Lopez has requested intra-operative ureteral catheters to allow for early intra-operative identification and repair of any injuries.      Findings:   Normal appearing bladder, no masses or lesions, normal appearing ureteral orifices in normal anatomic position    Estimated Blood Loss: min    Drains:   1.  left 5 Fr ureteral catheters  2.  16 Fr case catheter    Procedure in Detail: Upon entering the room the patient was under general anesthesia.  The patient was then placed in the dorsal lithotomy position and prepped and draped in the usual sterile fashion. Preoperative antibiotics were administered per the primary surgeon  preference.  Timeout was performed.      A 22 Fr cystoscope was inserted into the urethra and formal cystourethroscopy was performed. The urethra was normal.  The right and left ureteral orifices were in the normal anatomic position. There were no mucosal abnormalities. A 5 Fr ureteral catheter was inserted into the left  ureteral orifice and advanced to the level of the left renal pelvis.The cystoscope was then removed leaving the ureteral catheter in place.     A 16 Fr case catheter was inserted and the balloon was filled with 10mL of sterile water. The ureteral catheters were secured in the standard fashion. There were no complications with the procedure and the patient tolerated our procedure well.     The case was then turned over to the primary surgeon.     Wilner Keane MD

## 2018-09-06 LAB
ABO + RH BLD: NORMAL
ANION GAP SERPL CALC-SCNC: 5 MMOL/L
BASOPHILS # BLD AUTO: 0.01 K/UL
BASOPHILS NFR BLD: 0.1 %
BLD GP AB SCN CELLS X3 SERPL QL: NORMAL
BUN SERPL-MCNC: 12 MG/DL
CALCIUM SERPL-MCNC: 7.9 MG/DL
CHLORIDE SERPL-SCNC: 108 MMOL/L
CO2 SERPL-SCNC: 26 MMOL/L
CREAT SERPL-MCNC: 0.8 MG/DL
DIFFERENTIAL METHOD: ABNORMAL
EOSINOPHIL # BLD AUTO: 0 K/UL
EOSINOPHIL NFR BLD: 0 %
ERYTHROCYTE [DISTWIDTH] IN BLOOD BY AUTOMATED COUNT: 13.6 %
EST. GFR  (AFRICAN AMERICAN): >60 ML/MIN/1.73 M^2
EST. GFR  (NON AFRICAN AMERICAN): >60 ML/MIN/1.73 M^2
GLUCOSE SERPL-MCNC: 100 MG/DL
HCT VFR BLD AUTO: 35.2 %
HGB BLD-MCNC: 11.5 G/DL
IMM GRANULOCYTES # BLD AUTO: 0.04 K/UL
IMM GRANULOCYTES NFR BLD AUTO: 0.5 %
LYMPHOCYTES # BLD AUTO: 1.5 K/UL
LYMPHOCYTES NFR BLD: 17.5 %
MCH RBC QN AUTO: 29.6 PG
MCHC RBC AUTO-ENTMCNC: 32.7 G/DL
MCV RBC AUTO: 91 FL
MONOCYTES # BLD AUTO: 1 K/UL
MONOCYTES NFR BLD: 11.5 %
NEUTROPHILS # BLD AUTO: 6 K/UL
NEUTROPHILS NFR BLD: 70.4 %
NRBC BLD-RTO: 0 /100 WBC
PLATELET # BLD AUTO: 165 K/UL
PMV BLD AUTO: 10.5 FL
POTASSIUM SERPL-SCNC: 3.8 MMOL/L
RBC # BLD AUTO: 3.88 M/UL
SODIUM SERPL-SCNC: 139 MMOL/L
WBC # BLD AUTO: 8.47 K/UL

## 2018-09-06 PROCEDURE — 25000003 PHARM REV CODE 250: Performed by: STUDENT IN AN ORGANIZED HEALTH CARE EDUCATION/TRAINING PROGRAM

## 2018-09-06 PROCEDURE — 63600175 PHARM REV CODE 636 W HCPCS: Performed by: COLON & RECTAL SURGERY

## 2018-09-06 PROCEDURE — 36415 COLL VENOUS BLD VENIPUNCTURE: CPT

## 2018-09-06 PROCEDURE — 25000003 PHARM REV CODE 250: Performed by: NURSE PRACTITIONER

## 2018-09-06 PROCEDURE — 86850 RBC ANTIBODY SCREEN: CPT

## 2018-09-06 PROCEDURE — 85025 COMPLETE CBC W/AUTO DIFF WBC: CPT

## 2018-09-06 PROCEDURE — 63600175 PHARM REV CODE 636 W HCPCS: Performed by: NURSE PRACTITIONER

## 2018-09-06 PROCEDURE — 20600001 HC STEP DOWN PRIVATE ROOM

## 2018-09-06 PROCEDURE — 80048 BASIC METABOLIC PNL TOTAL CA: CPT

## 2018-09-06 RX ORDER — HYDROMORPHONE HYDROCHLORIDE 2 MG/ML
1 INJECTION, SOLUTION INTRAMUSCULAR; INTRAVENOUS; SUBCUTANEOUS EVERY 4 HOURS PRN
Status: DISCONTINUED | OUTPATIENT
Start: 2018-09-06 | End: 2018-09-06

## 2018-09-06 RX ORDER — DEXTROSE MONOHYDRATE, SODIUM CHLORIDE, AND POTASSIUM CHLORIDE 50; 1.49; 4.5 G/1000ML; G/1000ML; G/1000ML
INJECTION, SOLUTION INTRAVENOUS CONTINUOUS
Status: DISCONTINUED | OUTPATIENT
Start: 2018-09-06 | End: 2024-03-26

## 2018-09-06 RX ORDER — HYDROMORPHONE HYDROCHLORIDE 1 MG/ML
1 INJECTION, SOLUTION INTRAMUSCULAR; INTRAVENOUS; SUBCUTANEOUS EVERY 4 HOURS PRN
Status: DISPENSED | OUTPATIENT
Start: 2018-09-06 | End: 2018-09-07

## 2018-09-06 RX ORDER — OXYCODONE HYDROCHLORIDE 5 MG/1
5 TABLET ORAL EVERY 4 HOURS PRN
Status: DISPENSED | OUTPATIENT
Start: 2018-09-06 | End: 2018-09-07

## 2018-09-06 RX ADMIN — HYDROMORPHONE HYDROCHLORIDE 1 MG: 1 INJECTION, SOLUTION INTRAMUSCULAR; INTRAVENOUS; SUBCUTANEOUS at 12:09

## 2018-09-06 RX ADMIN — OXYCODONE HYDROCHLORIDE 5 MG: 5 TABLET ORAL at 05:09

## 2018-09-06 RX ADMIN — ALVIMOPAN 12 MG: 12 CAPSULE ORAL at 11:09

## 2018-09-06 RX ADMIN — IBUPROFEN 800 MG: 800 INJECTION INTRAVENOUS at 11:09

## 2018-09-06 RX ADMIN — ONDANSETRON HYDROCHLORIDE 4 MG: 2 INJECTION, SOLUTION INTRAMUSCULAR; INTRAVENOUS at 10:09

## 2018-09-06 RX ADMIN — HYDROMORPHONE HYDROCHLORIDE 1 MG: 1 INJECTION, SOLUTION INTRAMUSCULAR; INTRAVENOUS; SUBCUTANEOUS at 07:09

## 2018-09-06 RX ADMIN — DEXTROSE MONOHYDRATE, SODIUM CHLORIDE, AND POTASSIUM CHLORIDE: 50; 4.5; 1.49 INJECTION, SOLUTION INTRAVENOUS at 10:09

## 2018-09-06 RX ADMIN — ALVIMOPAN 12 MG: 12 CAPSULE ORAL at 10:09

## 2018-09-06 RX ADMIN — IBUPROFEN 800 MG: 800 INJECTION INTRAVENOUS at 09:09

## 2018-09-06 RX ADMIN — GABAPENTIN 300 MG: 300 CAPSULE ORAL at 11:09

## 2018-09-06 RX ADMIN — OXYCODONE HYDROCHLORIDE 5 MG: 5 TABLET ORAL at 11:09

## 2018-09-06 RX ADMIN — ACETAMINOPHEN 1000 MG: 10 INJECTION, SOLUTION INTRAVENOUS at 05:09

## 2018-09-06 RX ADMIN — IBUPROFEN 800 MG: 800 INJECTION INTRAVENOUS at 03:09

## 2018-09-06 RX ADMIN — ENOXAPARIN SODIUM 40 MG: 100 INJECTION SUBCUTANEOUS at 03:09

## 2018-09-06 RX ADMIN — HYDROMORPHONE HYDROCHLORIDE 1 MG: 1 INJECTION, SOLUTION INTRAMUSCULAR; INTRAVENOUS; SUBCUTANEOUS at 10:09

## 2018-09-06 RX ADMIN — GABAPENTIN 300 MG: 300 CAPSULE ORAL at 10:09

## 2018-09-06 RX ADMIN — GABAPENTIN 300 MG: 300 CAPSULE ORAL at 03:09

## 2018-09-06 RX ADMIN — ONDANSETRON HYDROCHLORIDE 4 MG: 2 INJECTION, SOLUTION INTRAMUSCULAR; INTRAVENOUS at 06:09

## 2018-09-06 NOTE — PLAN OF CARE
09/06/18 1304   Discharge Assessment   Assessment Type Discharge Planning Assessment   Assessment information obtained from? Medical Record   Expected Length of Stay (days) 3   Prior to hospitilization cognitive status: Alert/Oriented   Prior to hospitalization functional status: Independent   Current cognitive status: Alert/Oriented   Current Functional Status: Needs Assistance   Lives With spouse   Able to Return to Prior Arrangements yes   Is patient able to care for self after discharge? Unable to determine at this time (comments)   Patient's perception of discharge disposition home or selfcare;home health   Readmission Within The Last 30 Days no previous admission in last 30 days   Patient currently being followed by outpatient case management? No   Equipment Currently Used at Home none   Do you have any problems affording any of your prescribed medications? No   Is the patient taking medications as prescribed? yes   Does the patient have transportation home? Yes   Transportation Available family or friend will provide   Discharge Plan A Home Health   Discharge Plan B Home   Patient/Family In Agreement With Plan yes   CM/SW to follow for d/c needs.

## 2018-09-06 NOTE — SUBJECTIVE & OBJECTIVE
Subjective:     Interval History: s/p lap total colecotmy. No acute events overnight. Pain controlled.    Post-Op Info:  Procedure(s) (LRB):  COLECTOMY, TOTAL, LAPAROSCOPIC - lap total (Left)  Insertion-Catheter (Left)   1 Day Post-Op      Medications:  Continuous Infusions:   dextrose 5 % and 0.45 % NaCl with KCl 20 mEq      glucagon (human recombinant)       Scheduled Meds:   alvimopan  12 mg Oral BID    enoxparin  40 mg Subcutaneous Q24H    gabapentin  300 mg Oral TID    ibuprofen  800 mg Intravenous Q8H     PRN Meds:   dextrose 50%    dextrose 50%    dextrose 50%    gabapentin    glucagon (human recombinant)    glucose    glucose    glucose    HYDROmorphone    naloxone    ondansetron    oxyCODONE    promethazine (PHENERGAN) IVPB        Objective:     Vital Signs (Most Recent):  Temp: 98.8 °F (37.1 °C) (09/06/18 0819)  Pulse: (!) 59 (09/06/18 0819)  Resp: 18 (09/06/18 0819)  BP: 139/82 (09/06/18 0819)  SpO2: 95 % (09/06/18 0819) Vital Signs (24h Range):  Temp:  [97.8 °F (36.6 °C)-99.2 °F (37.3 °C)] 98.8 °F (37.1 °C)  Pulse:  [59-85] 59  Resp:  [11-18] 18  SpO2:  [94 %-97 %] 95 %  BP: (113-142)/(70-94) 139/82     Intake/Output - Last 3 Shifts       09/04 0700 - 09/05 0659 09/05 0700 - 09/06 0659 09/06 0700 - 09/07 0659    P.O.  150     I.V. (mL/kg)  4258.3 (44.7)     IV Piggyback  450     Total Intake(mL/kg)  4858.3 (51)     Urine (mL/kg/hr)  2275 (1)     Emesis/NG output  0     Other  0     Stool  0     Blood  300     Total Output  2575     Net  +2283.3            Stool Occurrence  0 x     Emesis Occurrence  0 x           Physical Exam   Constitutional: He is oriented to person, place, and time. He appears well-developed and well-nourished. No distress.   HENT:   Head: Normocephalic and atraumatic.   Eyes: Conjunctivae are normal.   Neck: Normal range of motion. Neck supple.   Cardiovascular: Normal rate and regular rhythm.   Pulmonary/Chest: Effort normal. No respiratory distress.    Abdominal: Soft. He exhibits no distension and no mass. There is tenderness (incisional). There is no guarding. A hernia (inguinal) is present.   Neurological: He is alert and oriented to person, place, and time.   Skin: Skin is warm and dry. He is not diaphoretic.     Significant Labs:  BMP (Last 3 Results): No results for input(s): GLU, NA, K, CL, CO2, BUN, CREATININE, CALCIUM, MG in the last 168 hours.  CBC (Last 3 Results):   Recent Labs   Lab  09/06/18   0810   WBC  8.47   RBC  3.88*   HGB  11.5*   HCT  35.2*   PLT  165   MCV  91   MCH  29.6   MCHC  32.7       Significant Diagnostics:  None

## 2018-09-06 NOTE — PLAN OF CARE
Problem: Patient Care Overview  Goal: Plan of Care Review  Outcome: Ongoing (interventions implemented as appropriate)  Plan of care reviewed with patient. Patient and spouse verbalized understanding. Patient is oriented x4. Patient tolerated Ice chips. Patient case remained intact all shift and emptied per MD order. Patient received prn pain medication per MD mar. Patient IVF remained continuously throughout shift. Patient remained free of any falls or acute events. VSS, Will continue to monitor.

## 2018-09-06 NOTE — ANESTHESIA POSTPROCEDURE EVALUATION
"Anesthesia Post Evaluation    Patient: Jenni Prescott     Procedure(s) Performed: Procedure(s) (LRB):  COLECTOMY, TOTAL, LAPAROSCOPIC - lap total (Left)  Insertion-Catheter (Left)    Final Anesthesia Type: general  Patient location during evaluation: PACU  Patient participation: Yes- Able to Participate  Level of consciousness: awake and alert  Post-procedure vital signs: reviewed and stable  Pain management: adequate  Airway patency: patent  PONV status at discharge: No PONV  Anesthetic complications: no      Cardiovascular status: blood pressure returned to baseline  Respiratory status: unassisted, room air and spontaneous ventilation  Hydration status: euvolemic  Follow-up not needed.        Visit Vitals  /82 (BP Location: Left arm, Patient Position: Lying)   Pulse (!) 59   Temp 37.1 °C (98.8 °F) (Oral)   Resp 18   Ht 5' 10" (1.778 m)   Wt 95.3 kg (210 lb)   SpO2 95%   BMI 30.13 kg/m²       Pain/Milly Score: Pain Assessment Performed: Yes (9/5/2018  8:00 PM)  Presence of Pain: complains of pain/discomfort (9/5/2018  8:00 PM)  Pain Rating Prior to Med Admin: 8 (9/6/2018  5:48 AM)  Pain Rating Post Med Admin: 5 (9/5/2018  5:15 PM)  Milly Score: 10 (9/5/2018  5:15 PM)        "

## 2018-09-06 NOTE — PROGRESS NOTES
[facility]  Colorectal Surgery  Progress Note    Patient Name: Jenni Prescott Jr.  MRN: [id number]  Admission Date: 9/5/2018  Hospital Length of Stay: 1 days  Attending Physician: Papa Lopez MD    Subjective:     Interval History: s/p lap total colecotmy. No acute events overnight. Pain controlled.    Post-Op Info:  Procedure(s) (LRB):  COLECTOMY, TOTAL, LAPAROSCOPIC - lap total (Left)  Insertion-Catheter (Left)   1 Day Post-Op      Medications:  Continuous Infusions:   dextrose 5 % and 0.45 % NaCl with KCl 20 mEq      glucagon (human recombinant)       Scheduled Meds:   alvimopan  12 mg Oral BID    enoxparin  40 mg Subcutaneous Q24H    gabapentin  300 mg Oral TID    ibuprofen  800 mg Intravenous Q8H     PRN Meds:   dextrose 50%    dextrose 50%    dextrose 50%    gabapentin    glucagon (human recombinant)    glucose    glucose    glucose    HYDROmorphone    naloxone    ondansetron    oxyCODONE    promethazine (PHENERGAN) IVPB        Objective:     Vital Signs (Most Recent):  Temp: 98.8 °F (37.1 °C) (09/06/18 0819)  Pulse: (!) 59 (09/06/18 0819)  Resp: 18 (09/06/18 0819)  BP: 139/82 (09/06/18 0819)  SpO2: 95 % (09/06/18 0819) Vital Signs (24h Range):  Temp:  [97.8 °F (36.6 °C)-99.2 °F (37.3 °C)] 98.8 °F (37.1 °C)  Pulse:  [59-85] 59  Resp:  [11-18] 18  SpO2:  [94 %-97 %] 95 %  BP: (113-142)/(70-94) 139/82     Intake/Output - Last 3 Shifts       09/04 0700 - 09/05 0659 09/05 0700 - 09/06 0659 09/06 0700 - 09/07 0659    P.O.  150     I.V. (mL/kg)  4258.3 (44.7)     IV Piggyback  450     Total Intake(mL/kg)  4858.3 (51)     Urine (mL/kg/hr)  2275 (1)     Emesis/NG output  0     Other  0     Stool  0     Blood  300     Total Output  2575     Net  +2283.3            Stool Occurrence  0 x     Emesis Occurrence  0 x           Physical Exam   Constitutional: He is oriented to person, place, and time. He appears well-developed and well-nourished. No distress.   HENT:   Head:  Normocephalic and atraumatic.   Eyes: Conjunctivae are normal.   Neck: Normal range of motion. Neck supple.   Cardiovascular: Normal rate and regular rhythm.   Pulmonary/Chest: Effort normal. No respiratory distress.   Abdominal: Soft. He exhibits no distension and no mass. There is tenderness (incisional). There is no guarding. A hernia (inguinal) is present.   Neurological: He is alert and oriented to person, place, and time.   Skin: Skin is warm and dry. He is not diaphoretic.     Significant Labs:  BMP (Last 3 Results): No results for input(s): GLU, NA, K, CL, CO2, BUN, CREATININE, CALCIUM, MG in the last 168 hours.  CBC (Last 3 Results):   Recent Labs   Lab  09/06/18   0810   WBC  8.47   RBC  3.88*   HGB  11.5*   HCT  35.2*   PLT  165   MCV  91   MCH  29.6   MCHC  32.7       Significant Diagnostics:  None    Assessment/Plan:     * Adenomatous polyp    1 Day Post-Op total abdominal colectomy with ileorectal anastomosis, doing well    Clear liquids  Isaac in until urine clears from blood  Multimodal pain control  OOB/ambulate  Incentive spirometry  Home meds              Patel Barney MD  Colorectal Surgery  [facility]

## 2018-09-06 NOTE — OP NOTE
DATE OF PROCEDURE:  09/05/2018.    PREOPERATIVE DIAGNOSIS:  Polyposis.    POSTOPERATIVE DIAGNOSIS:  Polyposis.    PROCEDURE:  Total abdominal colectomy, ileorectostomy.    SURGEON:  Papa Lopez M.D.    RESIDENT:  Dr. Barney.    ANESTHESIA:  General endotracheal.    INDICATION:  Mr. Prescott is a 58-year-old male with known polyposis, here for   total abdominal colectomy and ileorectostomy.    PROCEDURE IN DETAIL:  The patient was brought to the Operating Room and placed   in modified lithotomy position.  All pressure points were padded.  The abdomen   was prepped and draped in a sterile manner.  [facility] placed   a left ureteral catheter.  This will be dictated under separate cover.  Abdomen   was prepped and draped in a sterile manner.  Rectal irrigation was performed.    Using a #10 blade, 10 mm supraumbilical incision was made under direct vision, a   blunt-tipped trocar was placed.  Pneumoperitoneum was established.  Three   trocars were placed.  A 5 mm right upper quadrant, a 12 mm in the right lower   quadrant and an 11 mm in the left mid abdomen.  The patient was then placed into   reverse Trendelenburg and rotated to his right.  The splenic flexure was   addressed first.  The inferior mesenteric vein was identified and lateral to the   duodenum, was divided and swept laterally anterior to Gerota's fascia to the   lateral sidewall.  Dissection was then carried down inferiorly and superiorly to   the inferior edge of the pancreas.  At this point, the lesser sac was opened   and this was just to the left of the falciform.  Dissection was then carried   down all the way around completely mobilizing the splenic flexure.  Once this   was done, the splenic flexure was then elevated off ensuring that the pancreas   was away and it was divided to the falciform ligament with the EnSeal energy   source.  At this point, the patient was placed in Trendelenburg and rotated to   his right.  The  inferior mesenteric artery was identified, mobilized, and the   left ureter was clearly visible and identified.  Dissection was then carried   down to the rectosigmoid junction.  This completely mobilized the left colon.    At this point, the patient was then rotated to his left.  The avascular plane   inferior to the small bowel mesentery was identified and mobilized.  The   duodenum was visualized and swept medially.  Once this was done, the plane   anterior to the duodenum was identified.  Dissection was then carried down, the   ileocolic artery was identified and ligated with the EnSeal energy source.  This   was carried up the right gutter.  The hepatic flexure was completely mobilized.    The omentum was then taken off of the colon and this allowed easy mobilization   and division of the transverse mesocolon.  Once this was done, the patient was   then placed in neutral except in Trendelenburg.  The rectosigmoid junction was   identified, mobilized and divided with a single fire of the Endo-VIRGILIO.  At the   extraction site, a Pfannenstiel incision was made.  The colon was brought out   through there.  The anvil for a 29 circular stapler was placed and an end-to-end   ileorectal anastomosis was fashioned.  Colonoscopic evaluation showed no   evidence of an air leak, no evidence of bleeding.  Hemostasis was assured.    Sponge and needle count was correct.  The abdomen was copiously irrigated   through the extraction site and pneumoperitoneum was reestablished again to   remove the effluent and assure there was no bleeding.  Please note a TAPS block   with Marcaine, Exparel and lidocaine was placed after induction of   pneumoperitoneum.  Sponge and needle counts were correct.  The patient tolerated   the procedure and transferred to the Recovery Room in stable condition.      RONNIE/ANTONIETA  dd: 09/06/2018 10:36:00 (CDT)  td: 09/06/2018 11:10:19 (CDT)  Doc ID   #[id number]  Job ID #[id number]    CC:

## 2018-09-06 NOTE — ASSESSMENT & PLAN NOTE
1 Day Post-Op total abdominal colectomy with ileorectal anastomosis, doing well    Clear liquids  Isaac in until urine clears from blood  Multimodal pain control  OOB/ambulate  Incentive spirometry  Home meds

## 2018-09-07 ENCOUNTER — TELEPHONE (OUTPATIENT)
Dept: SURGERY | Facility: CLINIC | Age: 58
End: 2018-09-07

## 2018-09-07 LAB
ANION GAP SERPL CALC-SCNC: 8 MMOL/L
BASOPHILS # BLD AUTO: 0.03 K/UL
BASOPHILS NFR BLD: 0.3 %
BUN SERPL-MCNC: 7 MG/DL
CALCIUM SERPL-MCNC: 8.6 MG/DL
CHLORIDE SERPL-SCNC: 106 MMOL/L
CO2 SERPL-SCNC: 26 MMOL/L
CREAT SERPL-MCNC: 0.8 MG/DL
DIFFERENTIAL METHOD: ABNORMAL
EOSINOPHIL # BLD AUTO: 0 K/UL
EOSINOPHIL NFR BLD: 0.3 %
ERYTHROCYTE [DISTWIDTH] IN BLOOD BY AUTOMATED COUNT: 13.7 %
EST. GFR  (AFRICAN AMERICAN): >60 ML/MIN/1.73 M^2
EST. GFR  (NON AFRICAN AMERICAN): >60 ML/MIN/1.73 M^2
GLUCOSE SERPL-MCNC: 114 MG/DL
HCT VFR BLD AUTO: 36.4 %
HGB BLD-MCNC: 11.7 G/DL
IMM GRANULOCYTES # BLD AUTO: 0.04 K/UL
IMM GRANULOCYTES NFR BLD AUTO: 0.4 %
LYMPHOCYTES # BLD AUTO: 2.1 K/UL
LYMPHOCYTES NFR BLD: 19.3 %
MAGNESIUM SERPL-MCNC: 2 MG/DL
MCH RBC QN AUTO: 29.5 PG
MCHC RBC AUTO-ENTMCNC: 32.1 G/DL
MCV RBC AUTO: 92 FL
MONOCYTES # BLD AUTO: 0.8 K/UL
MONOCYTES NFR BLD: 7.5 %
NEUTROPHILS # BLD AUTO: 7.7 K/UL
NEUTROPHILS NFR BLD: 72.2 %
NRBC BLD-RTO: 0 /100 WBC
PHOSPHATE SERPL-MCNC: 1.4 MG/DL
PLATELET # BLD AUTO: 180 K/UL
PMV BLD AUTO: 10.7 FL
POTASSIUM SERPL-SCNC: 3.6 MMOL/L
RBC # BLD AUTO: 3.96 M/UL
SODIUM SERPL-SCNC: 140 MMOL/L
WBC # BLD AUTO: 10.68 K/UL

## 2018-09-07 PROCEDURE — 63600175 PHARM REV CODE 636 W HCPCS: Performed by: COLON & RECTAL SURGERY

## 2018-09-07 PROCEDURE — 25000003 PHARM REV CODE 250: Performed by: STUDENT IN AN ORGANIZED HEALTH CARE EDUCATION/TRAINING PROGRAM

## 2018-09-07 PROCEDURE — 80048 BASIC METABOLIC PNL TOTAL CA: CPT

## 2018-09-07 PROCEDURE — 63600175 PHARM REV CODE 636 W HCPCS: Performed by: STUDENT IN AN ORGANIZED HEALTH CARE EDUCATION/TRAINING PROGRAM

## 2018-09-07 PROCEDURE — 85025 COMPLETE CBC W/AUTO DIFF WBC: CPT

## 2018-09-07 PROCEDURE — 63600175 PHARM REV CODE 636 W HCPCS: Performed by: NURSE PRACTITIONER

## 2018-09-07 PROCEDURE — 36415 COLL VENOUS BLD VENIPUNCTURE: CPT

## 2018-09-07 PROCEDURE — 20600001 HC STEP DOWN PRIVATE ROOM

## 2018-09-07 PROCEDURE — 99900035 HC TECH TIME PER 15 MIN (STAT)

## 2018-09-07 PROCEDURE — 94761 N-INVAS EAR/PLS OXIMETRY MLT: CPT

## 2018-09-07 PROCEDURE — 25000003 PHARM REV CODE 250: Performed by: NURSE PRACTITIONER

## 2018-09-07 PROCEDURE — 84100 ASSAY OF PHOSPHORUS: CPT

## 2018-09-07 PROCEDURE — 83735 ASSAY OF MAGNESIUM: CPT

## 2018-09-07 RX ORDER — OXYCODONE HYDROCHLORIDE 5 MG/1
5 TABLET ORAL EVERY 6 HOURS PRN
Status: DISCONTINUED | OUTPATIENT
Start: 2018-09-07 | End: 2018-09-08 | Stop reason: HOSPADM

## 2018-09-07 RX ORDER — ACETAMINOPHEN 10 MG/ML
1000 INJECTION, SOLUTION INTRAVENOUS EVERY 8 HOURS
Status: COMPLETED | OUTPATIENT
Start: 2018-09-07 | End: 2018-09-08

## 2018-09-07 RX ORDER — ATORVASTATIN CALCIUM 20 MG/1
20 TABLET, FILM COATED ORAL DAILY
Status: DISCONTINUED | OUTPATIENT
Start: 2018-09-07 | End: 2018-09-08 | Stop reason: HOSPADM

## 2018-09-07 RX ORDER — LISINOPRIL AND HYDROCHLOROTHIAZIDE 10; 12.5 MG/1; MG/1
1 TABLET ORAL EVERY MORNING
Status: DISCONTINUED | OUTPATIENT
Start: 2018-09-07 | End: 2018-09-08 | Stop reason: HOSPADM

## 2018-09-07 RX ORDER — HYDROMORPHONE HYDROCHLORIDE 2 MG/ML
1 INJECTION, SOLUTION INTRAMUSCULAR; INTRAVENOUS; SUBCUTANEOUS EVERY 6 HOURS PRN
Status: DISCONTINUED | OUTPATIENT
Start: 2018-09-07 | End: 2018-09-08 | Stop reason: HOSPADM

## 2018-09-07 RX ORDER — PANTOPRAZOLE SODIUM 40 MG/1
40 TABLET, DELAYED RELEASE ORAL 2 TIMES DAILY
Status: DISCONTINUED | OUTPATIENT
Start: 2018-09-07 | End: 2018-09-08 | Stop reason: HOSPADM

## 2018-09-07 RX ORDER — OXYCODONE HYDROCHLORIDE 5 MG/1
10 TABLET ORAL EVERY 6 HOURS PRN
Status: DISCONTINUED | OUTPATIENT
Start: 2018-09-07 | End: 2018-09-08 | Stop reason: HOSPADM

## 2018-09-07 RX ADMIN — IBUPROFEN 800 MG: 800 INJECTION INTRAVENOUS at 06:09

## 2018-09-07 RX ADMIN — PANTOPRAZOLE SODIUM 40 MG: 40 TABLET, DELAYED RELEASE ORAL at 09:09

## 2018-09-07 RX ADMIN — HYDROMORPHONE HYDROCHLORIDE 1 MG: 1 INJECTION, SOLUTION INTRAMUSCULAR; INTRAVENOUS; SUBCUTANEOUS at 03:09

## 2018-09-07 RX ADMIN — GABAPENTIN 300 MG: 300 CAPSULE ORAL at 08:09

## 2018-09-07 RX ADMIN — IBUPROFEN 800 MG: 800 INJECTION INTRAVENOUS at 11:09

## 2018-09-07 RX ADMIN — ATORVASTATIN CALCIUM 20 MG: 20 TABLET, FILM COATED ORAL at 10:09

## 2018-09-07 RX ADMIN — IBUPROFEN 800 MG: 800 INJECTION INTRAVENOUS at 03:09

## 2018-09-07 RX ADMIN — OXYCODONE HYDROCHLORIDE 10 MG: 5 TABLET ORAL at 11:09

## 2018-09-07 RX ADMIN — LISINOPRIL AND HYDROCHLOROTHIAZIDE 1 TABLET: 12.5; 1 TABLET ORAL at 10:09

## 2018-09-07 RX ADMIN — OXYCODONE HYDROCHLORIDE 10 MG: 5 TABLET ORAL at 08:09

## 2018-09-07 RX ADMIN — GABAPENTIN 300 MG: 300 CAPSULE ORAL at 09:09

## 2018-09-07 RX ADMIN — HYDROMORPHONE HYDROCHLORIDE 1 MG: 1 INJECTION, SOLUTION INTRAMUSCULAR; INTRAVENOUS; SUBCUTANEOUS at 06:09

## 2018-09-07 RX ADMIN — ALVIMOPAN 12 MG: 12 CAPSULE ORAL at 08:09

## 2018-09-07 RX ADMIN — ENOXAPARIN SODIUM 40 MG: 100 INJECTION SUBCUTANEOUS at 02:09

## 2018-09-07 RX ADMIN — HYDROMORPHONE HYDROCHLORIDE 1 MG: 2 INJECTION INTRAMUSCULAR; INTRAVENOUS; SUBCUTANEOUS at 09:09

## 2018-09-07 RX ADMIN — ACETAMINOPHEN 1000 MG: 10 INJECTION, SOLUTION INTRAVENOUS at 09:09

## 2018-09-07 RX ADMIN — DEXTROSE MONOHYDRATE, SODIUM CHLORIDE, AND POTASSIUM CHLORIDE: 50; 4.5; 1.49 INJECTION, SOLUTION INTRAVENOUS at 10:09

## 2018-09-07 RX ADMIN — OXYCODONE HYDROCHLORIDE 10 MG: 5 TABLET ORAL at 01:09

## 2018-09-07 RX ADMIN — ALVIMOPAN 12 MG: 12 CAPSULE ORAL at 09:09

## 2018-09-07 RX ADMIN — ACETAMINOPHEN 1000 MG: 10 INJECTION, SOLUTION INTRAVENOUS at 02:09

## 2018-09-07 RX ADMIN — PANTOPRAZOLE SODIUM 40 MG: 40 TABLET, DELAYED RELEASE ORAL at 10:09

## 2018-09-07 RX ADMIN — GABAPENTIN 300 MG: 300 CAPSULE ORAL at 02:09

## 2018-09-07 NOTE — SUBJECTIVE & OBJECTIVE
Subjective:     Interval History: no acute events. Passing flatus. Pain controlled. Tolerating clears.    Post-Op Info:  Procedure(s) (LRB):  COLECTOMY, TOTAL, LAPAROSCOPIC - lap total (Left)  Insertion-Catheter (Left)   2 Days Post-Op      Medications:  Continuous Infusions:   dextrose 5 % and 0.45 % NaCl with KCl 20 mEq 50 mL/hr at 09/07/18 1046    glucagon (human recombinant)       Scheduled Meds:   acetaminophen  1,000 mg Intravenous Q8H    alvimopan  12 mg Oral BID    atorvastatin  20 mg Oral Daily    enoxparin  40 mg Subcutaneous Q24H    gabapentin  300 mg Oral TID    ibuprofen  800 mg Intravenous Q8H    lisinopril-hydrochlorothiazide  1 tablet Oral QAM    pantoprazole  40 mg Oral BID     PRN Meds:   dextrose 50%    dextrose 50%    dextrose 50%    gabapentin    glucagon (human recombinant)    glucose    glucose    glucose    HYDROmorphone    naloxone    ondansetron    oxyCODONE    oxyCODONE    promethazine (PHENERGAN) IVPB        Objective:     Vital Signs (Most Recent):  Temp: 99.7 °F (37.6 °C) (09/07/18 1104)  Pulse: 63 (09/07/18 1104)  Resp: 18 (09/07/18 1104)  BP: (!) 146/86 (09/07/18 1104)  SpO2: 95 % (09/07/18 1104) Vital Signs (24h Range):  Temp:  [97.8 °F (36.6 °C)-99.7 °F (37.6 °C)] 99.7 °F (37.6 °C)  Pulse:  [50-69] 63  Resp:  [14-18] 18  SpO2:  [94 %-96 %] 95 %  BP: (133-155)/(74-88) 146/86     Intake/Output - Last 3 Shifts       09/05 0700 - 09/06 0659 09/06 0700 - 09/07 0659 09/07 0700 - 09/08 0659    P.O. 150      I.V. (mL/kg) 4258.3 (44.7) 716.7 (7.5) 299.2 (3.1)    IV Piggyback 450 750 250    Total Intake(mL/kg) 4858.3 (51) 1466.7 (15.4) 549.2 (5.8)    Urine (mL/kg/hr) 2275 (1) 3500 (1.5) 300 (0.7)    Emesis/NG output 0      Other 0      Stool 0      Blood 300      Total Output 2575 3500 300    Net +2283.3 -2033.3 +249.2           Stool Occurrence 0 x      Emesis Occurrence 0 x            Physical Exam   Constitutional: He is oriented to person, place, and time. He  appears well-developed and well-nourished. No distress.   HENT:   Head: Normocephalic and atraumatic.   Eyes: Conjunctivae are normal.   Neck: Normal range of motion. Neck supple.   Cardiovascular: Normal rate and regular rhythm.   Pulmonary/Chest: Effort normal. No respiratory distress.   Abdominal: Soft. He exhibits no distension and no mass. There is tenderness (incisional). There is no guarding. A hernia (inguinal) is present.   Neurological: He is alert and oriented to person, place, and time.   Skin: Skin is warm and dry. He is not diaphoretic.     Significant Labs:  BMP (Last 3 Results):   Recent Labs   Lab  09/06/18   0810  09/07/18   0523   GLU  100  114*   NA  139  140   K  3.8  3.6   CL  108  106   CO2  26  26   BUN  12  7   CREATININE  0.8  0.8   CALCIUM  7.9*  8.6*   MG   --   2.0     CBC (Last 3 Results):   Recent Labs   Lab  09/06/18   0810  09/07/18   0522   WBC  8.47  10.68   RBC  3.88*  3.96*   HGB  11.5*  11.7*   HCT  35.2*  36.4*   PLT  165  180   MCV  91  92   MCH  29.6  29.5   MCHC  32.7  32.1       Significant Diagnostics:  None

## 2018-09-07 NOTE — PROGRESS NOTES
[facility]  Colorectal Surgery  Progress Note    Patient Name: Jenni Prescott Jr.  MRN: [id number]  Admission Date: 9/5/2018  Hospital Length of Stay: 2 days  Attending Physician: Papa Lopez MD    Subjective:     Interval History: no acute events. Passing flatus. Pain controlled. Tolerating clears.    Post-Op Info:  Procedure(s) (LRB):  COLECTOMY, TOTAL, LAPAROSCOPIC - lap total (Left)  Insertion-Catheter (Left)   2 Days Post-Op      Medications:  Continuous Infusions:   dextrose 5 % and 0.45 % NaCl with KCl 20 mEq 50 mL/hr at 09/07/18 1046    glucagon (human recombinant)       Scheduled Meds:   acetaminophen  1,000 mg Intravenous Q8H    alvimopan  12 mg Oral BID    atorvastatin  20 mg Oral Daily    enoxparin  40 mg Subcutaneous Q24H    gabapentin  300 mg Oral TID    ibuprofen  800 mg Intravenous Q8H    lisinopril-hydrochlorothiazide  1 tablet Oral QAM    pantoprazole  40 mg Oral BID     PRN Meds:   dextrose 50%    dextrose 50%    dextrose 50%    gabapentin    glucagon (human recombinant)    glucose    glucose    glucose    HYDROmorphone    naloxone    ondansetron    oxyCODONE    oxyCODONE    promethazine (PHENERGAN) IVPB        Objective:     Vital Signs (Most Recent):  Temp: 99.7 °F (37.6 °C) (09/07/18 1104)  Pulse: 63 (09/07/18 1104)  Resp: 18 (09/07/18 1104)  BP: (!) 146/86 (09/07/18 1104)  SpO2: 95 % (09/07/18 1104) Vital Signs (24h Range):  Temp:  [97.8 °F (36.6 °C)-99.7 °F (37.6 °C)] 99.7 °F (37.6 °C)  Pulse:  [50-69] 63  Resp:  [14-18] 18  SpO2:  [94 %-96 %] 95 %  BP: (133-155)/(74-88) 146/86     Intake/Output - Last 3 Shifts       09/05 0700 - 09/06 0659 09/06 0700 - 09/07 0659 09/07 0700 - 09/08 0659    P.O. 150      I.V. (mL/kg) 4258.3 (44.7) 716.7 (7.5) 299.2 (3.1)    IV Piggyback 450 750 250    Total Intake(mL/kg) 4858.3 (51) 1466.7 (15.4) 549.2 (5.8)    Urine (mL/kg/hr) 2275 (1) 3500 (1.5) 300 (0.7)    Emesis/NG output 0      Other 0      Stool 0       Blood 300      Total Output 2575 3500 300    Net +2283.3 -2033.3 +249.2           Stool Occurrence 0 x      Emesis Occurrence 0 x            Physical Exam   Constitutional: He is oriented to person, place, and time. He appears well-developed and well-nourished. No distress.   HENT:   Head: Normocephalic and atraumatic.   Eyes: Conjunctivae are normal.   Neck: Normal range of motion. Neck supple.   Cardiovascular: Normal rate and regular rhythm.   Pulmonary/Chest: Effort normal. No respiratory distress.   Abdominal: Soft. He exhibits no distension and no mass. There is tenderness (incisional). There is no guarding. A hernia (inguinal) is present.   Neurological: He is alert and oriented to person, place, and time.   Skin: Skin is warm and dry. He is not diaphoretic.     Significant Labs:  BMP (Last 3 Results):   Recent Labs   Lab  09/06/18   0810  09/07/18   0523   GLU  100  114*   NA  139  140   K  3.8  3.6   CL  108  106   CO2  26  26   BUN  12  7   CREATININE  0.8  0.8   CALCIUM  7.9*  8.6*   MG   --   2.0     CBC (Last 3 Results):   Recent Labs   Lab  09/06/18   0810  09/07/18   0522   WBC  8.47  10.68   RBC  3.88*  3.96*   HGB  11.5*  11.7*   HCT  35.2*  36.4*   PLT  165  180   MCV  91  92   MCH  29.6  29.5   MCHC  32.7  32.1       Significant Diagnostics:  None    Assessment/Plan:     * Adenomatous polyp    2 Days Post-Op total abdominal colectomy with ileorectal anastomosis, doing well    Low res diet  DC case  Multimodal pain control, PO pain meds  OOB/ambulate  Incentive spirometry  Home meds              Patel Barney MD  Colorectal Surgery  [facility]    Have seen and examined the patient with the fellow and agree with their plan.  AMARI SHERMAN

## 2018-09-07 NOTE — ASSESSMENT & PLAN NOTE
2 Days Post-Op total abdominal colectomy with ileorectal anastomosis, doing well    Low res diet  DC case  Multimodal pain control, PO pain meds  OOB/ambulate  Incentive spirometry  Home meds

## 2018-09-07 NOTE — PLAN OF CARE
Problem: Patient Care Overview  Goal: Plan of Care Review  Outcome: Revised  POC reviewed with patient and spouse who verbalized understanding. VSS on room air, occasionally bradycardia. AAOX4. Remains free of falls and injury. Patient ambulated hallways numerous times today. Patient sat in chair today.    Lap sites X 3 and transverse incision MAURO with dermabond clean, dry and intact. Patient started complaining of RLQ dull/cramping pain - Dr. Barney aware and not worried. Patient having numerous bouts of diarrhea (loose stools - slightly becoming more formed) - Dr. Barney aware and not worried.  Scrotal strap in place and ordered a new one today. Isaac removed and patient has been voiding.     IVF infusing per MAR. Tolerating low fiber diet, denies nausea. Pain controlled with PRN medications per MAR. Telemetry d/c today. Educated on IS use. Patient denies chest pain & SOB. TEDS and SCDS in place. No acute events. No distress noted. Bed in lowest position, call light within reach, frequent rounds made for safety.     WCTM.

## 2018-09-07 NOTE — PLAN OF CARE
Problem: Patient Care Overview  Goal: Plan of Care Review  Pain controlled with minimal pain meds, passed gas, ambulated in hallway x3, tolerated clear liq diet, and no nausea. Isaac still in placem urine is yellow clear

## 2018-09-08 ENCOUNTER — NURSE TRIAGE (OUTPATIENT)
Dept: ADMINISTRATIVE | Facility: CLINIC | Age: 58
End: 2018-09-08

## 2018-09-08 VITALS
SYSTOLIC BLOOD PRESSURE: 132 MMHG | DIASTOLIC BLOOD PRESSURE: 79 MMHG | TEMPERATURE: 98 F | HEIGHT: 70 IN | WEIGHT: 210 LBS | BODY MASS INDEX: 30.06 KG/M2 | RESPIRATION RATE: 18 BRPM | OXYGEN SATURATION: 98 % | HEART RATE: 63 BPM

## 2018-09-08 LAB — PHOSPHATE SERPL-MCNC: 2.7 MG/DL

## 2018-09-08 PROCEDURE — 36415 COLL VENOUS BLD VENIPUNCTURE: CPT

## 2018-09-08 PROCEDURE — 84100 ASSAY OF PHOSPHORUS: CPT

## 2018-09-08 PROCEDURE — 63600175 PHARM REV CODE 636 W HCPCS: Performed by: STUDENT IN AN ORGANIZED HEALTH CARE EDUCATION/TRAINING PROGRAM

## 2018-09-08 PROCEDURE — 25000003 PHARM REV CODE 250: Performed by: STUDENT IN AN ORGANIZED HEALTH CARE EDUCATION/TRAINING PROGRAM

## 2018-09-08 PROCEDURE — 25000003 PHARM REV CODE 250: Performed by: NURSE PRACTITIONER

## 2018-09-08 PROCEDURE — 63600175 PHARM REV CODE 636 W HCPCS: Performed by: NURSE PRACTITIONER

## 2018-09-08 RX ORDER — METRONIDAZOLE 500 MG/1
500 TABLET ORAL SEE ADMIN INSTRUCTIONS
Qty: 3 TABLET | Refills: 0 | Status: SHIPPED | OUTPATIENT
Start: 2018-09-08 | End: 2018-09-20

## 2018-09-08 RX ORDER — OXYCODONE AND ACETAMINOPHEN 5; 325 MG/1; MG/1
1 TABLET ORAL EVERY 6 HOURS PRN
Qty: 21 TABLET | Refills: 0 | Status: SHIPPED | OUTPATIENT
Start: 2018-09-08 | End: 2018-09-20

## 2018-09-08 RX ORDER — NEOMYCIN SULFATE 500 MG/1
500 TABLET ORAL SEE ADMIN INSTRUCTIONS
Qty: 6 TABLET | Refills: 0 | Status: SHIPPED | OUTPATIENT
Start: 2018-09-08 | End: 2018-09-20

## 2018-09-08 RX ORDER — PANTOPRAZOLE SODIUM 40 MG/1
40 TABLET, DELAYED RELEASE ORAL 2 TIMES DAILY
Qty: 60 TABLET | Refills: 2 | Status: SHIPPED | OUTPATIENT
Start: 2018-09-08 | End: 2018-10-22 | Stop reason: SDUPTHER

## 2018-09-08 RX ADMIN — LISINOPRIL AND HYDROCHLOROTHIAZIDE 1 TABLET: 12.5; 1 TABLET ORAL at 06:09

## 2018-09-08 RX ADMIN — ACETAMINOPHEN 1000 MG: 10 INJECTION, SOLUTION INTRAVENOUS at 06:09

## 2018-09-08 RX ADMIN — HYDROMORPHONE HYDROCHLORIDE 1 MG: 2 INJECTION INTRAMUSCULAR; INTRAVENOUS; SUBCUTANEOUS at 03:09

## 2018-09-08 RX ADMIN — OXYCODONE HYDROCHLORIDE 10 MG: 5 TABLET ORAL at 10:09

## 2018-09-08 RX ADMIN — GABAPENTIN 300 MG: 300 CAPSULE ORAL at 10:09

## 2018-09-08 RX ADMIN — ATORVASTATIN CALCIUM 20 MG: 20 TABLET, FILM COATED ORAL at 10:09

## 2018-09-08 RX ADMIN — DEXTROSE MONOHYDRATE, SODIUM CHLORIDE, AND POTASSIUM CHLORIDE: 50; 4.5; 1.49 INJECTION, SOLUTION INTRAVENOUS at 10:09

## 2018-09-08 RX ADMIN — PANTOPRAZOLE SODIUM 40 MG: 40 TABLET, DELAYED RELEASE ORAL at 10:09

## 2018-09-08 RX ADMIN — ALVIMOPAN 12 MG: 12 CAPSULE ORAL at 10:09

## 2018-09-08 RX ADMIN — IBUPROFEN 800 MG: 800 INJECTION INTRAVENOUS at 06:09

## 2018-09-08 NOTE — ASSESSMENT & PLAN NOTE
58 y.o. male 3 Days Post-Op for Procedure(s) (LRB):  COLECTOMY, TOTAL, LAPAROSCOPIC - lap total (Left)  Insertion-Catheter (Left)    Low res diet  DC case  Multimodal pain control, PO pain meds  OOB/ambulate  Incentive spirometry  Home meds     Dispo: Will discharge this afternoon if BM becoming fewer in quantity and more solid.

## 2018-09-08 NOTE — PLAN OF CARE
Problem: Patient Care Overview  Goal: Plan of Care Review  Outcome: Ongoing (interventions implemented as appropriate)  POC reviewed with patient and spouse. Patient and verbalized understanding. VSS on RA, occasional bradycardia. Afebrile. AAO x 4. Pt up to bathroom independently, voiding adequately. Patient on LFLR diet, tolerating well, no N/V/D. Patient receiving adequate relief with prn pain medication per MD mar. Patient IVF remained continuously throughout shift. Patient remained free of any falls or acute events. No signs of distress, SR up x 2, bed low and locked. RN will continue to monitor.

## 2018-09-08 NOTE — SUBJECTIVE & OBJECTIVE
Interval History: NAEON, + BM x 10 (diarrhea), tolerate food, pain controlled.    Medications:  Continuous Infusions:   dextrose 5 % and 0.45 % NaCl with KCl 20 mEq 50 mL/hr at 09/08/18 1059    glucagon (human recombinant)       Scheduled Meds:   alvimopan  12 mg Oral BID    atorvastatin  20 mg Oral Daily    enoxparin  40 mg Subcutaneous Q24H    gabapentin  300 mg Oral TID    ibuprofen  800 mg Intravenous Q8H    lisinopril-hydrochlorothiazide  1 tablet Oral QAM    pantoprazole  40 mg Oral BID     PRN Meds:dextrose 50%, dextrose 50%, dextrose 50%, gabapentin, glucagon (human recombinant), glucose, glucose, glucose, HYDROmorphone, naloxone, ondansetron, oxyCODONE, oxyCODONE, promethazine (PHENERGAN) IVPB     Review of patient's allergies indicates:   Allergen Reactions    Codeine      Itching with Codeine , Oxycodone     Oxycodone      Itching      Objective:     Vital Signs (Most Recent):  Temp: 97.7 °F (36.5 °C) (09/08/18 0856)  Pulse: 63 (09/08/18 0856)  Resp: 18 (09/08/18 0856)  BP: 132/79 (09/08/18 0856)  SpO2: 98 % (09/08/18 0856) Vital Signs (24h Range):  Temp:  [97.7 °F (36.5 °C)-99.5 °F (37.5 °C)] 97.7 °F (36.5 °C)  Pulse:  [55-70] 63  Resp:  [12-20] 18  SpO2:  [92 %-98 %] 98 %  BP: (115-132)/(64-79) 132/79     Weight: 95.3 kg (210 lb)  Body mass index is 30.13 kg/m².    Intake/Output - Last 3 Shifts       09/06 0700 - 09/07 0659 09/07 0700 - 09/08 0659 09/08 0700 - 09/09 0659    P.O.  800     I.V. (mL/kg) 716.7 (7.5) 1349.2 (14.2)     IV Piggyback 750 950     Total Intake(mL/kg) 1466.7 (15.4) 3099.2 (32.5)     Urine (mL/kg/hr) 3500 (1.5) 2580 (1.1)     Emesis/NG output       Other       Stool  0     Blood       Total Output 3500 2580     Net -2033.3 +519.2            Urine Occurrence   2 x    Stool Occurrence  15 x           Physical Exam   Constitutional: He is oriented to person, place, and time. He appears well-developed and well-nourished. No distress.   HENT:   Head: Normocephalic and  atraumatic.   Eyes: EOM are normal.   Neck: Normal range of motion. Neck supple.   Cardiovascular: Normal rate.   Pulmonary/Chest: Effort normal. No respiratory distress.   Abdominal: Soft. He exhibits no distension. There is no tenderness.   Incisions c/d/i   Neurological: He is alert and oriented to person, place, and time.   Skin: Skin is warm and dry.   Psychiatric: He has a normal mood and affect. Thought content normal.       Significant Labs:  CBC:   Recent Labs   Lab  09/07/18   0522   WBC  10.68   RBC  3.96*   HGB  11.7*   HCT  36.4*   PLT  180   MCV  92   MCH  29.5   MCHC  32.1     BMP:   Recent Labs   Lab  09/07/18   0523   GLU  114*   NA  140   K  3.6   CL  106   CO2  26   BUN  7   CREATININE  0.8   CALCIUM  8.6*   MG  2.0       Significant Diagnostics:  I have reviewed all pertinent imaging results/findings within the past 24 hours.

## 2018-09-08 NOTE — DISCHARGE SUMMARY
[facility]  General Surgery  Discharge Summary      Patient Name: Jenni Prescott Jr.  MRN: [id number]  Admission Date: 9/5/2018  Hospital Length of Stay: 3 days  Discharge Date and Time:  09/08/2018   Attending Physician: Papa Lopez MD   Discharging Provider: Kwadwo Thompson MD  Primary Care Provider: Radha Antoine MD     HPI:     Patient is a 57 y.o. male presents with with a history of multiple colonic polyps.  These are adenomatous polyps.  He has had multiple colonoscopies with 100s of polyps seen and biopsied on colonoscopy some adenomatous some hyperplastic.  He is here for discussion of surgery.  He denies any change in his bowel habits no nausea vomiting no abdominal pain.  There is no family history of polyposis or polyposis syndromes.  Or HNP cc associated cancers.2. He underwent a recent flex sig to identify if there was rectal sparing. He was found to have a large polyp burden but they were all hyperplastic have had him perform Buccal swab genetic testing        Procedure(s) (LRB):  COLECTOMY, TOTAL, LAPAROSCOPIC - lap total (Left)  Insertion-Catheter (Left)     Hospital Course:   09/05 - surgery, placed on total abd colectomy pathway  09/06 - CLD, Case in place, OOB, multimodal pain control  09/07 - Low res diet, d/c case  09/08 - Pain controlled, ambulating, return of bowel function, tolerating food    Consults:   Consults (From admission, onward)        Status Ordering Provider     Inpatient consult to Social Work/Case Management  Once     Provider:  (Not yet assigned)    Acknowledged JESSIE MOODY          Significant Diagnostic Studies: 09/05 specimen sent to pathology. Results pending.    Pending Diagnostic Studies:     None        Final Active Diagnoses:    Diagnosis Date Noted POA    PRINCIPAL PROBLEM:  Adenomatous polyp [D36.9] 09/05/2018 Yes      Problems Resolved During this Admission:      Discharged Condition: good    Disposition:      Patient  responded better than expected to surgery. He is being discharged after completing all milestones on POD3. He will follow up in clinic in 2 weeks.    Follow Up:  Follow-up Information     Call Radha Antoine MD.    Specialty:  Internal Medicine  Why:  As needed  Contact information:  [address] [address]  [phone]             Papa Lopez MD.    Specialty:  Colon and Rectal Surgery  Why:  Message left in [facility] for Post-op appt with Dr Lopez - patient will be contacted with appt date & time.  Contact information:  [address]  [place] [address]  [phone]                 Patient Instructions:      Diet Adult Regular     Lifting restrictions   Order Comments: May not lift more than 10 lbs for 4 weeks from day of surgery.  No pushing/pulling such as vacuuming or raking.  No straining, avoid constipation, and take stool softeners as described and laxatives as needed.  No driving while on narcotics and until you can react quickly without pain.     Notify your health care provider if you experience any of the following:  temperature >100.4     Notify your health care provider if you experience any of the following:  persistent nausea and vomiting or diarrhea     Notify your health care provider if you experience any of the following:  severe uncontrolled pain     Notify your health care provider if you experience any of the following:  redness, tenderness, or signs of infection (pain, swelling, redness, odor or green/yellow discharge around incision site)     Notify your health care provider if you experience any of the following:  difficulty breathing or increased cough     Notify your health care provider if you experience any of the following:  severe persistent headache     Notify your health care provider if you experience any of the following:  worsening rash     Notify your health care provider if you experience any of the following:  persistent dizziness,  light-headedness, or visual disturbances     Notify your health care provider if you experience any of the following:  increased confusion or weakness     No dressing needed   Scheduling Instructions: OK to shower. Do not bath or soak in tub or pool etc until follow up visit.     Activity as tolerated     Medications:  Reconciled Home Medications:      Medication List      START taking these medications    oxyCODONE-acetaminophen 5-325 mg per tablet  Commonly known as:  PERCOCET  Take 1 tablet by mouth every 6 (six) hours as needed for Pain.        CHANGE how you take these medications    atorvastatin 20 MG tablet  Commonly known as:  LIPITOR  Take 1 tablet (20 mg total) by mouth once daily.  What changed:  when to take this     lisinopril-hydrochlorothiazide 20-12.5 mg per tablet  Commonly known as:  PRINZIDE,ZESTORETIC  Take one tablet every day for elevated blood pressure  What changed:    · how much to take  · how to take this  · when to take this  · additional instructions        CONTINUE taking these medications    metroNIDAZOLE 500 MG tablet  Commonly known as:  FLAGYL  Take 1 tablet (500 mg total) by mouth As instructed. Take one pill at 1pm, one pill 2pm, one pill at 11pm     neomycin 500 mg Tab  Commonly known as:  MYCIFRADIN  Take 1 tablet (500 mg total) by mouth As instructed. Take 2 pills at 1pm, 2 pills at 2pm, and 2 pills at 11pm     pantoprazole 40 MG tablet  Commonly known as:  PROTONIX  Take 1 tablet (40 mg total) by mouth 2 (two) times daily.            Kwadwo Thompson MD  General Surgery  [facility]

## 2018-09-08 NOTE — TELEPHONE ENCOUNTER
"  Reason for Disposition   Caller has medication question only, adult not sick, and triager answers question    Answer Assessment - Initial Assessment Questions  1. SYMPTOMS: "Do you have any symptoms?"      Patient calling to see if rx for oxycodone was sent to pharmacy. Advised patient that it has been sent to the pharmacy but they have not confirmed that they received it yet. Patient advised to call back if order is not received by pharmacy today. Patient still showing has hospital admitted patient.  2. SEVERITY: If symptoms are present, ask "Are they mild, moderate or severe?"      n/a    Protocols used: ST MEDICATION QUESTION CALL-A-    "

## 2018-09-08 NOTE — PROGRESS NOTES
[facility]  General Surgery  Progress Note    Subjective:     History of Present Illness:  No notes on file    Post-Op Info:  Procedure(s) (LRB):  COLECTOMY, TOTAL, LAPAROSCOPIC - lap total (Left)  Insertion-Catheter (Left)   3 Days Post-Op     Interval History: NAEON, + BM x 10 (diarrhea), tolerate food, pain controlled.    Medications:  Continuous Infusions:   dextrose 5 % and 0.45 % NaCl with KCl 20 mEq 50 mL/hr at 09/08/18 1059    glucagon (human recombinant)       Scheduled Meds:   alvimopan  12 mg Oral BID    atorvastatin  20 mg Oral Daily    enoxparin  40 mg Subcutaneous Q24H    gabapentin  300 mg Oral TID    ibuprofen  800 mg Intravenous Q8H    lisinopril-hydrochlorothiazide  1 tablet Oral QAM    pantoprazole  40 mg Oral BID     PRN Meds:dextrose 50%, dextrose 50%, dextrose 50%, gabapentin, glucagon (human recombinant), glucose, glucose, glucose, HYDROmorphone, naloxone, ondansetron, oxyCODONE, oxyCODONE, promethazine (PHENERGAN) IVPB     Review of patient's allergies indicates:   Allergen Reactions    Codeine      Itching with Codeine , Oxycodone     Oxycodone      Itching      Objective:     Vital Signs (Most Recent):  Temp: 97.7 °F (36.5 °C) (09/08/18 0856)  Pulse: 63 (09/08/18 0856)  Resp: 18 (09/08/18 0856)  BP: 132/79 (09/08/18 0856)  SpO2: 98 % (09/08/18 0856) Vital Signs (24h Range):  Temp:  [97.7 °F (36.5 °C)-99.5 °F (37.5 °C)] 97.7 °F (36.5 °C)  Pulse:  [55-70] 63  Resp:  [12-20] 18  SpO2:  [92 %-98 %] 98 %  BP: (115-132)/(64-79) 132/79     Weight: 95.3 kg (210 lb)  Body mass index is 30.13 kg/m².    Intake/Output - Last 3 Shifts       09/06 0700 - 09/07 0659 09/07 0700 - 09/08 0659 09/08 0700 - 09/09 0659    P.O.  800     I.V. (mL/kg) 716.7 (7.5) 1349.2 (14.2)     IV Piggyback 750 950     Total Intake(mL/kg) 1466.7 (15.4) 3099.2 (32.5)     Urine (mL/kg/hr) 3500 (1.5) 2580 (1.1)     Emesis/NG output       Other       Stool  0     Blood       Total Output 3500 2580      Net -2033.3 +519.2            Urine Occurrence   2 x    Stool Occurrence  15 x           Physical Exam   Constitutional: He is oriented to person, place, and time. He appears well-developed and well-nourished. No distress.   HENT:   Head: Normocephalic and atraumatic.   Eyes: EOM are normal.   Neck: Normal range of motion. Neck supple.   Cardiovascular: Normal rate.   Pulmonary/Chest: Effort normal. No respiratory distress.   Abdominal: Soft. He exhibits no distension. There is no tenderness.   Incisions c/d/i   Neurological: He is alert and oriented to person, place, and time.   Skin: Skin is warm and dry.   Psychiatric: He has a normal mood and affect. Thought content normal.       Significant Labs:  CBC:   Recent Labs   Lab  09/07/18   0522   WBC  10.68   RBC  3.96*   HGB  11.7*   HCT  36.4*   PLT  180   MCV  92   MCH  29.5   MCHC  32.1     BMP:   Recent Labs   Lab  09/07/18   0523   GLU  114*   NA  140   K  3.6   CL  106   CO2  26   BUN  7   CREATININE  0.8   CALCIUM  8.6*   MG  2.0       Significant Diagnostics:  I have reviewed all pertinent imaging results/findings within the past 24 hours.    Assessment/Plan:     * Adenomatous polyp    58 y.o. male 3 Days Post-Op for Procedure(s) (LRB):  COLECTOMY, TOTAL, LAPAROSCOPIC - lap total (Left)  Insertion-Catheter (Left)    Low res diet  DC case  Multimodal pain control, PO pain meds  OOB/ambulate  Incentive spirometry  Home meds     Dispo: Will discharge this afternoon if BM becoming fewer in quantity and more solid.            Kwadwo Thompson MD  General Surgery  [facility]\  Have seen and examined the patient with the fellow and agree with their plan.  AMARI SHERMAN

## 2018-09-10 ENCOUNTER — TELEPHONE (OUTPATIENT)
Dept: SURGERY | Facility: CLINIC | Age: 58
End: 2018-09-10

## 2018-09-10 NOTE — TELEPHONE ENCOUNTER
----- Message from Dima Gonzalez sent at 9/10/2018  2:35 PM CDT -----  Contact: Pt wife:[phone] or [phone]  .Needs Advice    Reason for call:Pt wife called and states she would like to speak with the nurse in regards to some post op question she has.       Communication Preference:Pt wife:[phone] or [phone]  Additional Information:

## 2018-09-10 NOTE — TELEPHONE ENCOUNTER
----- Message from Angelia Dowling sent at 9/10/2018  8:37 AM CDT -----  Contact: Liset, wife, [phone]  Needs Advice    Reason for call: Pt wife called stating she have a question about husbands surgery   Communication Preference: Liset wife, [phone]  Additional Information:

## 2018-09-10 NOTE — TELEPHONE ENCOUNTER
Spoke with patient and informed him that he should change positions while sleeping and not just sleep on his back.

## 2018-09-20 ENCOUNTER — OFFICE VISIT (OUTPATIENT)
Dept: SURGERY | Facility: CLINIC | Age: 58
End: 2018-09-20
Payer: COMMERCIAL

## 2018-09-20 VITALS
WEIGHT: 201.06 LBS | DIASTOLIC BLOOD PRESSURE: 73 MMHG | HEIGHT: 70 IN | HEART RATE: 73 BPM | BODY MASS INDEX: 28.78 KG/M2 | SYSTOLIC BLOOD PRESSURE: 98 MMHG

## 2018-09-20 DIAGNOSIS — Z98.890 POSTOPERATIVE STATE: Primary | ICD-10-CM

## 2018-09-20 PROCEDURE — 99999 PR PBB SHADOW E&M-EST. PATIENT-LVL III: CPT | Mod: PBBFAC,,, | Performed by: COLON & RECTAL SURGERY

## 2018-09-20 PROCEDURE — 99024 POSTOP FOLLOW-UP VISIT: CPT | Mod: S$GLB,,, | Performed by: COLON & RECTAL SURGERY

## 2018-09-20 RX ORDER — LISINOPRIL AND HYDROCHLOROTHIAZIDE 12.5; 2 MG/1; MG/1
1 TABLET ORAL DAILY
COMMUNITY
End: 2018-10-22 | Stop reason: SDUPTHER

## 2018-09-20 NOTE — PROGRESS NOTES
CRS Post-operative visit    Visit Info:     DATE OF PROCEDURE:  09/05/2018.     PREOPERATIVE DIAGNOSIS:  Polyposis.     POSTOPERATIVE DIAGNOSIS:  Polyposis.     PROCEDURE:  Total abdominal colectomy, ileorectostomy.     SURGEON:  Papa Lopez M.D.     RESIDENT:  Dr. Barney.     ANESTHESIA:  General endotracheal.     INDICATION:  Mr. Prescott is a 58-year-old male with known polyposis, here for   total abdominal colectomy and ileorectostomy    FINAL PATHOLOGIC DIAGNOSIS  1. Abdominal colon, appendix, and terminal ileum, total abdominal colectomy:  - Tubular adenoma, multiple.  - Hyperplastic polyp, multiple.  - Negative for high-grade dysplasia and malignancy on sections examined.  - Surgical margins viable, negative for dysplasia and malignancy.  - Fourteen benign lymph nodes.  - Appendix with no significant histologic abnormality.  - See comment.  2. Colon and terminal ileum, proximal and distal donuts, biopsy:  - Colon with no significant histologic abnormality.  - Small bowel with no significant histologic abnormality.  - Negative for dysplasia and malignancy.  COMMENT: The: (Specimen 1) shows numerous polyp scattered throughout the colon ranging in size from 0.1-0.5  cm in greatest dimension. There is no larger predominant lesions identified. There are no lesions noted grossly  more concerning than the others. Numerous of the polyps are sampled and show multiple tubular adenomas and  multiple hyperplastic polyps, negative for high-grade dysplasia and malignancy.    Current Status: Doing well postoperatively.  His stools are firming up his pain is improving significantly    Physical Exam:  General: White male in NAD sitting in chair in clinic  Neuro: aaox4 maex4 perrl  Respiratory: resps even unlabored  Cardiac: cap refill <2 sec  Abdomen: Normal, benign. Incisions:clean, dry, intact        Assessment and Plan:  Jenni was seen today for post-op evaluation.    Diagnoses and all orders for this  visit:    Postoperative state    may increase diet and activity, still minimize lifting  RTC 4 weeks

## 2018-10-22 ENCOUNTER — LAB VISIT (OUTPATIENT)
Dept: LAB | Facility: HOSPITAL | Age: 58
End: 2018-10-22
Attending: INTERNAL MEDICINE
Payer: COMMERCIAL

## 2018-10-22 ENCOUNTER — OFFICE VISIT (OUTPATIENT)
Dept: INTERNAL MEDICINE | Facility: CLINIC | Age: 58
End: 2018-10-22
Payer: COMMERCIAL

## 2018-10-22 VITALS
HEIGHT: 70 IN | SYSTOLIC BLOOD PRESSURE: 110 MMHG | DIASTOLIC BLOOD PRESSURE: 82 MMHG | TEMPERATURE: 98 F | WEIGHT: 204.13 LBS | OXYGEN SATURATION: 97 % | HEART RATE: 71 BPM | BODY MASS INDEX: 29.22 KG/M2

## 2018-10-22 DIAGNOSIS — Z87.898 HISTORY OF PREDIABETES: ICD-10-CM

## 2018-10-22 DIAGNOSIS — K21.9 GASTROESOPHAGEAL REFLUX DISEASE, ESOPHAGITIS PRESENCE NOT SPECIFIED: ICD-10-CM

## 2018-10-22 DIAGNOSIS — E78.5 HYPERLIPIDEMIA LDL GOAL <100: ICD-10-CM

## 2018-10-22 DIAGNOSIS — I10 ESSENTIAL HYPERTENSION: Primary | ICD-10-CM

## 2018-10-22 LAB
CHOLEST SERPL-MCNC: 95 MG/DL
CHOLEST/HDLC SERPL: 2.4 {RATIO}
HDLC SERPL-MCNC: 40 MG/DL
HDLC SERPL: 42.1 %
LDLC SERPL CALC-MCNC: 32.4 MG/DL
NONHDLC SERPL-MCNC: 55 MG/DL
TRIGL SERPL-MCNC: 113 MG/DL

## 2018-10-22 PROCEDURE — 99214 OFFICE O/P EST MOD 30 MIN: CPT | Mod: S$GLB,,, | Performed by: INTERNAL MEDICINE

## 2018-10-22 PROCEDURE — 36415 COLL VENOUS BLD VENIPUNCTURE: CPT | Mod: PO

## 2018-10-22 PROCEDURE — 3074F SYST BP LT 130 MM HG: CPT | Mod: CPTII,S$GLB,, | Performed by: INTERNAL MEDICINE

## 2018-10-22 PROCEDURE — 3079F DIAST BP 80-89 MM HG: CPT | Mod: CPTII,S$GLB,, | Performed by: INTERNAL MEDICINE

## 2018-10-22 PROCEDURE — 80061 LIPID PANEL: CPT

## 2018-10-22 PROCEDURE — 99999 PR PBB SHADOW E&M-EST. PATIENT-LVL III: CPT | Mod: PBBFAC,,, | Performed by: INTERNAL MEDICINE

## 2018-10-22 PROCEDURE — 3008F BODY MASS INDEX DOCD: CPT | Mod: CPTII,S$GLB,, | Performed by: INTERNAL MEDICINE

## 2018-10-22 RX ORDER — LISINOPRIL AND HYDROCHLOROTHIAZIDE 12.5; 2 MG/1; MG/1
1 TABLET ORAL DAILY
Qty: 90 TABLET | Refills: 3 | Status: SHIPPED | OUTPATIENT
Start: 2018-10-22 | End: 2020-03-26 | Stop reason: SDUPTHER

## 2018-10-22 RX ORDER — ATORVASTATIN CALCIUM 20 MG/1
20 TABLET, FILM COATED ORAL DAILY
Qty: 90 TABLET | Refills: 3 | Status: SHIPPED | OUTPATIENT
Start: 2018-10-22 | End: 2020-03-26 | Stop reason: SDUPTHER

## 2018-10-22 RX ORDER — PANTOPRAZOLE SODIUM 40 MG/1
40 TABLET, DELAYED RELEASE ORAL DAILY
Qty: 30 TABLET | Refills: 1 | Status: SHIPPED | OUTPATIENT
Start: 2018-10-22 | End: 2019-07-08 | Stop reason: SDUPTHER

## 2018-10-22 NOTE — PROGRESS NOTES
Subjective:       Patient ID: Jenni Prescott Jr. is a 58 y.o. male.    Chief Complaint: Medication Refill    HPI Mr. Prescott is a 58-year-old male with hypertension, GERD, hyperlipidemia, and history prediabetes who presents for check up of these medical issues and medication refill.  Of note after his last visit with me 1 year ago, he was sent to Gastroenterology for screening colonoscopy.  He was found to have numerous polyps, some of which were pre cancerous.  He has had a total abdominal colectomy and ileorectostomy as a result.  He continues to take lisinopril-hydrochlorothiazide for treatment of essential hypertension.  He takes atorvastatin for treatment of hyperlipidemia.  He is taking pantoprazole which was started for treatment of H pylori.  He has been taking pantoprazole twice daily since treatment for H pylori which occurred approximately 1-2 years ago.  He is doing well. He is feeling fine today and has no acute complaints.     Review of Systems   All other systems reviewed and are negative.      Objective:      Physical Exam   Constitutional: He is oriented to person, place, and time. He appears well-developed and well-nourished. No distress.   HENT:   Head: Normocephalic and atraumatic.   Eyes: Conjunctivae and EOM are normal.   Cardiovascular: Normal rate, regular rhythm, normal heart sounds and intact distal pulses. Exam reveals no gallop and no friction rub.   No murmur heard.  Pulmonary/Chest: Effort normal and breath sounds normal. No stridor. No respiratory distress. He has no wheezes. He has no rales.   Neurological: He is alert and oriented to person, place, and time.   Skin: Skin is warm and dry. He is not diaphoretic.   Psychiatric: He has a normal mood and affect. His behavior is normal. Judgment and thought content normal.   Vitals reviewed.      Assessment:       1. Essential hypertension    2. History of prediabetes    3. Gastroesophageal reflux disease, esophagitis presence not  specified    4. Hyperlipidemia LDL goal <100        Plan:     1.  Essential hypertension  Stable, well controlled.  Continue current medications    2.  History of pre diabetes  Last A1c within normal range.  Continue to monitor yearly    3.  GERD  Patient is not having any symptoms of acid reflux at this time.  He has completed his treatment for H pylori.  Discussed weaning of Protonix with patient with ultimate goal of cessation at some point in the future.  Patient will now decrease from b.i.d. dosing to once daily dosing.  He will do once daily dosing for 1 month and then attempt discontinue it at that time.    4.  Hyperlipidemia  Checking lipids today  Continue atorvastatin    Return to clinic in 1 year and as needed.

## 2018-10-30 ENCOUNTER — OFFICE VISIT (OUTPATIENT)
Dept: SURGERY | Facility: CLINIC | Age: 58
End: 2018-10-30
Payer: COMMERCIAL

## 2018-10-30 VITALS
BODY MASS INDEX: 29.48 KG/M2 | WEIGHT: 205.94 LBS | HEIGHT: 70 IN | HEART RATE: 72 BPM | SYSTOLIC BLOOD PRESSURE: 119 MMHG | DIASTOLIC BLOOD PRESSURE: 79 MMHG

## 2018-10-30 DIAGNOSIS — Z98.890 POST-OPERATIVE STATE: Primary | ICD-10-CM

## 2018-10-30 PROCEDURE — 99999 PR PBB SHADOW E&M-EST. PATIENT-LVL III: CPT | Mod: PBBFAC,,, | Performed by: NURSE PRACTITIONER

## 2018-10-30 PROCEDURE — 99024 POSTOP FOLLOW-UP VISIT: CPT | Mod: S$GLB,,, | Performed by: NURSE PRACTITIONER

## 2018-10-30 NOTE — PROGRESS NOTES
CRS Post-operative visit    Visit Info:     DATE OF PROCEDURE:  09/05/2018.     PREOPERATIVE DIAGNOSIS:  Polyposis.     POSTOPERATIVE DIAGNOSIS:  Polyposis.     PROCEDURE:  Total abdominal colectomy, ileorectostomy.     SURGEON:  Papa Lopez M.D.     RESIDENT:  Dr. Barney.     ANESTHESIA:  General endotracheal.     INDICATION:  Mr. Prescott is a 58-year-old male with known polyposis, here for   total abdominal colectomy and ileorectostomy. Having 8-10 loose bowel movements/day. Tolerating diet. Back at work.     FINAL PATHOLOGIC DIAGNOSIS  1. Abdominal colon, appendix, and terminal ileum, total abdominal colectomy:  - Tubular adenoma, multiple.  - Hyperplastic polyp, multiple.  - Negative for high-grade dysplasia and malignancy on sections examined.  - Surgical margins viable, negative for dysplasia and malignancy.  - Fourteen benign lymph nodes.  - Appendix with no significant histologic abnormality.  - See comment.  2. Colon and terminal ileum, proximal and distal donuts, biopsy:  - Colon with no significant histologic abnormality.  - Small bowel with no significant histologic abnormality.  - Negative for dysplasia and malignancy.  COMMENT: The: (Specimen 1) shows numerous polyp scattered throughout the colon ranging in size from 0.1-0.5  cm in greatest dimension. There is no larger predominant lesions identified. There are no lesions noted grossly  more concerning than the others. Numerous of the polyps are sampled and show multiple tubular adenomas and  multiple hyperplastic polyps, negative for high-grade dysplasia and malignancy.    Current Status: Doing well postoperatively.  His stools are firming up his pain is improving significantly    Physical Exam:  General: White male in NAD sitting in chair in clinic  Neuro: aaox4 maex4 perrl  Respiratory: resps even unlabored  Cardiac: cap refill <2 sec  Abdomen: Normal, benign. Incisions:clean, dry, intact        Assessment and Plan:  Resume normal  activities  Okay to start low dose of imodium for nocturnal BMs  F/u 6 months for eval of rectum, flex sig

## 2018-11-19 ENCOUNTER — TELEPHONE (OUTPATIENT)
Dept: SURGERY | Facility: CLINIC | Age: 58
End: 2018-11-19

## 2018-11-19 NOTE — TELEPHONE ENCOUNTER
Spoke with patient and informed him that he should start taking metamucil 2 teaspoons a day.  He can also take simethicone for gas.

## 2018-11-19 NOTE — TELEPHONE ENCOUNTER
----- Message from Dima Gonzalez sent at 11/19/2018  9:01 AM CST -----  Contact: Pt:[phone]  .Needs Advice    Reason for call:Pt wife called and states she would like the nurse to call the pt. Pt wife states he is having issues with the imodium.          Communication Preference:Pt:[phone]    Additional Information:

## 2018-11-24 ENCOUNTER — NURSE TRIAGE (OUTPATIENT)
Dept: ADMINISTRATIVE | Facility: CLINIC | Age: 58
End: 2018-11-24

## 2018-11-24 NOTE — TELEPHONE ENCOUNTER
"Pt called re 18 hours feeling stomach upset vx3 since this am at 0100. Taking imodium qd- due to freq BMs. Started getting constipated, +nausea. No appetite. + flatus. +burping. Last BM none today - didn't eat well yest.     Reason for Disposition   [1] Follow-up call from patient regarding patient's clinical status AND [2] information urgent    Answer Assessment - Initial Assessment Questions  1. LOCATION: "Where does it hurt?"      abd pain around belly button. Reg uop, drinking fluids   No CP, no  SOB  2. RADIATION: "Does the pain shoot anywhere else?" (e.g., chest, back)     No   3. ONSET: "When did the pain begin?" (Minutes, hours or days ago)      yest   4. SUDDEN: "Gradual or sudden onset?"     Just with imodium   5. PATTERN "Does the pain come and go, or is it constant?"     - If constant: "Is it getting better, staying the same, or worsening?"       (Note: Constant means the pain never goes away completely; most serious pain is constant and it progresses)      - If intermittent: "How long does it last?" "Do you have pain now?"      (Note: Intermittent means the pain goes away completely between bouts)     Constant since yest - went away with vomiting.   6. SEVERITY: "How bad is the pain?"  (e.g., Scale 1-10; mild, moderate, or severe)     - MILD (1-3): doesn't interfere with normal activities, abdomen soft and not tender to touch      - MODERATE (4-7): interferes with normal activities or awakens from sleep, tender to touch      - SEVERE (8-10): excruciating pain, doubled over, unable to do any normal activities      5-8  7. RECURRENT SYMPTOM: "Have you ever had this type of abdominal pain before?" If so, ask: "When was the last time?" and "What happened that time?"      No   8. CAUSE: "What do you think is causing the abdominal pain?"     Constipated   9. RELIEVING/AGGRAVATING FACTORS: "What makes it better or worse?" (e.g., movement, antacids, bowel movement)      N/a   10. OTHER SYMPTOMS: "Has there " "been any vomiting, diarrhea, constipation, or urine problems?"       Vomiting    Protocols used: ST PCP CALL - NO TRIAGE-A-, ST ABDOMINAL PAIN - MALE-A-  pt postop colon surg with abd pain, nausea. rec pt speak with MD on call. pt transferred to speak with MD on call.     "

## 2018-11-27 ENCOUNTER — TELEPHONE (OUTPATIENT)
Dept: INTERNAL MEDICINE | Facility: CLINIC | Age: 58
End: 2018-11-27

## 2018-11-27 NOTE — TELEPHONE ENCOUNTER
Message from the office , dated 11/26   He called the office yesterday   Saw him for pre op evaluation on 9/4   I ordered ultra sound to check for elevated total bilirubin   I called and spoke to him this morning   He is having abdominal pain with eating , abdominal bloating , reduced appetite ,nausea  Has diarrhea for which he Imodium  that made him constipated  His symptoms sounds to be from his bowels   He wants to know , if a dietician can help - suggested that it might help   No jaundice , no fatoumata Coloured stool   Suggested follow up with CRS   Reschedule Ultra sound although , it may not find any cause to account for his symptoms

## 2018-11-28 ENCOUNTER — HOSPITAL ENCOUNTER (OUTPATIENT)
Dept: RADIOLOGY | Facility: HOSPITAL | Age: 58
Discharge: HOME | End: 2018-11-28
Attending: HOSPITALIST
Payer: COMMERCIAL

## 2018-11-28 DIAGNOSIS — R17 SERUM TOTAL BILIRUBIN ELEVATED: ICD-10-CM

## 2018-11-28 PROCEDURE — 76700 US EXAM ABDOM COMPLETE: CPT | Mod: 26,,, | Performed by: RADIOLOGY

## 2018-11-28 PROCEDURE — 76700 US EXAM ABDOM COMPLETE: CPT | Mod: TC

## 2018-12-10 ENCOUNTER — OFFICE VISIT (OUTPATIENT)
Dept: SURGERY | Facility: CLINIC | Age: 58
End: 2018-12-10
Payer: COMMERCIAL

## 2018-12-10 VITALS
BODY MASS INDEX: 28.88 KG/M2 | HEART RATE: 62 BPM | WEIGHT: 201.75 LBS | SYSTOLIC BLOOD PRESSURE: 144 MMHG | HEIGHT: 70 IN | DIASTOLIC BLOOD PRESSURE: 85 MMHG

## 2018-12-10 DIAGNOSIS — R19.7 DIARRHEA FOLLOWING GASTROINTESTINAL SURGERY: ICD-10-CM

## 2018-12-10 DIAGNOSIS — Z98.890 DIARRHEA FOLLOWING GASTROINTESTINAL SURGERY: ICD-10-CM

## 2018-12-10 DIAGNOSIS — K63.5 POLYPOSIS OF COLON: Primary | ICD-10-CM

## 2018-12-10 DIAGNOSIS — D13.91 POLYPOSIS COLI: Primary | ICD-10-CM

## 2018-12-10 PROCEDURE — 99999 PR PBB SHADOW E&M-EST. PATIENT-LVL III: CPT | Mod: PBBFAC,,, | Performed by: COLON & RECTAL SURGERY

## 2018-12-10 PROCEDURE — 3077F SYST BP >= 140 MM HG: CPT | Mod: CPTII,S$GLB,, | Performed by: COLON & RECTAL SURGERY

## 2018-12-10 PROCEDURE — 99212 OFFICE O/P EST SF 10 MIN: CPT | Mod: S$GLB,,, | Performed by: COLON & RECTAL SURGERY

## 2018-12-10 PROCEDURE — 3008F BODY MASS INDEX DOCD: CPT | Mod: CPTII,S$GLB,, | Performed by: COLON & RECTAL SURGERY

## 2018-12-10 PROCEDURE — 3079F DIAST BP 80-89 MM HG: CPT | Mod: CPTII,S$GLB,, | Performed by: COLON & RECTAL SURGERY

## 2018-12-10 NOTE — LETTER
December 16, 2018      Radha Antoine MD  [address]  [place] [address]           [facility]  [address]  [facility] [address]  Phone: [phone]          Patient: Jenni Prescott Jr.   MR Number: [id number]   YOB: 1960   Date of Visit: 12/10/2018       Dear Dr. Radha Antoine:    Thank you for referring Jenni Prescott to me for evaluation. Attached you will find relevant portions of my assessment and plan of care.    If you have questions, please do not hesitate to call me. I look forward to following Jenni Prescott along with you.    Sincerely,    Wilfred Rahman MD    Enclosure  CC:  No Recipients    If you would like to receive this communication electronically, please contact [email or web address] or [phone] to request more information on [facility] access.    For providers and/or their staff who would like to refer a patient to [facility], please contact us through our one-stop-shop provider referral line, [facility], at [phone].    If you feel you have received this communication in error or would no longer like to receive these types of communications, please e-mail [email or web address]

## 2018-12-14 ENCOUNTER — TELEPHONE (OUTPATIENT)
Dept: INTERNAL MEDICINE | Facility: CLINIC | Age: 58
End: 2018-12-14

## 2018-12-16 NOTE — PROGRESS NOTES
Subjective:       Patient ID: Jenni Prescott Jr. is a 58 y.o. male.    Chief Complaint: Diarrhea    HPI Established pt. S/p total abdominal colectomy/ileorectal anastomosis in 8/2018 for polyposis.  Presents today with functional complaints - increased stool frequency.  Has not used immodium/Lomotil on a scheduled or regular basis. Tolerating regular diet.     Review of Systems   Constitutional: Negative for chills and fever.   Respiratory: Negative for cough and shortness of breath.    Cardiovascular: Negative for chest pain and palpitations.   Gastrointestinal: Negative for nausea and vomiting.   Genitourinary: Negative for dysuria and urgency.   Neurological: Negative for seizures and numbness.       Objective:      Physical Exam   Constitutional: He is oriented to person, place, and time. He appears well-developed and well-nourished.   Eyes: Conjunctivae and EOM are normal.   Pulmonary/Chest: Effort normal. No respiratory distress.   Abdominal: Soft. He exhibits no distension.   Musculoskeletal: Normal range of motion. He exhibits no edema.   Neurological: He is alert and oriented to person, place, and time.   Skin: Skin is warm and dry.       Assessment:       1. Polyposis coli    2. Diarrhea following gastrointestinal surgery        Plan:       Discussed dietary aspects as well as strategy for use of anti-motility agents. Schedule sigmoidoscopy for surveillance of polyps.

## 2019-02-25 ENCOUNTER — PATIENT MESSAGE (OUTPATIENT)
Dept: ADMINISTRATIVE | Facility: OTHER | Age: 59
End: 2019-02-25

## 2019-03-11 ENCOUNTER — ANESTHESIA (OUTPATIENT)
Dept: ENDOSCOPY | Facility: HOSPITAL | Age: 59
End: 2019-03-11
Payer: COMMERCIAL

## 2019-03-11 ENCOUNTER — ANESTHESIA EVENT (OUTPATIENT)
Dept: ENDOSCOPY | Facility: HOSPITAL | Age: 59
End: 2019-03-11
Payer: COMMERCIAL

## 2019-03-11 ENCOUNTER — HOSPITAL ENCOUNTER (OUTPATIENT)
Facility: HOSPITAL | Age: 59
Discharge: HOME | End: 2019-03-11
Attending: COLON & RECTAL SURGERY | Admitting: COLON & RECTAL SURGERY
Payer: COMMERCIAL

## 2019-03-11 VITALS
BODY MASS INDEX: 27.77 KG/M2 | SYSTOLIC BLOOD PRESSURE: 113 MMHG | WEIGHT: 194 LBS | RESPIRATION RATE: 15 BRPM | DIASTOLIC BLOOD PRESSURE: 62 MMHG | HEART RATE: 66 BPM | HEIGHT: 70 IN | OXYGEN SATURATION: 97 % | TEMPERATURE: 98 F

## 2019-03-11 DIAGNOSIS — Z12.11 SCREENING FOR COLON CANCER: ICD-10-CM

## 2019-03-11 PROCEDURE — 45331 SIGMOIDOSCOPY AND BIOPSY: CPT | Performed by: COLON & RECTAL SURGERY

## 2019-03-11 PROCEDURE — 63600175 PHARM REV CODE 636 W HCPCS: Performed by: NURSE ANESTHETIST, CERTIFIED REGISTERED

## 2019-03-11 PROCEDURE — 25000003 PHARM REV CODE 250: Performed by: NURSE PRACTITIONER

## 2019-03-11 PROCEDURE — 88305 TISSUE SPECIMEN TO PATHOLOGY - SURGERY: ICD-10-PCS | Mod: 26,,, | Performed by: PATHOLOGY

## 2019-03-11 PROCEDURE — 45331 PR SIGMOIDOSCOPY,BIOPSY: ICD-10-PCS | Mod: 33,,, | Performed by: COLON & RECTAL SURGERY

## 2019-03-11 PROCEDURE — 37000009 HC ANESTHESIA EA ADD 15 MINS: Performed by: COLON & RECTAL SURGERY

## 2019-03-11 PROCEDURE — 88305 TISSUE EXAM BY PATHOLOGIST: CPT | Performed by: PATHOLOGY

## 2019-03-11 PROCEDURE — 88305 TISSUE EXAM BY PATHOLOGIST: CPT | Mod: 26,,, | Performed by: PATHOLOGY

## 2019-03-11 PROCEDURE — 45331 SIGMOIDOSCOPY AND BIOPSY: CPT | Mod: 33,,, | Performed by: COLON & RECTAL SURGERY

## 2019-03-11 PROCEDURE — 37000008 HC ANESTHESIA 1ST 15 MINUTES: Performed by: COLON & RECTAL SURGERY

## 2019-03-11 PROCEDURE — E9220 PRA ENDO ANESTHESIA: ICD-10-PCS | Mod: 33,,, | Performed by: NURSE ANESTHETIST, CERTIFIED REGISTERED

## 2019-03-11 PROCEDURE — E9220 PRA ENDO ANESTHESIA: HCPCS | Mod: 33,,, | Performed by: NURSE ANESTHETIST, CERTIFIED REGISTERED

## 2019-03-11 PROCEDURE — 27201012 HC FORCEPS, HOT/COLD, DISP: Performed by: COLON & RECTAL SURGERY

## 2019-03-11 RX ORDER — SODIUM CHLORIDE 9 MG/ML
INJECTION, SOLUTION INTRAVENOUS CONTINUOUS
Status: DISCONTINUED | OUTPATIENT
Start: 2019-03-11 | End: 2024-03-26

## 2019-03-11 RX ORDER — LIDOCAINE HCL/PF 100 MG/5ML
SYRINGE (ML) INTRAVENOUS
Status: DISCONTINUED | OUTPATIENT
Start: 2019-03-11 | End: 2019-03-11

## 2019-03-11 RX ORDER — PROPOFOL 10 MG/ML
VIAL (ML) INTRAVENOUS
Status: DISCONTINUED | OUTPATIENT
Start: 2019-03-11 | End: 2019-03-11

## 2019-03-11 RX ORDER — SODIUM CHLORIDE 0.9 % (FLUSH) 0.9 %
3 SYRINGE (ML) INJECTION
Status: DISCONTINUED | OUTPATIENT
Start: 2019-03-11 | End: 2024-03-26

## 2019-03-11 RX ADMIN — SODIUM CHLORIDE: 0.9 INJECTION, SOLUTION INTRAVENOUS at 07:03

## 2019-03-11 RX ADMIN — PROPOFOL 100 MG: 10 INJECTION, EMULSION INTRAVENOUS at 07:03

## 2019-03-11 RX ADMIN — LIDOCAINE HYDROCHLORIDE 50 MG: 20 INJECTION, SOLUTION INTRAVENOUS at 07:03

## 2019-03-11 RX ADMIN — PROPOFOL 50 MG: 10 INJECTION, EMULSION INTRAVENOUS at 07:03

## 2019-03-11 NOTE — ANESTHESIA POSTPROCEDURE EVALUATION
"Anesthesia Post Evaluation    Patient: Jenni Prescott JrPatricia    Procedure(s) Performed: Procedure(s) (LRB):  SIGMOIDOSCOPY, FLEXIBLE (N/A)    Final Anesthesia Type: general  Patient location during evaluation: PACU  Patient participation: Yes- Able to Participate  Level of consciousness: awake and alert  Post-procedure vital signs: reviewed and stable  Pain management: adequate  Airway patency: patent  PONV status at discharge: No PONV  Anesthetic complications: no      Cardiovascular status: blood pressure returned to baseline  Respiratory status: unassisted  Hydration status: euvolemic  Follow-up not needed.        Visit Vitals  /62   Pulse 66   Temp 36.9 °C (98.4 °F)   Resp 15   Ht 5' 10" (1.778 m)   Wt 88 kg (194 lb)   SpO2 97%   BMI 27.84 kg/m²       Pain/Milly Score: Milly Score: 10 (3/11/2019  8:30 AM)        "

## 2019-03-11 NOTE — ANESTHESIA RELEASE NOTE
"Anesthesia Release from PACU Note    Patient: Jenni Prescott     Procedure(s) Performed: Procedure(s) (LRB):  SIGMOIDOSCOPY, FLEXIBLE (N/A)    Anesthesia type: general    Post pain: Adequate analgesia    Post assessment: no apparent anesthetic complications    Last Vitals:   Visit Vitals  /62   Pulse 66   Temp 36.9 °C (98.4 °F)   Resp 15   Ht 5' 10" (1.778 m)   Wt 88 kg (194 lb)   SpO2 97%   BMI 27.84 kg/m²       Post vital signs: stable    Level of consciousness: awake    Nausea/Vomiting: no nausea/no vomiting    Complications: none    Airway Patency: patent    Respiratory: unassisted    Cardiovascular: stable and blood pressure at baseline    Hydration: euvolemic  "

## 2019-03-11 NOTE — ANESTHESIA PREPROCEDURE EVALUATION
Anesthesia Assessment: Preoperative EQUATION    Planned Procedure: Procedure(s) (LRB):  COLECTOMY, TOTAL, LAPAROSCOPIC - lap total (Left)  Insertion-Catheter (Left)  Requested Anesthesia Type:General  Surgeon: Papa Lopez MD  Service: Colon and Rectal  Known or anticipated Date of Surgery:9/5/2018    Surgeon notes: reviewed and Hx of adenomatous Colonic polyps  Previous anesthesia records:6/11/18-Colonoscopy-General-no apparent anesthetic complications and tolerated procedure well  Anesthesia Hx:  History of prior surgery of interest to airway management or planning: Personal Hx of Anesthesia complications  Difficult Intubation, glidescope used successfully   Last PCP note: 6-12 months ago , within [facility] , focused visit   Subspecialty notes: Gastroenterology  Tests already available:  Results have been reviewed.EKG-1/13/16/ Last A1c-9/26/17-5.7  Plan:    Testing:  Hematology Profile, CMP and T&S      Patient  has previously scheduled Medical Appointment:None    Navigation: Tests Scheduled. Labs-Hem Profile/CMP/T&S on 8/29/18 @ 12:30p              Consults scheduled.POC & Perioperative IM Consult on 8/29/18 @ 10a & 11a             Results will be tracked by Preop Clinic.                 Danielle Spence RN  8/8/18                                                                                                                [facility]  Anesthesia Pre-Operative Evaluation         Patient Name: Jenni Prescott Jr.  YOB: 1960  MRN: [id number]    SUBJECTIVE:     Pre-operative evaluation for Procedure(s) (LRB):  COLECTOMY, TOTAL, LAPAROSCOPIC - lap total (Left)  Insertion-Catheter (Left)     03/11/2019    Jenni Prescott Jr. is a 58 y.o. male w/ a significant PMHx of HTN, HLD, GERD, and multiple colonic polyps.     Patient now presents for the above procedure(s).      LDA: 18 G left wrist    Prev airway: mod difficult mask with oral air; Grade IV view with DL x5; Grade I  view with glidescope.     Drips: None documented.    Patient Active Problem List   Diagnosis    Fall    Lumbar transverse process fracture    Fall    Multiple rib fractures    Pneumothorax, closed, traumatic    Multiple closed pelvic fractures with disruption of pelvic ring    Distal radius fracture, right    Range of motion deficit    Right wrist pain    Class 1 obesity with body mass index (BMI) of 30.0 to 30.9 in adult    Essential hypertension    History of prediabetes    Screening for colorectal cancer    Colon polyp    Special screening for malignant neoplasms, colon    Acid reflux    Serum total bilirubin elevated    Adenomatous polyp    Screening for colon cancer       Review of patient's allergies indicates:   Allergen Reactions    Codeine      Itching with Codeine , Oxycodone     Oxycodone      Itching        Current Outpatient Medications:  No current facility-administered medications for this visit.   No current outpatient medications on file.    Facility-Administered Medications Ordered in Other Visits:     0.9%  NaCl infusion, , Intravenous, Continuous, Eliane Carrizales NP    dextrose 5 % and 0.45 % NaCl with KCl 20 mEq infusion, , Intravenous, Continuous, Patel Barney MD, Last Rate: 50 mL/hr at 09/08/18 1059    dextrose 50% injection 12.5 g, 12.5 g, Intravenous, PRN, Dominique Mckeon NP    dextrose 50% injection 12.5 g, 12.5 g, Intravenous, PRN, Dominique Mckeon NP    dextrose 50% injection 25 g, 25 g, Intravenous, PRN, Dominique Mckeon NP    enoxaparin injection 40 mg, 40 mg, Subcutaneous, Q24H, Dominique Mckeon NP, 40 mg at 09/07/18 1445    gabapentin capsule 300 mg, 300 mg, Oral, On Call Procedure, Dominique Mckeon NP, 300 mg at 09/05/18 0726    gabapentin capsule 300 mg, 300 mg, Oral, TID, Dominique Mckeon NP, 300 mg at 09/08/18 1048    glucagon (human recombinant) injection 1 mg, 1 mg, Intramuscular, Continuous PRN, Dominique PUTNAM  Taullie, NP    glucose chewable tablet 16 g, 16 g, Oral, PRN, Dominique Mckeon NP    glucose chewable tablet 16 g, 16 g, Oral, PRN, Dominique Mckeon NP    glucose chewable tablet 24 g, 24 g, Oral, PRN, Dominique Mckeon NP    ibuprofen (CALDOLOR) 800mg/250mL D5W (READY TO MIX SYSTEM), 800 mg, Intravenous, Q8H, Dominique Mckeon NP, 800 mg at 09/08/18 0645    naloxone 0.4 mg/mL injection 0.02 mg, 0.02 mg, Intravenous, PRN, Dominique Mckeon NP    ondansetron injection 4 mg, 4 mg, Intravenous, Q6H PRN, Dominique Mckeon NP, 4 mg at 09/06/18 1815    promethazine (PHENERGAN) 6.25 mg in dextrose 5 % 50 mL IVPB, 6.25 mg, Intravenous, Q6H PRN, Dominique Mckeon NP    sodium chloride 0.9% flush 3 mL, 3 mL, Intravenous, PRN, Eliane Carrizales NP    Past Surgical History:   Procedure Laterality Date    COLECTOMY, TOTAL, LAPAROSCOPIC - lap total Left 9/5/2018    Performed by [facility] Lopez [facility] at [facility] OR 2ND FLR    COLONOSCOPY N/A 6/11/2018    Performed by Papa Lopze MD at [facility] ENDO (4TH FLR)    COLONOSCOPY N/A 1/8/2018    Performed by Carson Yañez Jr., MD at [facility]    COLONOSCOPY/Miralax Split N/A 5/21/2018    Performed by Malvin Lozano MD at [facility]    COLONOSCOPY/Polypectomy N/A 2/19/2018    Performed by Rex Blanco MD at [facility]    ESOPHAGOGASTRODUODENOSCOPY (EGD) w/ duodenoscope N/A 5/21/2018    Performed by [facility]ano [facility] at [facility]    Insertion-Catheter Left 9/5/2018    Performed by Mohan Ventura MD at [facility] OR 2ND FLR    KNEE SURGERY Bilateral     states he has screws    KNEE SURGERY      3 reconstructive surgeries 20yrs go     OPEN REDUCTION INTERNAL FIXATION- DISTAL RADIUS  Right 4/22/2015    Performed by Solomon Leger MD at [facility] OR 2ND FLR    WRIST SURGERY      2015       Social History     Socioeconomic History    Marital status:      Spouse name: Not on file    Number of children: Not on file    Years of  education: Not on file    Highest education level: Not on file   Social Needs    Financial resource strain: Not on file    Food insecurity - worry: Not on file    Food insecurity - inability: Not on file    Transportation needs - medical: Not on file    Transportation needs - non-medical: Not on file   Occupational History    Not on file   Tobacco Use    Smoking status: Never Smoker    Smokeless tobacco: Never Used   Substance and Sexual Activity    Alcohol use: No     Comment:  quit     Drug use: Yes     Types: Marijuana     Comment: a few times a week, restarted about 2 months ago    Sexual activity: Yes     Partners: Female   Other Topics Concern    Not on file   Social History Narrative    Not on file       OBJECTIVE:     Vital Signs Range (Last 24H):           Diagnostic Studies: No relevant studies.    EKG: No recent studies available.    2D ECHO:  No results found for this or any previous visit.      ASSESSMENT/PLAN:         Pre-op Assessment    I have reviewed the Patient Summary Reports.     I have reviewed the Nursing Notes.   I have reviewed the Medications.     Review of Systems  Anesthesia Hx:  glidescope used 2015 (ORIF arm); grade 1 with glidescope; grade 4 with DL (x 5 attempts); anterior larynx History of prior surgery of interest to airway management or planning: Previous anesthesia: General colonoscopy 6/11/18 with general anesthesia.  Procedure performed at an [facility]. Denies Family Hx of Anesthesia complications.    Social:  Non-Smoker, No Alcohol Use  Illicit Drug Use: Types of drugs include Marijuana,   Hematology/Oncology:  Hematology Normal   Oncology Normal     EENT/Dental:   Reading glasses   Cardiovascular:   Hypertension, well controlled hyperlipidemia  Functional Capacity good / => 4 METS, works in construction, climbs ladder, stairs; hiking; denies CP, SOB    Pulmonary:   Denies Asthma.  Denies Shortness of breath.  Denies Sleep Apnea.    Renal/:   renal  calculi    Hepatic/GI:   GERD (takes Protonix. Hasn't had symptoms in over a year), well controlled Adenomatous polyps   Musculoskeletal:  Musculoskeletal Normal    Neurological:   Denies CVA. Denies Seizures.  Denies Pain    Endocrine:   Denies Diabetes.    Psych:  Psychiatric Normal           Physical Exam  General:  Well nourished    Airway/Jaw/Neck:  Airway Findings: Mouth Opening: Small, but > 3cm Tongue: Large  General Airway Assessment: Adult  Mallampati: III  Improves to III with phonation.  TM Distance: 4 - 6 cm  Jaw/Neck Findings:     Neck ROM: Normal ROM      Dental:  Dental Findings:    Chest/Lungs:  Chest/Lungs Findings: Clear to auscultation, Normal Respiratory Rate     Heart/Vascular:  Heart Findings: Rate: Normal  Rhythm: Regular Rhythm  Sounds: Normal  Heart murmur: negative Vascular Findings: Normal       Mental Status:  Mental Status Findings:  Cooperative, Alert and Oriented       Pt was seen in POC 8/29/18; HISTORY OF DIFFICULT INTUBATION WITH GLIDESCOPE USED (2015) Medical optimization: please see EPIC notes for recommendations of pre-op medical consultant, Dr Mosley, for perioperative medical management./Ingris Bran MD           Anesthesia Plan  Type of Anesthesia, risks & benefits discussed:  Anesthesia Type:  general  Patient's Preference:   Intra-op Monitoring Plan: standard ASA monitors  Intra-op Monitoring Plan Comments:   Post Op Pain Control Plan: per primary service following discharge from PACU and multimodal analgesia  Post Op Pain Control Plan Comments:   Induction:   IV  Beta Blocker:  Patient is not currently on a Beta-Blocker (No further documentation required).       Informed Consent: Patient understands risks and agrees with Anesthesia plan.  Questions answered. Anesthesia consent signed with patient.  ASA Score: 2     Day of Surgery Review of History & Physical: I have interviewed and examined the patient. I have reviewed the patient's H&P dated:  There are no significant  changes.  H&P update referred to the surgeon.         Ready For Surgery From Anesthesia Perspective.

## 2019-03-11 NOTE — H&P
"COLONOSCOPY HISTORY AND PE    Procedure : Colonoscopy      INDICATIONS: asymptomatic screening exam and personal history of colon polyps    Family Hx of CRC: none    Last Colonoscopy:  sigmoidoscopy  Findings: multiple hyperplastic polyps in rectum       Past Medical History:   Diagnosis Date    GERD (gastroesophageal reflux disease)     Taking Protonix    Hyperlipidemia     Hypertension     Neuropathic pain of right thigh     History     Sedation Problems: NO  Family History   Problem Relation Age of Onset    Heart disease Mother 40    Cancer Father     Lung cancer Father     Hyperlipidemia Brother     Hypertension Brother     Anesthesia problems Neg Hx      Fam Hx of Sedation Problems: NO  Social History     Socioeconomic History    Marital status:      Spouse name: Not on file    Number of children: Not on file    Years of education: Not on file    Highest education level: Not on file   Social Needs    Financial resource strain: Not on file    Food insecurity - worry: Not on file    Food insecurity - inability: Not on file    Transportation needs - medical: Not on file    Transportation needs - non-medical: Not on file   Occupational History    Not on file   Tobacco Use    Smoking status: Never Smoker    Smokeless tobacco: Never Used   Substance and Sexual Activity    Alcohol use: No     Comment:  quit     Drug use: Yes     Types: Marijuana     Comment: a few times a week, restarted about 2 months ago    Sexual activity: Yes     Partners: Female   Other Topics Concern    Not on file   Social History Narrative    Not on file       Review of Systems - Negative except   Respiratory ROS: no dyspnea  Cardiovascular ROS: no exertional chest pain  Gastrointestinal ROS: NO abdominal discomfort,  NO rectal bleeding  Musculoskeletal ROS: no muscular pain  Neurological ROS: no recent stroke    Physical Exam:  /62   Pulse 66   Temp 98.4 °F (36.9 °C)   Resp 15   Ht 5' 10" (1.778 m)  "  Wt 88 kg (194 lb)   SpO2 97%   BMI 27.84 kg/m²   General: no distress  Head: normocephalic  Mallampati Score   Neck: supple, symmetrical, trachea midline  Lungs:  normal respiratory effort  Heart: regular rate and rhythm  Abdomen: soft, non-tender non-distented; bowel sounds normal; no masses,  no organomegaly  Extremities: no cyanosis or edema, or clubbing    ASA:  II    PLAN  COLONOSCOPY.    SedationPlan :MAC    The details of the procedure, the possible need for biopsy or polypectomy and the potential risks including bleeding, perforation, missed polyps were discussed in detail.

## 2019-03-11 NOTE — DISCHARGE INSTRUCTIONS
Colonoscopy     A camera attached to a flexible tube with a viewing lens is used to take video pictures.     Colonoscopy is a test to view the inside of your lower digestive tract (colon and rectum). Sometimes it can show the last part of the small intestine (ileum). During the test, small pieces of tissue may be removed for testing. This is called a biopsy. Small growths, such as polyps, may also be removed.   Why is colonoscopy done?  The test is done to help look for colon cancer. And it can help find the source of abdominal pain, bleeding, and changes in bowel habits. It may be needed once a year, depending on factors such as your:  · Age  · Health history  · Family health history  · Symptoms  · Results from any prior colonoscopy  Risks and possible complications  These include:  · Bleeding               · A puncture or tear in the colon   · Risks of anesthesia  · A cancer lesion not being seen  Getting ready   To prepare for the test:  · Talk with your healthcare provider about the risks of the test (see below). Also ask your healthcare provider about alternatives to the test.  · Tell your healthcare provider about any medicines you take. Also tell him or her about any health conditions you may have.  · Make sure your rectum and colon are empty for the test. Follow the diet and bowel prep instructions exactly. If you dont, the test may need to be rescheduled.  · Plan for a friend or family member to drive you home after the test.     Colonoscopy provides an inside view of the entire colon.     You may discuss the results with your doctor right away or at a future visit.  During the test   The test is usually done in the hospital on an outpatient basis. This means you go home the same day. The procedure takes about 30 minutes. During that time:  · You are given relaxing (sedating) medicine through an IV line. You may be drowsy, or fully asleep.  · The healthcare provider will first give you a physical exam to  check for anal and rectal problems.  · Then the anus is lubricated and the scope inserted.  · If you are awake, you may have a feeling similar to needing to have a bowel movement. You may also feel pressure as air is pumped into the colon. Its OK to pass gas during the procedure.  · Biopsy, polyp removal, or other treatments may be done during the test.  After the test   You may have gas right after the test. It can help to try to pass it to help prevent later bloating. Your healthcare provider may discuss the results with you right away. Or you may need to schedule a follow-up visit to talk about the results. After the test, you can go back to your normal eating and other activities. You may be tired from the sedation and need to rest for a few hours.  Date Last Reviewed: 11/1/2016 © 2000-2017 [employer]. [address] [place] [address], [place] [address]. All rights reserved. This information is not intended as a substitute for professional medical care. Always follow your healthcare professional's instructions.

## 2019-03-11 NOTE — TRANSFER OF CARE
"Anesthesia Transfer of Care Note    Patient: Jenni Prescott Jr.    Procedure(s) Performed: Procedure(s) (LRB):  SIGMOIDOSCOPY, FLEXIBLE (N/A)    Patient location: PACU    Anesthesia Type: general    Transport from OR: Transported from OR on room air with adequate spontaneous ventilation    Post pain: adequate analgesia    Post assessment: no apparent anesthetic complications    Post vital signs: stable    Level of consciousness: sedated and responds to stimulation    Nausea/Vomiting: no nausea/vomiting    Complications: none    Transfer of care protocol was followed      Last vitals:   Visit Vitals  BP (!) 114/56   Pulse 71   Temp 36.9 °C (98.4 °F)   Resp 15   Ht 5' 10" (1.778 m)   Wt 88 kg (194 lb)   SpO2 95%   BMI 27.84 kg/m²     "

## 2019-03-11 NOTE — [employer] PATIENT INSTRUCTIONS
Discharge Summary/Instructions after an Endoscopic Procedure  Patient Name: Jenni Prescott  Patient MRN: [id number]  Patient YOB: 1960  Monday, March 11, 2019  Wilfred Rahman MD  RESTRICTIONS:  During your procedure today, you received medications for sedation.  These   medications may affect your judgment, balance and coordination.  Therefore,   for 24 hours, you have the following restrictions:   - DO NOT drive a car, operate machinery, make legal/financial decisions,   sign important papers or drink alcohol.    ACTIVITY:  Today: no heavy lifting, straining or running due to procedural   sedation/anesthesia.  The following day: return to full activity including work.  DIET:  Eat and drink normally unless instructed otherwise.     TREATMENT FOR COMMON SIDE EFFECTS:  - Mild abdominal pain, nausea, belching, bloating or excessive gas:  rest,   eat lightly and use a heating pad.  - Sore Throat: treat with throat lozenges and/or gargle with warm salt   water.  - Because air was used during the procedure, expelling large amounts of air   from your rectum or belching is normal.  - If a bowel prep was taken, you may not have a bowel movement for 1-3 days.    This is normal.  SYMPTOMS TO WATCH FOR AND REPORT TO YOUR PHYSICIAN:  1. Abdominal pain or bloating, other than gas cramps.  2. Chest pain.  3. Back pain.  4. Signs of infection such as: chills or fever occurring within 24 hours   after the procedure.  5. Rectal bleeding, which would show as bright red, maroon, or black stools.   (A tablespoon of blood from the rectum is not serious, especially if   hemorrhoids are present.)  6. Vomiting.  7. Weakness or dizziness.  GO DIRECTLY TO THE NEAREST EMERGENCY ROOM IF YOU HAVE ANY OF THE FOLLOWING:      Difficulty breathing              Chills and/or fever over 101 F   Persistent vomiting and/or vomiting blood   Severe abdominal pain   Severe chest pain   Black, tarry stools   Bleeding- more than one  tablespoon   Any other symptom or condition that you feel may need urgent attention  Your doctor recommends these additional instructions:  If any biopsies were taken, your doctors clinic will contact you in 1 to 2   weeks with any results.  - Discharge patient to home (ambulatory).   - Resume regular diet indefinitely.   - Continue present medications.   - Repeat flexible sigmoidoscopy in 1 year for surveillance.   - The patient will be observed post-procedure, until all discharge criteria   are met.  For questions, problems or results please call your physician - Wilfred Rahman MD at Work:  [phone].  [facility] PHONE NUMBER: [phone]  IF A COMPLICATION OR EMERGENCY SITUATION ARISES AND YOU ARE UNABLE TO REACH   YOUR PHYSICIAN - GO DIRECTLY TO THE EMERGENCY ROOM.  Wilfred Rahman MD  3/11/2019 7:53:05 AM  This report has been verified and signed electronically.  [employer]

## 2019-03-18 ENCOUNTER — TELEPHONE (OUTPATIENT)
Dept: ENDOSCOPY | Facility: HOSPITAL | Age: 59
End: 2019-03-18

## 2019-07-08 DIAGNOSIS — K21.9 GASTROESOPHAGEAL REFLUX DISEASE, ESOPHAGITIS PRESENCE NOT SPECIFIED: ICD-10-CM

## 2019-07-08 RX ORDER — PANTOPRAZOLE SODIUM 40 MG/1
TABLET, DELAYED RELEASE ORAL
Qty: 30 TABLET | Refills: 0 | Status: ON HOLD | OUTPATIENT
Start: 2019-07-08 | End: 2023-01-14 | Stop reason: HOSPADM

## 2020-08-27 NOTE — TELEPHONE ENCOUNTER
Spoke with patient about getting a new PCP, but patient states will call back to set this appointment since he was in a meeting.     no abrasions, no jaundice, no lesions, no pruritis, and no rashes.

## 2020-10-05 ENCOUNTER — PATIENT MESSAGE (OUTPATIENT)
Dept: ADMINISTRATIVE | Facility: HOSPITAL | Age: 60
End: 2020-10-05

## 2020-11-02 ENCOUNTER — TELEPHONE (OUTPATIENT)
Dept: SURGERY | Facility: CLINIC | Age: 60
End: 2020-11-02

## 2020-11-02 DIAGNOSIS — Z83.710 FAMILY HISTORY OF FAP (FAMILIAL ADENOMATOUS POLYPOSIS): Primary | ICD-10-CM

## 2021-01-05 ENCOUNTER — PATIENT MESSAGE (OUTPATIENT)
Dept: ADMINISTRATIVE | Facility: HOSPITAL | Age: 61
End: 2021-01-05

## 2021-04-05 ENCOUNTER — PATIENT MESSAGE (OUTPATIENT)
Dept: ADMINISTRATIVE | Facility: HOSPITAL | Age: 61
End: 2021-04-05

## 2021-06-23 NOTE — TELEPHONE ENCOUNTER
----- Message from Dima Gonzalez sent at 9/7/2018  4:20 PM CDT -----  Contact: Case Management  .Patient Requesting Sooner Appointment.     Reason for sooner appt.:pt needs a 2 wk post op  When is the first available appointment?10/02  Communication Preference:Pt:[phone]  Additional Information:   No

## 2021-07-06 ENCOUNTER — PATIENT MESSAGE (OUTPATIENT)
Dept: ADMINISTRATIVE | Facility: HOSPITAL | Age: 61
End: 2021-07-06

## 2021-09-14 NOTE — ANESTHESIA PREPROCEDURE EVALUATION
01/08/2018  Jenni Prescott Jr. is a 57 y.o., male.    Anesthesia Evaluation      I have reviewed the Medications.     Review of Systems  Anesthesia Hx:  Denies Family Hx of Anesthesia complications.   Social:  Non-Smoker, No Alcohol Use  Illicit Drug Use: Types of drugs include Marijuana,   Cardiovascular:   Exercise tolerance: good Hypertension        Physical Exam  General:  Well nourished    Airway/Jaw/Neck:  Airway Findings: Mouth Opening: Normal Tongue: Normal  General Airway Assessment: Adult  Mallampati: II      Dental:  Dental Findings: In tact   Chest/Lungs:  Chest/Lungs Findings: Clear to auscultation, Normal Respiratory Rate     Heart/Vascular:  Heart Findings: Rate: Normal  Rhythm: Regular Rhythm        Mental Status:  Mental Status Findings:  Cooperative, Alert and Oriented         Anesthesia Plan  Type of Anesthesia, risks & benefits discussed:  Anesthesia Type:  MAC  Patient's Preference: MAC  Intra-op Monitoring Plan:   Intra-op Monitoring Plan Comments:   Post Op Pain Control Plan:   Post Op Pain Control Plan Comments:   Induction:   IV  Beta Blocker:         Informed Consent: Patient understands risks and agrees with Anesthesia plan.  Questions answered. Anesthesia consent signed with patient.  ASA Score: 2     Day of Surgery Review of History & Physical:            Ready For Surgery From Anesthesia Perspective.       
detailed exam

## 2021-10-04 ENCOUNTER — PATIENT MESSAGE (OUTPATIENT)
Dept: ADMINISTRATIVE | Facility: HOSPITAL | Age: 61
End: 2021-10-04

## 2022-12-29 ENCOUNTER — NURSE TRIAGE (OUTPATIENT)
Dept: ADMINISTRATIVE | Facility: CLINIC | Age: 62
End: 2022-12-29

## 2022-12-29 NOTE — TELEPHONE ENCOUNTER
Patient's spouse is calling, Saturday he ate 2 raw oysters, unsure where they were from. On Thursday he ate leftovers from Monday.   Vomiting and diarrhea, spouse has given him several OTC medication and now he is constipated, but has started on laxative. She is wondering what else he can do. Advise I would need to triage him.     We are not on a 3 way call. Patient has had colon resection many years ago, has not seen a provider since 2020. Patient state he had bad abdominal pain, but now while I am speaking with him, he states it seems to be going away. 2/10. Triage done- see today, patient does not have insurance at this time. Offered OAC, but strongly advised to go to ED if severe stomach pain returns. Verb understanding.     Reason for Disposition   Age > 60 years    Additional Information   Negative: Passed out (i.e., fainted, collapsed and was not responding)   Negative: Shock suspected (e.g., cold/pale/clammy skin, too weak to stand, low BP, rapid pulse)   Negative: Sounds like a life-threatening emergency to the triager   Negative: SEVERE abdominal pain (e.g., excruciating)   Negative: Vomiting red blood or black (coffee ground) material   Negative: Bloody, black, or tarry bowel movements  (Exception: Chronic-unchanged black-grey bowel movements and is taking iron pills or Pepto-Bismol.)   Negative: Unable to urinate (or only a few drops) and bladder feels very full   Negative: Pain in scrotum persists > 1 hour   Negative: Constant abdominal pain lasting > 2 hours   Negative: Vomiting bile (green color)   Negative: Patient sounds very sick or weak to the triager   Negative: Vomiting and abdomen looks much more swollen than usual   Negative: White of the eyes have turned yellow (i.e., jaundice)   Negative: Blood in urine (red, pink, or tea-colored)   Negative: Fever > 103 F (39.4 C)   Negative: Fever > 101 F (38.3 C) and over 60 years of age   Negative: Fever > 100.0 F (37.8 C) and has diabetes mellitus or a  weak immune system (e.g., HIV positive, cancer chemotherapy, organ transplant, splenectomy, chronic steroids)   Negative: Fever > 100.0 F (37.8 C) and bedridden (e.g., nursing home patient, stroke, chronic illness, recovering from surgery)   Negative: MODERATE pain (e.g., interferes with normal activities) that comes and goes (cramps) lasts > 24 hours  (Exception: pain with Vomiting or Diarrhea - see that Protocol)    Protocols used: Abdominal Pain - Male-A-[place]

## 2022-12-30 ENCOUNTER — HOSPITAL ENCOUNTER (OUTPATIENT)
Facility: HOSPITAL | Age: 62
Discharge: HOME | End: 2022-12-31
Attending: EMERGENCY MEDICINE | Admitting: SURGERY

## 2022-12-30 DIAGNOSIS — K56.609 INTESTINAL OBSTRUCTION, UNSPECIFIED CAUSE, UNSPECIFIED WHETHER PARTIAL OR COMPLETE: Primary | ICD-10-CM

## 2022-12-30 DIAGNOSIS — R10.9 ABDOMINAL PAIN: ICD-10-CM

## 2022-12-30 LAB
ALBUMIN SERPL BCP-MCNC: 4.2 G/DL (ref 3.5–5.2)
ALP SERPL-CCNC: 76 U/L (ref 55–135)
ALT SERPL W/O P-5'-P-CCNC: 31 U/L (ref 10–44)
ANION GAP SERPL CALC-SCNC: 13 MMOL/L (ref 8–16)
AST SERPL-CCNC: 22 U/L (ref 10–40)
BACTERIA #/AREA URNS AUTO: ABNORMAL /HPF
BASOPHILS # BLD AUTO: 0.02 K/UL (ref 0–0.2)
BASOPHILS NFR BLD: 0.3 % (ref 0–1.9)
BILIRUB SERPL-MCNC: 5.2 MG/DL (ref 0.1–1)
BILIRUB UR QL STRIP: NEGATIVE
BUN SERPL-MCNC: 28 MG/DL (ref 6–20)
CALCIUM SERPL-MCNC: 9.2 MG/DL (ref 8.7–10.5)
CHLORIDE SERPL-SCNC: 96 MMOL/L (ref 95–110)
CLARITY UR REFRACT.AUTO: ABNORMAL
CO2 SERPL-SCNC: 29 MMOL/L (ref 23–29)
COLOR UR AUTO: YELLOW
CREAT SERPL-MCNC: 1.3 MG/DL (ref 0.5–1.4)
DIFFERENTIAL METHOD: ABNORMAL
EOSINOPHIL # BLD AUTO: 0.1 K/UL (ref 0–0.5)
EOSINOPHIL NFR BLD: 0.9 % (ref 0–8)
ERYTHROCYTE [DISTWIDTH] IN BLOOD BY AUTOMATED COUNT: 13.3 % (ref 11.5–14.5)
EST. GFR  (NO RACE VARIABLE): >60 ML/MIN/1.73 M^2
GLUCOSE SERPL-MCNC: 110 MG/DL (ref 70–110)
GLUCOSE UR QL STRIP: NEGATIVE
HCT VFR BLD AUTO: 50.9 % (ref 40–54)
HCV AB SERPL QL IA: NORMAL
HGB BLD-MCNC: 17.1 G/DL (ref 14–18)
HGB UR QL STRIP: ABNORMAL
HIV 1+2 AB+HIV1 P24 AG SERPL QL IA: NORMAL
HYALINE CASTS UR QL AUTO: 49 /LPF
IMM GRANULOCYTES # BLD AUTO: 0.01 K/UL (ref 0–0.04)
IMM GRANULOCYTES NFR BLD AUTO: 0.2 % (ref 0–0.5)
KETONES UR QL STRIP: NEGATIVE
LACTATE SERPL-SCNC: 1.2 MMOL/L (ref 0.5–2.2)
LEUKOCYTE ESTERASE UR QL STRIP: NEGATIVE
LIPASE SERPL-CCNC: 24 U/L (ref 4–60)
LYMPHOCYTES # BLD AUTO: 1.6 K/UL (ref 1–4.8)
LYMPHOCYTES NFR BLD: 26.5 % (ref 18–48)
MCH RBC QN AUTO: 29.6 PG (ref 27–31)
MCHC RBC AUTO-ENTMCNC: 33.6 G/DL (ref 32–36)
MCV RBC AUTO: 88 FL (ref 82–98)
MICROSCOPIC COMMENT: ABNORMAL
MONOCYTES # BLD AUTO: 1 K/UL (ref 0.3–1)
MONOCYTES NFR BLD: 17.1 % (ref 4–15)
NEUTROPHILS # BLD AUTO: 3.2 K/UL (ref 1.8–7.7)
NEUTROPHILS NFR BLD: 55 % (ref 38–73)
NITRITE UR QL STRIP: NEGATIVE
NRBC BLD-RTO: 0 /100 WBC
PH UR STRIP: 6 [PH] (ref 5–8)
PLATELET # BLD AUTO: 308 K/UL (ref 150–450)
PMV BLD AUTO: 10.9 FL (ref 9.2–12.9)
POTASSIUM SERPL-SCNC: 3.6 MMOL/L (ref 3.5–5.1)
PROT SERPL-MCNC: 7.5 G/DL (ref 6–8.4)
PROT UR QL STRIP: ABNORMAL
RBC # BLD AUTO: 5.77 M/UL (ref 4.6–6.2)
RBC #/AREA URNS AUTO: 4 /HPF (ref 0–4)
SODIUM SERPL-SCNC: 138 MMOL/L (ref 136–145)
SP GR UR STRIP: 1.02 (ref 1–1.03)
URN SPEC COLLECT METH UR: ABNORMAL
WBC # BLD AUTO: 5.85 K/UL (ref 3.9–12.7)
WBC #/AREA URNS AUTO: 5 /HPF (ref 0–5)

## 2022-12-30 PROCEDURE — G0378 HOSPITAL OBSERVATION PER HR: HCPCS

## 2022-12-30 PROCEDURE — 93010 EKG 12-LEAD: ICD-10-PCS | Mod: ,,, | Performed by: INTERNAL MEDICINE

## 2022-12-30 PROCEDURE — 99291 CRITICAL CARE FIRST HOUR: CPT | Mod: ,,, | Performed by: EMERGENCY MEDICINE

## 2022-12-30 PROCEDURE — 80053 COMPREHEN METABOLIC PANEL: CPT | Performed by: EMERGENCY MEDICINE

## 2022-12-30 PROCEDURE — 94761 N-INVAS EAR/PLS OXIMETRY MLT: CPT

## 2022-12-30 PROCEDURE — 25500020 PHARM REV CODE 255: Performed by: EMERGENCY MEDICINE

## 2022-12-30 PROCEDURE — 96374 THER/PROPH/DIAG INJ IV PUSH: CPT

## 2022-12-30 PROCEDURE — 96375 TX/PRO/DX INJ NEW DRUG ADDON: CPT

## 2022-12-30 PROCEDURE — 63600175 PHARM REV CODE 636 W HCPCS

## 2022-12-30 PROCEDURE — 25000003 PHARM REV CODE 250: Performed by: EMERGENCY MEDICINE

## 2022-12-30 PROCEDURE — 96361 HYDRATE IV INFUSION ADD-ON: CPT

## 2022-12-30 PROCEDURE — 86803 HEPATITIS C AB TEST: CPT | Performed by: PHYSICIAN ASSISTANT

## 2022-12-30 PROCEDURE — 93005 ELECTROCARDIOGRAM TRACING: CPT

## 2022-12-30 PROCEDURE — 25500020 PHARM REV CODE 255

## 2022-12-30 PROCEDURE — 81001 URINALYSIS AUTO W/SCOPE: CPT | Performed by: EMERGENCY MEDICINE

## 2022-12-30 PROCEDURE — 99291 PR CRITICAL CARE, E/M 30-74 MINUTES: ICD-10-PCS | Mod: ,,, | Performed by: EMERGENCY MEDICINE

## 2022-12-30 PROCEDURE — 25500020 PHARM REV CODE 255: Performed by: SURGERY

## 2022-12-30 PROCEDURE — 83690 ASSAY OF LIPASE: CPT | Performed by: EMERGENCY MEDICINE

## 2022-12-30 PROCEDURE — 63600175 PHARM REV CODE 636 W HCPCS: Performed by: EMERGENCY MEDICINE

## 2022-12-30 PROCEDURE — 85025 COMPLETE CBC W/AUTO DIFF WBC: CPT | Performed by: EMERGENCY MEDICINE

## 2022-12-30 PROCEDURE — 83605 ASSAY OF LACTIC ACID: CPT | Performed by: EMERGENCY MEDICINE

## 2022-12-30 PROCEDURE — 99285 EMERGENCY DEPT VISIT HI MDM: CPT | Mod: 25

## 2022-12-30 PROCEDURE — 93010 ELECTROCARDIOGRAM REPORT: CPT | Mod: ,,, | Performed by: INTERNAL MEDICINE

## 2022-12-30 PROCEDURE — 87389 HIV-1 AG W/HIV-1&-2 AB AG IA: CPT | Performed by: PHYSICIAN ASSISTANT

## 2022-12-30 RX ORDER — SODIUM CHLORIDE, SODIUM LACTATE, POTASSIUM CHLORIDE, CALCIUM CHLORIDE 600; 310; 30; 20 MG/100ML; MG/100ML; MG/100ML; MG/100ML
INJECTION, SOLUTION INTRAVENOUS CONTINUOUS
Status: DISCONTINUED | OUTPATIENT
Start: 2022-12-30 | End: 2022-12-31

## 2022-12-30 RX ORDER — PROCHLORPERAZINE EDISYLATE 5 MG/ML
5 INJECTION INTRAMUSCULAR; INTRAVENOUS EVERY 6 HOURS PRN
Status: DISCONTINUED | OUTPATIENT
Start: 2022-12-30 | End: 2022-12-31 | Stop reason: HOSPADM

## 2022-12-30 RX ORDER — MORPHINE SULFATE 2 MG/ML
2 INJECTION, SOLUTION INTRAMUSCULAR; INTRAVENOUS EVERY 4 HOURS PRN
Status: DISCONTINUED | OUTPATIENT
Start: 2022-12-30 | End: 2022-12-31 | Stop reason: HOSPADM

## 2022-12-30 RX ORDER — ONDANSETRON 2 MG/ML
8 INJECTION INTRAMUSCULAR; INTRAVENOUS
Status: COMPLETED | OUTPATIENT
Start: 2022-12-30 | End: 2022-12-30

## 2022-12-30 RX ORDER — MORPHINE SULFATE 4 MG/ML
4 INJECTION, SOLUTION INTRAMUSCULAR; INTRAVENOUS EVERY 4 HOURS PRN
Status: DISCONTINUED | OUTPATIENT
Start: 2022-12-30 | End: 2022-12-31 | Stop reason: HOSPADM

## 2022-12-30 RX ORDER — SODIUM CHLORIDE 0.9 % (FLUSH) 0.9 %
10 SYRINGE (ML) INJECTION
Status: DISCONTINUED | OUTPATIENT
Start: 2022-12-30 | End: 2022-12-31 | Stop reason: HOSPADM

## 2022-12-30 RX ORDER — HYDROMORPHONE HYDROCHLORIDE 1 MG/ML
0.5 INJECTION, SOLUTION INTRAMUSCULAR; INTRAVENOUS; SUBCUTANEOUS
Status: COMPLETED | OUTPATIENT
Start: 2022-12-30 | End: 2022-12-30

## 2022-12-30 RX ORDER — ONDANSETRON 2 MG/ML
4 INJECTION INTRAMUSCULAR; INTRAVENOUS EVERY 8 HOURS PRN
Status: DISCONTINUED | OUTPATIENT
Start: 2022-12-30 | End: 2022-12-31 | Stop reason: HOSPADM

## 2022-12-30 RX ORDER — MORPHINE SULFATE 4 MG/ML
4 INJECTION, SOLUTION INTRAMUSCULAR; INTRAVENOUS
Status: DISCONTINUED | OUTPATIENT
Start: 2022-12-30 | End: 2022-12-30

## 2022-12-30 RX ORDER — LIDOCAINE HYDROCHLORIDE 20 MG/ML
JELLY TOPICAL
Status: COMPLETED | OUTPATIENT
Start: 2022-12-30 | End: 2022-12-30

## 2022-12-30 RX ORDER — TALC
6 POWDER (GRAM) TOPICAL NIGHTLY PRN
Status: DISCONTINUED | OUTPATIENT
Start: 2022-12-30 | End: 2022-12-31 | Stop reason: HOSPADM

## 2022-12-30 RX ADMIN — ONDANSETRON 8 MG: 2 INJECTION INTRAMUSCULAR; INTRAVENOUS at 09:12

## 2022-12-30 RX ADMIN — HYDROMORPHONE HYDROCHLORIDE 0.5 MG: 1 INJECTION, SOLUTION INTRAMUSCULAR; INTRAVENOUS; SUBCUTANEOUS at 08:12

## 2022-12-30 RX ADMIN — IOHEXOL 100 ML: 350 INJECTION, SOLUTION INTRAVENOUS at 09:12

## 2022-12-30 RX ADMIN — SODIUM CHLORIDE, POTASSIUM CHLORIDE, SODIUM LACTATE AND CALCIUM CHLORIDE: 600; 310; 30; 20 INJECTION, SOLUTION INTRAVENOUS at 12:12

## 2022-12-30 RX ADMIN — LIDOCAINE HYDROCHLORIDE 10 ML: 20 JELLY TOPICAL at 12:12

## 2022-12-30 RX ADMIN — DIATRIZOATE MEGLUMINE AND DIATRIZOATE SODIUM 100 ML: 660; 100 LIQUID ORAL; RECTAL at 04:12

## 2022-12-30 RX ADMIN — MORPHINE SULFATE 2 MG: 2 INJECTION, SOLUTION INTRAMUSCULAR; INTRAVENOUS at 11:12

## 2022-12-30 RX ADMIN — SODIUM CHLORIDE, POTASSIUM CHLORIDE, SODIUM LACTATE AND CALCIUM CHLORIDE: 600; 310; 30; 20 INJECTION, SOLUTION INTRAVENOUS at 10:12

## 2022-12-30 NOTE — ED PROVIDER NOTES
Encounter Date: 12/30/2022       History     Chief Complaint   Patient presents with    Abdominal Pain     Vomited on tues, hx colectomy 4 yrs ago, feeling bloated     62-year-old male, history of hypertension, total colectomy in the setting of FAP, complaining of abdominal pain x5 days, diffuse, constant, associated with anorexia and mild constipation, 1 episode of vomiting.  He feels bloated.  No fevers or chills, no urinary symptoms, no chest pain or shortness of breath.  Denies any alcohol use.    The history is provided by the patient.   Review of patient's allergies indicates:   Allergen Reactions    Codeine      Itching with Codeine , Oxycodone     Oxycodone      Itching      Past Medical History:   Diagnosis Date    GERD (gastroesophageal reflux disease)     Taking Protonix    Hyperlipidemia     Hypertension     Neuropathic pain of right thigh     History     Past Surgical History:   Procedure Laterality Date    COLONOSCOPY N/A 1/8/2018    Procedure: COLONOSCOPY;  Surgeon: Carson Yañez Jr., MD;  Location: [facility];  Service: Endoscopy;  Laterality: N/A;    COLONOSCOPY N/A 2/19/2018    Procedure: COLONOSCOPY/Polypectomy;  Surgeon: Rex Blanco MD;  Location: [facility];  Service: Endoscopy;  Laterality: N/A;    COLONOSCOPY N/A 5/21/2018    Procedure: COLONOSCOPY/Miralax Split;  Surgeon: Malvin Lozano MD;  Location: [facility];  Service: Endoscopy;  Laterality: N/A;    COLONOSCOPY N/A 6/11/2018    Procedure: COLONOSCOPY;  Surgeon: Papa Lopez MD;  Location: [facility] (4TH FLR);  Service: Endoscopy;  Laterality: N/A;  Miralax prep-ok per Dr Lopez    FLEXIBLE SIGMOIDOSCOPY N/A 3/11/2019    Procedure: SIGMOIDOSCOPY, FLEXIBLE;  Surgeon: Wilfred Rahman MD;  Location: [facility] (4TH FLR);  Service: Endoscopy;  Laterality: N/A;    INSERTION OF CATHETER Left 9/5/2018    Procedure: Insertion-Catheter;  Surgeon: Mohan Ventura MD;  Location: [facility] OR 2ND FLR;  Service: Urology;   Laterality: Left;    KNEE SURGERY Bilateral     states he has screws    KNEE SURGERY      3 reconstructive surgeries 20yrs go     LAPAROSCOPIC TOTAL COLECTOMY Left 9/5/2018    Procedure: COLECTOMY, TOTAL, LAPAROSCOPIC - lap total;  Surgeon: Papa Lopez MD;  Location: [facility] OR [address] [place] [address];  Service: Colon and Rectal;  Laterality: Left;  preop center  DIFFICULT INTUBATION GLIDESCOPE USED    WRIST SURGERY      2015     Family History   Problem Relation Age of Onset    Heart disease Mother 40    Cancer Father     Lung cancer Father     Hyperlipidemia Brother     Hypertension Brother     Anesthesia problems Neg Hx      Social History     Tobacco Use    Smoking status: Never    Smokeless tobacco: Never   Substance Use Topics    Alcohol use: No     Comment:  quit     Drug use: Yes     Types: Marijuana     Comment: a few times a week, restarted about 2 months ago     Review of Systems   Constitutional:  Positive for appetite change. Negative for chills and fever.   Respiratory:  Negative for shortness of breath.    Gastrointestinal:  Positive for abdominal distention, abdominal pain, constipation, nausea and vomiting. Negative for diarrhea.   Genitourinary:  Negative for difficulty urinating and dysuria.   All other systems reviewed and are negative.    Physical Exam     Initial Vitals [12/30/22 0728]   BP Pulse Resp Temp SpO2   (!) 134/97 102 18 98.5 °F (36.9 °C) 97 %      MAP       --         Physical Exam    Nursing note and vitals reviewed.  Constitutional: Vital signs are normal. He appears well-developed and well-nourished. He is not diaphoretic.  Non-toxic appearance. He does not appear ill. No distress.   HENT:   Head: Normocephalic and atraumatic.   Mouth/Throat: Oropharynx is clear and moist and mucous membranes are normal. Mucous membranes are not dry.   Eyes: Conjunctivae and lids are normal. No scleral icterus.   Neck: Neck supple.   Normal range of motion.  Cardiovascular:  Normal rate.            Pulmonary/Chest: No respiratory distress.   Abdominal: Abdomen is soft. He exhibits no distension. There is abdominal tenderness.   Mild-to-moderate tenderness greatest in the right upper and right lower quadrant There is no rebound and no guarding.   Musculoskeletal:         General: No edema.      Cervical back: Normal range of motion and neck supple.     Neurological: He is alert and oriented to person, place, and time.   Skin: Skin is dry and intact. No pallor.   Psychiatric: He has a normal mood and affect. His speech is normal and behavior is normal.       ED Course   Procedures  Labs Reviewed   COMPREHENSIVE METABOLIC PANEL - Abnormal; Notable for the following components:       Result Value    BUN 28 (*)     Total Bilirubin 5.2 (*)     All other components within normal limits   CBC W/ AUTO DIFFERENTIAL - Abnormal; Notable for the following components:    Mono % 17.1 (*)     All other components within normal limits   URINALYSIS, REFLEX TO URINE CULTURE - Abnormal; Notable for the following components:    Appearance, UA Hazy (*)     Protein, UA 2+ (*)     Occult Blood UA 1+ (*)     All other components within normal limits    Narrative:     Specimen Source->Urine   URINALYSIS MICROSCOPIC - Abnormal; Notable for the following components:    Hyaline Casts, UA 49 (*)     All other components within normal limits    Narrative:     Specimen Source->Urine   HIV 1 / 2 ANTIBODY    Narrative:     Release to patient->Immediate   HEPATITIS C ANTIBODY    Narrative:     Release to patient->Immediate   LIPASE   LACTIC ACID, PLASMA        ECG Results              EKG 12-lead (Final result)  Result time 12/30/22 08:23:18      Final result by Interface, Lab In [facility] (12/30/22 08:23:18)                   Narrative:    Test Reason : R10.9,    Vent. Rate : 099 BPM     Atrial Rate : 099 BPM     P-R Int : 154 ms          QRS Dur : 090 ms      QT Int : 348 ms       P-R-T Axes : 069 -02 067 degrees     QTc Int : 446  ms    Normal sinus rhythm  Nonspecific ST and/or T wave abnormalities  borderline LVH  Abnormal ECG  When compared with ECG of 13-JAN-2016 14:45,  Vent. rate has increased BY  38 BPM  T wave inversion no longer evident in Inferior leads  Confirmed by Bo Owen MD ([id number]) on 12/30/2022 8:23:09 AM    Referred By:             Confirmed By:Bo Owen MD                                  Imaging Results               CT Abdomen Pelvis With Contrast (Final result)  Result time 12/30/22 10:16:27      Final result by Ismael Weems IV, MD (12/30/22 10:16:27)                   Impression:      Dilated loops of ileum with air-fluid levels concerning for small bowel obstruction noting small amount of surrounding free fluid/edema as above.  No evidence of bowel perforation.  Recommend surgical consultation.    Postsurgical change status post total colectomy.    Nonobstructing subcentimeter right-sided nephrolithiasis.    Additional findings as above.    This report was flagged in Epic as abnormal.    Electronically signed by resident: Dee Dee Bills  Date:    12/30/2022  Time:    09:33    Electronically signed by: Ismael Weems  Date:    12/30/2022  Time:    10:16               Narrative:    EXAMINATION:  CT ABDOMEN PELVIS WITH CONTRAST    CLINICAL HISTORY:  RLQ abdominal pain (Age >= 14y);    TECHNIQUE:  Low dose axial images, sagittal and coronal reformations were obtained from the lung bases to the pubic symphysis following the IV administration of 100 mL of Omnipaque 350.  Oral contrast was not administered.    COMPARISON:  Complete abdominal ultrasound 11/28/2018.    FINDINGS:  Heart: Normal size. No effusion.    Lung Bases: Minimal dependent atelectasis in the left lung base.    Liver: Normal size and attenuation. No focal suspicious lesion.    Gallbladder: No calcified gallstones.    Bile Ducts: No dilatation.    Pancreas: No mass. No peripancreatic fat stranding.    Spleen: 2.8 cm simple cyst (axial  series 2, image 30), stable compared to prior ultrasound in 2018 allowing for differences in technique.    Adrenals: Normal.    Kidneys/Ureters: Left simple renal cyst.  Additional subcentimeter hypodensities in the bilateral kidneys, too small to characterize.  0.6 cm nonobstructing right renal stone.  No hydronephrosis.  Bilateral ureters are normal in course and caliber.    Bladder: Decompressed and not well evaluated.    Reproductive organs: Mild prostatomegaly.    GI Tract/Mesentery: Postsurgical changes of total colectomy.  Dilated loops of ileum with air-fluid levels measuring up to 5.4 cm in diameter with possible transition point right lower quadrant pain axial series 2, image 123 and left lower quadrant just proximal to the ileorectal anastomosis (axial series 2, image 104).  Proximal small bowel is decompressed.    Peritoneal Space: Trace free fluid surrounding the dilated loops of bowel and layering dependently in the pelvis.  No free intraperitoneal air.    Retroperitoneum: No significant adenopathy.    Abdominal wall: Normal.    Vasculature: Moderate atherosclerotic plaque.  No aneurysm.    Bones: Remote fracture of the right posterior 8th rib and remote deformity within right superior pubic ramus/pubic symphysis.  Degenerative changes most notable in the lumbar spine and right SI joint.  No acute fracture.                                       Medications   sodium chloride 0.9% flush 10 mL (has no administration in time range)   melatonin tablet 6 mg (has no administration in time range)   morphine injection 2 mg (has no administration in time range)   morphine injection 4 mg (has no administration in time range)   ondansetron injection 4 mg (has no administration in time range)   prochlorperazine injection Soln 5 mg (has no administration in time range)   lactated ringers infusion (has no administration in time range)   lactated ringers bolus 1,000 mL (0 mLs Intravenous Stopped 12/30/22 1203)    HYDROmorphone injection 0.5 mg (0.5 mg Intravenous Given 12/30/22 2756)   ondansetron injection 8 mg (8 mg Intravenous Given 12/30/22 0907)   iohexoL (OMNIPAQUE 350) injection 100 mL (100 mLs Intravenous Given 12/30/22 0918)   LIDOcaine HCl 2% urojet (10 mLs Mucous Membrane Given 12/30/22 1258)     Medical Decision Making:   History:   Old Medical Records: I decided to obtain old medical records.  Initial Assessment:   Patient is well-appearing clinically  Abdominal exam is noted  Vital signs stable  Differential Diagnosis:   DDx for abdominal pain would include cholecystitis, appendicitis, diverticulitis, pancreatitis, cholangitis, hepatitis, bowel perforation or obstruction, volvulus, gastritis, renal colic, vascular catastrophe like AAA, aortic dissection, or mesenteric ischemia.  Other less dangerous problems such as gastroparesis, cyclic vomiting syndrome, and constipation are also possible.    Clinical Tests:   Lab Tests: Ordered and Reviewed  Radiological Study: Reviewed and Ordered  Medical Tests: Ordered and Reviewed  ED Management:  Labs  IV fluids  CT abdomen pelvis  Dispo uncertain pending test results  Other:   I have discussed this case with another health care provider.       <> Summary of the Discussion: General surgery resident          Attending Attestation:         Attending Critical Care:   Critical Care Times:   ==============================================================  Total Critical Care Time - exclusive of procedural time: 35 minutes.  ==============================================================  Critical care reasons: Acute bowel obstruction.   The following critical care procedures were done by me (see procedure notes): pulse oximetry.   Critical care was time spent personally by me on the following activities: obtaining history from patient or relative, examination of patient, review of old charts, review of x-rays / CT sent with the patient, ordering lab, x-rays, and/or EKG,  development of treatment plan with patient or relative, ordering and performing treatments and interventions, evaluation of patient's response to treatment, discussion with consultants and re-evaluation of patient's conition.   Critical Care Condition: potentially life-threatening         ED Course as of 12/30/22 1305   Fri Dec 30, 2022   1057 WBC: 5.85 [AS]   1057 Lactate, Mauri: 1.2 [AS]   1057 BILIRUBIN TOTAL(!): 5.2 [AS]   1057 CT consistent with SBO.  NG tube ordered.  General surgery consulted for admission.  Patient updated on results. [AS]      ED Course User Index  [AS] Vanessa Mendoza MD                 Clinical Impression:   Final diagnoses:  [R10.9] Abdominal pain  [K56.609] Intestinal obstruction, unspecified cause, unspecified whether partial or complete (Primary)        ED Disposition Condition    Observation                 Vanessa Mendoza MD  12/30/22 2655

## 2022-12-30 NOTE — H&P
[facility] - Emergency Dept  General Surgery  History & Physical    Patient Name: Jenni Prescott Jr.  MRN: [id number]  Admission Date: 12/30/2022  Attending Physician: Vanessa Mendoza MD   Primary Care Provider: Radha Antoine MD    Patient information was obtained from patient and ER records.     Subjective:     Chief Complaint/Reason for Admission: pSBO    History of Present Illness: Mr. Prescott is a 62 yoM with PMH including total colectomy with ileorectal anastomosis for polyposis in 2018 who presents to the ED with 1 week of worsening bloating, abdominal pain,and nausea/vomiting. Workup in the ED revealed relatively benign lab work and vital signs with a CT scan significant for potential transition point of the ileum in the RLQ with ileal distension and decompression of the proximal small bowel and stomach.     He had a BM this morning however continues to have nausea. Last emesis was on Wednesday. He has never had symptoms like this before. He endorses generalized abdominal pain and bloating throughout the abdomen. No fever, chills, chest pain, or shortness of breath.      Current Facility-Administered Medications on File Prior to Encounter   Medication    0.9%  NaCl infusion    dextrose 5 % and 0.45 % NaCl with KCl 20 mEq infusion    dextrose 50% injection 12.5 g    dextrose 50% injection 12.5 g    dextrose 50% injection 25 g    enoxaparin injection 40 mg    gabapentin capsule 300 mg    gabapentin capsule 300 mg    glucagon (human recombinant) injection 1 mg    glucose chewable tablet 16 g    glucose chewable tablet 16 g    glucose chewable tablet 24 g    ibuprofen (CALDOLOR) 800mg/250mL D5W (READY TO MIX SYSTEM)    naloxone 0.4 mg/mL injection 0.02 mg    ondansetron injection 4 mg    promethazine (PHENERGAN) 6.25 mg in dextrose 5 % 50 mL IVPB    sodium chloride 0.9% flush 3 mL     Current Outpatient Medications on File Prior to Encounter   Medication Sig    atorvastatin (LIPITOR) 20  MG tablet Take 1 tablet (20 mg total) by mouth once daily.    lisinopriL-hydrochlorothiazide (PRINZIDE,ZESTORETIC) 20-12.5 mg per tablet Take 1 tablet by mouth once daily.    pantoprazole (PROTONIX) 40 MG tablet TAKE 1 TABLET(40 MG) BY MOUTH EVERY DAY       Review of patient's allergies indicates:   Allergen Reactions    Codeine      Itching with Codeine , Oxycodone     Oxycodone      Itching        Past Medical History:   Diagnosis Date    GERD (gastroesophageal reflux disease)     Taking Protonix    Hyperlipidemia     Hypertension     Neuropathic pain of right thigh     History     Past Surgical History:   Procedure Laterality Date    COLONOSCOPY N/A 1/8/2018    Procedure: COLONOSCOPY;  Surgeon: Carson Yañez Jr., MD;  Location: [facility];  Service: Endoscopy;  Laterality: N/A;    COLONOSCOPY N/A 2/19/2018    Procedure: COLONOSCOPY/Polypectomy;  Surgeon: Rex Blanco MD;  Location: [facility];  Service: Endoscopy;  Laterality: N/A;    COLONOSCOPY N/A 5/21/2018    Procedure: COLONOSCOPY/Miralax Split;  Surgeon: Malvin Lozano MD;  Location: [facility];  Service: Endoscopy;  Laterality: N/A;    COLONOSCOPY N/A 6/11/2018    Procedure: COLONOSCOPY;  Surgeon: Papa Lopez MD;  Location: [facility] (4TH FLR);  Service: Endoscopy;  Laterality: N/A;  Miralax prep-ok per Dr Lopez    FLEXIBLE SIGMOIDOSCOPY N/A 3/11/2019    Procedure: SIGMOIDOSCOPY, FLEXIBLE;  Surgeon: Wilfred Rahman MD;  Location: [facility] (4TH FLR);  Service: Endoscopy;  Laterality: N/A;    INSERTION OF CATHETER Left 9/5/2018    Procedure: Insertion-Catheter;  Surgeon: Mohan Ventura MD;  Location: [facility] 2ND FLR;  Service: Urology;  Laterality: Left;    KNEE SURGERY Bilateral     states he has screws    KNEE SURGERY      3 reconstructive surgeries 20yrs go     LAPAROSCOPIC TOTAL COLECTOMY Left 9/5/2018    Procedure: COLECTOMY, TOTAL, LAPAROSCOPIC - lap total;  Surgeon: Papa Lopez MD;  Location: [facility]  2ND FLR;  Service: Colon and Rectal;  Laterality: Left;  preop center  DIFFICULT INTUBATION GLIDESCOPE USED    WRIST SURGERY      2015     Family History       Problem Relation (Age of Onset)    Cancer Father    Heart disease Mother (40)    Hyperlipidemia Brother    Hypertension Brother    Lung cancer Father          Tobacco Use    Smoking status: Never    Smokeless tobacco: Never   Substance and Sexual Activity    Alcohol use: No     Comment:  quit     Drug use: Yes     Types: Marijuana     Comment: a few times a week, restarted about 2 months ago    Sexual activity: Yes     Partners: Female     Review of Systems   Constitutional:  Negative for chills and fever.   Respiratory:  Negative for cough and shortness of breath.    Cardiovascular:  Negative for chest pain.   Gastrointestinal:  Positive for abdominal distention, abdominal pain, nausea and vomiting.   Neurological:  Negative for dizziness and light-headedness.   Objective:     Vital Signs (Most Recent):  Temp: 98.7 °F (37.1 °C) (12/30/22 1113)  Pulse: 71 (12/30/22 1113)  Resp: 16 (12/30/22 1113)  BP: 124/86 (12/30/22 1113)  SpO2: 99 % (12/30/22 1113) Vital Signs (24h Range):  Temp:  [98.4 °F (36.9 °C)-98.7 °F (37.1 °C)] 98.7 °F (37.1 °C)  Pulse:  [] 71  Resp:  [16-18] 16  SpO2:  [97 %-99 %] 99 %  BP: (124-146)/(84-97) 124/86     Weight: 83.9 kg (185 lb)  Body mass index is 26.54 kg/m².    Physical Exam  Vitals reviewed.   Constitutional:       Appearance: Normal appearance.   Cardiovascular:      Rate and Rhythm: Normal rate.      Pulses: Normal pulses.   Pulmonary:      Effort: Pulmonary effort is normal.   Abdominal:      General: There is distension.      Palpations: Abdomen is soft.      Tenderness: There is abdominal tenderness. There is no guarding or rebound.   Skin:     General: Skin is warm and dry.   Neurological:      General: No focal deficit present.      Mental Status: He is alert and oriented to person, place, and time.        Significant Labs:  I have reviewed all pertinent lab results within the past 24 hours.  CBC:   Recent Labs   Lab 12/30/22  0822   WBC 5.85   RBC 5.77   HGB 17.1   HCT 50.9      MCV 88   MCH 29.6   MCHC 33.6     CMP:   Recent Labs   Lab 12/30/22  0822      CALCIUM 9.2   ALBUMIN 4.2   PROT 7.5      K 3.6   CO2 29   CL 96   BUN 28*   CREATININE 1.3   ALKPHOS 76   ALT 31   AST 22   BILITOT 5.2*       Significant Diagnostics:  I have reviewed all pertinent imaging results/findings within the past 24 hours.      Assessment/Plan:     SBO (small bowel obstruction)  63 yo male with concern of partial SBO. We will admit to observation status with plans for GGC.    - Admit to general surgery under observation status  - Gastrografin challenge  - NPO  - mIVF  - IV pain and nausea prns  - Activity as tolerated      VTE Risk Mitigation (From admission, onward)    None          Javy Kern MD  General Surgery  [facility] - Emergency Dept

## 2022-12-30 NOTE — ED NOTES
Bed: 24  Expected date: 12/30/22  Expected time: 11:36 AM  Means of arrival:   Comments:  CCR 10

## 2022-12-30 NOTE — HPI
Mr. Prescott is a 62 yoM with PMH including total colectomy with ileorectal anastomosis for polyposis in 2018 who presents to the ED with 1 week of worsening bloating, abdominal pain,and nausea/vomiting. Workup in the ED revealed relatively benign lab work and vital signs with a CT scan significant for potential transition point of the ileum in the RLQ with ileal distension and decompression of the proximal small bowel and stomach.     He had a BM this morning however continues to have nausea. Last emesis was on Wednesday. He has never had symptoms like this before. He endorses generalized abdominal pain and bloating throughout the abdomen. No fever, chills, chest pain, or shortness of breath.

## 2022-12-30 NOTE — ED NOTES
Pt unable to tolerate NG tube insertion. Attempt made to right nare x2 without success. Attempt made to left nare without success, bleeding from left nare noted. General surgery paged to notify.

## 2022-12-30 NOTE — ASSESSMENT & PLAN NOTE
61 yo male with concern of partial SBO. We will admit to observation status with plans for GGC.    - Admit to general surgery under observation status  - Gastrografin challenge  - NPO  - mIVF  - IV pain and nausea prns  - Activity as tolerated

## 2022-12-30 NOTE — PLAN OF CARE
Pt aox4 on RA, patient verbalizes understanding of POC.    Problem: Adult Inpatient Plan of Care  Goal: Plan of Care Review  Outcome: Ongoing, Progressing  Goal: Patient-Specific Goal (Individualized)  Outcome: Ongoing, Progressing  Goal: Absence of Hospital-Acquired Illness or Injury  Outcome: Ongoing, Progressing  Goal: Optimal Comfort and Wellbeing  Outcome: Ongoing, Progressing  Goal: Readiness for Transition of Care  Outcome: Ongoing, Progressing

## 2022-12-30 NOTE — ED TRIAGE NOTES
Bloating that began on Monday. Pain radiates from lower abdomen up to his chest. MD instructed patient to come to ED for a CT scan.

## 2022-12-30 NOTE — SUBJECTIVE & OBJECTIVE
Current Facility-Administered Medications on File Prior to Encounter   Medication    0.9%  NaCl infusion    dextrose 5 % and 0.45 % NaCl with KCl 20 mEq infusion    dextrose 50% injection 12.5 g    dextrose 50% injection 12.5 g    dextrose 50% injection 25 g    enoxaparin injection 40 mg    gabapentin capsule 300 mg    gabapentin capsule 300 mg    glucagon (human recombinant) injection 1 mg    glucose chewable tablet 16 g    glucose chewable tablet 16 g    glucose chewable tablet 24 g    ibuprofen (CALDOLOR) 800mg/250mL D5W (READY TO MIX SYSTEM)    naloxone 0.4 mg/mL injection 0.02 mg    ondansetron injection 4 mg    promethazine (PHENERGAN) 6.25 mg in dextrose 5 % 50 mL IVPB    sodium chloride 0.9% flush 3 mL     Current Outpatient Medications on File Prior to Encounter   Medication Sig    atorvastatin (LIPITOR) 20 MG tablet Take 1 tablet (20 mg total) by mouth once daily.    lisinopriL-hydrochlorothiazide (PRINZIDE,ZESTORETIC) 20-12.5 mg per tablet Take 1 tablet by mouth once daily.    pantoprazole (PROTONIX) 40 MG tablet TAKE 1 TABLET(40 MG) BY MOUTH EVERY DAY       Review of patient's allergies indicates:   Allergen Reactions    Codeine      Itching with Codeine , Oxycodone     Oxycodone      Itching        Past Medical History:   Diagnosis Date    GERD (gastroesophageal reflux disease)     Taking Protonix    Hyperlipidemia     Hypertension     Neuropathic pain of right thigh     History     Past Surgical History:   Procedure Laterality Date    COLONOSCOPY N/A 1/8/2018    Procedure: COLONOSCOPY;  Surgeon: Carson Yañez Jr., MD;  Location: [facility];  Service: Endoscopy;  Laterality: N/A;    COLONOSCOPY N/A 2/19/2018    Procedure: COLONOSCOPY/Polypectomy;  Surgeon: Rex Blanco MD;  Location: [facility];  Service: Endoscopy;  Laterality: N/A;    COLONOSCOPY N/A 5/21/2018    Procedure: COLONOSCOPY/Miralax Split;  Surgeon: Malvin Lozano MD;  Location: [facility];  Service: Endoscopy;  Laterality:  N/A;    COLONOSCOPY N/A 6/11/2018    Procedure: COLONOSCOPY;  Surgeon: Papa Lopez MD;  Location: [facility] ENDO (4TH FLR);  Service: Endoscopy;  Laterality: N/A;  Miralax prep-ok per Dr Lopez    FLEXIBLE SIGMOIDOSCOPY N/A 3/11/2019    Procedure: SIGMOIDOSCOPY, FLEXIBLE;  Surgeon: Wilfred Rahman MD;  Location: [facility] ENDO (4TH FLR);  Service: Endoscopy;  Laterality: N/A;    INSERTION OF CATHETER Left 9/5/2018    Procedure: Insertion-Catheter;  Surgeon: Mohan Ventura MD;  Location: [facility] OR 2ND FLR;  Service: Urology;  Laterality: Left;    KNEE SURGERY Bilateral     states he has screws    KNEE SURGERY      3 reconstructive surgeries 20yrs go     LAPAROSCOPIC TOTAL COLECTOMY Left 9/5/2018    Procedure: COLECTOMY, TOTAL, LAPAROSCOPIC - lap total;  Surgeon: Papa Lopez MD;  [facility];  Service: Colon and Rectal;  Laterality: Left;  preop center  DIFFICULT INTUBATION GLIDESCOPE USED    WRIST SURGERY      2015     Family History       Problem Relation (Age of Onset)    Cancer Father    Heart disease Mother (40)    Hyperlipidemia Brother    Hypertension Brother    Lung cancer Father          Tobacco Use    Smoking status: Never    Smokeless tobacco: Never   Substance and Sexual Activity    Alcohol use: No     Comment:  quit     Drug use: Yes     Types: Marijuana     Comment: a few times a week, restarted about 2 months ago    Sexual activity: Yes     Partners: Female     Review of Systems   Constitutional:  Negative for chills and fever.   Respiratory:  Negative for cough and shortness of breath.    Cardiovascular:  Negative for chest pain.   Gastrointestinal:  Positive for abdominal distention, abdominal pain, nausea and vomiting.   Neurological:  Negative for dizziness and light-headedness.   Objective:     Vital Signs (Most Recent):  Temp: 98.7 °F (37.1 °C) (12/30/22 1113)  Pulse: 71 (12/30/22 1113)  Resp: 16 (12/30/22 1113)  BP: 124/86 (12/30/22 1113)  SpO2: 99 % (12/30/22 1113) Vital  Signs (24h Range):  Temp:  [98.4 °F (36.9 °C)-98.7 °F (37.1 °C)] 98.7 °F (37.1 °C)  Pulse:  [] 71  Resp:  [16-18] 16  SpO2:  [97 %-99 %] 99 %  BP: (124-146)/(84-97) 124/86     Weight: 83.9 kg (185 lb)  Body mass index is 26.54 kg/m².    Physical Exam  Vitals reviewed.   Constitutional:       Appearance: Normal appearance.   Cardiovascular:      Rate and Rhythm: Normal rate.      Pulses: Normal pulses.   Pulmonary:      Effort: Pulmonary effort is normal.   Abdominal:      General: There is distension.      Palpations: Abdomen is soft.      Tenderness: There is abdominal tenderness. There is no guarding or rebound.   Skin:     General: Skin is warm and dry.   Neurological:      General: No focal deficit present.      Mental Status: He is alert and oriented to person, place, and time.       Significant Labs:  I have reviewed all pertinent lab results within the past 24 hours.  CBC:   Recent Labs   Lab 12/30/22  0822   WBC 5.85   RBC 5.77   HGB 17.1   HCT 50.9      MCV 88   MCH 29.6   MCHC 33.6     CMP:   Recent Labs   Lab 12/30/22  0822      CALCIUM 9.2   ALBUMIN 4.2   PROT 7.5      K 3.6   CO2 29   CL 96   BUN 28*   CREATININE 1.3   ALKPHOS 76   ALT 31   AST 22   BILITOT 5.2*       Significant Diagnostics:  I have reviewed all pertinent imaging results/findings within the past 24 hours.

## 2022-12-31 VITALS
HEIGHT: 70 IN | TEMPERATURE: 98 F | WEIGHT: 193.81 LBS | HEART RATE: 62 BPM | RESPIRATION RATE: 16 BRPM | BODY MASS INDEX: 27.75 KG/M2 | DIASTOLIC BLOOD PRESSURE: 99 MMHG | OXYGEN SATURATION: 94 % | SYSTOLIC BLOOD PRESSURE: 168 MMHG

## 2022-12-31 LAB
ALBUMIN SERPL BCP-MCNC: 3.6 G/DL (ref 3.5–5.2)
ALP SERPL-CCNC: 66 U/L (ref 55–135)
ALT SERPL W/O P-5'-P-CCNC: 27 U/L (ref 10–44)
ANION GAP SERPL CALC-SCNC: 11 MMOL/L (ref 8–16)
AST SERPL-CCNC: 18 U/L (ref 10–40)
BASOPHILS # BLD AUTO: 0.03 K/UL (ref 0–0.2)
BASOPHILS NFR BLD: 0.7 % (ref 0–1.9)
BILIRUB SERPL-MCNC: 3.9 MG/DL (ref 0.1–1)
BUN SERPL-MCNC: 20 MG/DL (ref 6–20)
CALCIUM SERPL-MCNC: 8.3 MG/DL (ref 8.7–10.5)
CHLORIDE SERPL-SCNC: 102 MMOL/L (ref 95–110)
CO2 SERPL-SCNC: 24 MMOL/L (ref 23–29)
CREAT SERPL-MCNC: 0.7 MG/DL (ref 0.5–1.4)
DIFFERENTIAL METHOD: ABNORMAL
EOSINOPHIL # BLD AUTO: 0.1 K/UL (ref 0–0.5)
EOSINOPHIL NFR BLD: 2 % (ref 0–8)
ERYTHROCYTE [DISTWIDTH] IN BLOOD BY AUTOMATED COUNT: 13.2 % (ref 11.5–14.5)
EST. GFR  (NO RACE VARIABLE): >60 ML/MIN/1.73 M^2
GLUCOSE SERPL-MCNC: 81 MG/DL (ref 70–110)
HCT VFR BLD AUTO: 47.1 % (ref 40–54)
HGB BLD-MCNC: 15.3 G/DL (ref 14–18)
IMM GRANULOCYTES # BLD AUTO: 0.01 K/UL (ref 0–0.04)
IMM GRANULOCYTES NFR BLD AUTO: 0.2 % (ref 0–0.5)
LYMPHOCYTES # BLD AUTO: 1.4 K/UL (ref 1–4.8)
LYMPHOCYTES NFR BLD: 31.5 % (ref 18–48)
MAGNESIUM SERPL-MCNC: 2.2 MG/DL (ref 1.6–2.6)
MCH RBC QN AUTO: 29.9 PG (ref 27–31)
MCHC RBC AUTO-ENTMCNC: 32.5 G/DL (ref 32–36)
MCV RBC AUTO: 92 FL (ref 82–98)
MONOCYTES # BLD AUTO: 0.8 K/UL (ref 0.3–1)
MONOCYTES NFR BLD: 18.1 % (ref 4–15)
NEUTROPHILS # BLD AUTO: 2.1 K/UL (ref 1.8–7.7)
NEUTROPHILS NFR BLD: 47.5 % (ref 38–73)
NRBC BLD-RTO: 0 /100 WBC
PHOSPHATE SERPL-MCNC: 3 MG/DL (ref 2.7–4.5)
PLATELET # BLD AUTO: 197 K/UL (ref 150–450)
PMV BLD AUTO: 10.3 FL (ref 9.2–12.9)
POTASSIUM SERPL-SCNC: 3.2 MMOL/L (ref 3.5–5.1)
PROT SERPL-MCNC: 6.2 G/DL (ref 6–8.4)
RBC # BLD AUTO: 5.11 M/UL (ref 4.6–6.2)
SODIUM SERPL-SCNC: 137 MMOL/L (ref 136–145)
WBC # BLD AUTO: 4.47 K/UL (ref 3.9–12.7)

## 2022-12-31 PROCEDURE — 63600175 PHARM REV CODE 636 W HCPCS

## 2022-12-31 PROCEDURE — 84100 ASSAY OF PHOSPHORUS: CPT

## 2022-12-31 PROCEDURE — 85025 COMPLETE CBC W/AUTO DIFF WBC: CPT

## 2022-12-31 PROCEDURE — 80053 COMPREHEN METABOLIC PANEL: CPT

## 2022-12-31 PROCEDURE — 96361 HYDRATE IV INFUSION ADD-ON: CPT

## 2022-12-31 PROCEDURE — 83735 ASSAY OF MAGNESIUM: CPT

## 2022-12-31 PROCEDURE — G0378 HOSPITAL OBSERVATION PER HR: HCPCS

## 2022-12-31 PROCEDURE — 36415 COLL VENOUS BLD VENIPUNCTURE: CPT

## 2022-12-31 PROCEDURE — 25000003 PHARM REV CODE 250

## 2022-12-31 RX ORDER — POTASSIUM CHLORIDE 20 MEQ/1
40 TABLET, EXTENDED RELEASE ORAL ONCE
Status: COMPLETED | OUTPATIENT
Start: 2022-12-31 | End: 2022-12-31

## 2022-12-31 RX ADMIN — SODIUM CHLORIDE, POTASSIUM CHLORIDE, SODIUM LACTATE AND CALCIUM CHLORIDE: 600; 310; 30; 20 INJECTION, SOLUTION INTRAVENOUS at 06:12

## 2022-12-31 RX ADMIN — POTASSIUM CHLORIDE 40 MEQ: 1500 TABLET, EXTENDED RELEASE ORAL at 08:12

## 2022-12-31 NOTE — PLAN OF CARE
Pt discharged home with family.  Discharge instructions given with teach back.  PIV removed.  Pt walked down, did not want to wait for transport.

## 2022-12-31 NOTE — DISCHARGE SUMMARY
[facility]  General Surgery  Discharge Summary      Patient Name: Jenni Prescott Jr.  MRN: [id number]  Admission Date: 12/30/2022  Hospital Length of Stay: 0 days  Discharge Date and Time:  12/31/2022 9:13 AM  Attending Physician: Garrick Hennessy MD   Discharging Provider: ARIAN Tobar MD  Primary Care Provider: Radha Antoine MD    HPI:   Mr. Prescott is a 62 yoM with PMH including total colectomy with ileorectal anastomosis for polyposis in 2018 who presents to the ED with 1 week of worsening bloating, abdominal pain,and nausea/vomiting. Workup in the ED revealed relatively benign lab work and vital signs with a CT scan significant for potential transition point of the ileum in the RLQ with ileal distension and decompression of the proximal small bowel and stomach.     He had a BM this morning however continues to have nausea. Last emesis was on Wednesday. He has never had symptoms like this before. He endorses generalized abdominal pain and bloating throughout the abdomen. No fever, chills, chest pain, or shortness of breath.      * No surgery found *      Indwelling Lines/Drains at time of discharge:   Lines/Drains/Airways     None               Hospital Course: SBO  GGC  Passed  Conditional discharge set for 1pm if tolerates diet as expected      Goals of Care Treatment Preferences:  Code Status: Full Code      Consults:   Consults (From admission, onward)        Status Ordering Provider     Inpatient consult to General Surgery  Once        Provider:  (Not yet assigned)    Completed ISABEL CARREON          Significant Diagnostic Studies: XR shows contrast in rectum after GGC    Pending Diagnostic Studies:     None        Final Active Diagnoses:    Diagnosis Date Noted POA    PRINCIPAL PROBLEM:  SBO (small bowel obstruction) [K56.609] 12/30/2022 Yes      Problems Resolved During this Admission:      Discharged Condition: good    Disposition: Home or Self Care    Follow Up:    Patient  Instructions:   No discharge procedures on file.  Medications:  Reconciled Home Medications:      Medication List      CONTINUE taking these medications    atorvastatin 20 MG tablet  Commonly known as: LIPITOR  Take 1 tablet (20 mg total) by mouth once daily.     lisinopriL-hydrochlorothiazide 20-12.5 mg per tablet  Commonly known as: PRINZIDE,ZESTORETIC  Take 1 tablet by mouth once daily.     pantoprazole 40 MG tablet  Commonly known as: PROTONIX  TAKE 1 TABLET(40 MG) BY MOUTH EVERY DAY          Time spent on the discharge of patient: 15 minutes    ARIAN Tobar MD  General Surgery  [facility]

## 2022-12-31 NOTE — PROGRESS NOTES
[facility]  General Surgery  Progress Note    Subjective:     History of Present Illness:  Mr. Prescott is a 62 yoM with PMH including total colectomy with ileorectal anastomosis for polyposis in 2018 who presents to the ED with 1 week of worsening bloating, abdominal pain,and nausea/vomiting. Workup in the ED revealed relatively benign lab work and vital signs with a CT scan significant for potential transition point of the ileum in the RLQ with ileal distension and decompression of the proximal small bowel and stomach.     He had a BM this morning however continues to have nausea. Last emesis was on Wednesday. He has never had symptoms like this before. He endorses generalized abdominal pain and bloating throughout the abdomen. No fever, chills, chest pain, or shortness of breath.      Post-Op Info:  * No surgery found *         Interval History:   multiple BM overnight  Contrast in rectum  Complainning of abdominal pain due to frequency of BM    Medications:  Continuous Infusions:   lactated ringers 125 mL/hr at 12/31/22 0600     Scheduled Meds:   diatrizoate meglumineand-diatrizoate sodium  100 mL Oral Once     PRN Meds:melatonin, morphine, morphine, ondansetron, prochlorperazine, sodium chloride 0.9%     Review of patient's allergies indicates:   Allergen Reactions    Codeine      Itching with Codeine , Oxycodone     Oxycodone      Itching      Objective:     Vital Signs (Most Recent):  Temp: 97.7 °F (36.5 °C) (12/31/22 0742)  Pulse: 62 (12/31/22 0742)  Resp: 16 (12/31/22 0742)  BP: (!) 168/99 (12/31/22 0742)  SpO2: (!) 94 % (12/31/22 0742)   Vital Signs (24h Range):  Temp:  [97.7 °F (36.5 °C)-98.7 °F (37.1 °C)] 97.7 °F (36.5 °C)  Pulse:  [56-84] 62  Resp:  [16-18] 16  SpO2:  [94 %-99 %] 94 %  BP: (124-168)/(84-99) 168/99     Weight: 87.9 kg (193 lb 12.8 oz)  Body mass index is 27.81 kg/m².    Intake/Output - Last 3 Shifts         12/29 0700 12/30 0659 12/30 0700 12/31 0659 12/31 0700 01/01 0659     I.V. (mL/kg)  1366.1 (15.5)     Total Intake(mL/kg)  1366.1 (15.5)     Net  +1366.1            Urine Occurrence  2 x     Stool Occurrence  1 x             Physical Exam  Constitutional:       Appearance: Normal appearance.   HENT:      Head: Normocephalic.   Cardiovascular:      Rate and Rhythm: Normal rate.   Pulmonary:      Effort: Pulmonary effort is normal. No respiratory distress.   Abdominal:      General: Abdomen is flat. There is no distension.      Tenderness: There is no abdominal tenderness.      Hernia: No hernia is present.      Comments: Mild tenderness from frequent BM   Skin:     General: Skin is warm and dry.      Capillary Refill: Capillary refill takes less than 2 seconds.   Neurological:      General: No focal deficit present.      Mental Status: He is alert.   Psychiatric:         Mood and Affect: Mood normal.       Significant Labs:  I have reviewed all pertinent lab results within the past 24 hours.  CBC:   Recent Labs   Lab 12/31/22  0456   WBC 4.47   RBC 5.11   HGB 15.3   HCT 47.1      MCV 92   MCH 29.9   MCHC 32.5     CMP:   Recent Labs   Lab 12/31/22  0456   GLU 81   CALCIUM 8.3*   ALBUMIN 3.6   PROT 6.2      K 3.2*   CO2 24      BUN 20   CREATININE 0.7   ALKPHOS 66   ALT 27   AST 18   BILITOT 3.9*       Significant Diagnostics:  I have reviewed all pertinent imaging results/findings within the past 24 hours.    XR with contrast in rectum after GGC    Assessment/Plan:     SBO (small bowel obstruction)  61 yo male with concern of partial SBO.   Passed GGC  Regular diet  DC after lunch if toelrates        W Christiano Tobar MD  General Surgery  [facility]

## 2022-12-31 NOTE — SUBJECTIVE & OBJECTIVE
Interval History:   multiple BM overnight  Contrast in rectum  Complainning of abdominal pain due to frequency of BM    Medications:  Continuous Infusions:   lactated ringers 125 mL/hr at 12/31/22 0600     Scheduled Meds:   diatrizoate meglumineand-diatrizoate sodium  100 mL Oral Once     PRN Meds:melatonin, morphine, morphine, ondansetron, prochlorperazine, sodium chloride 0.9%     Review of patient's allergies indicates:   Allergen Reactions    Codeine      Itching with Codeine , Oxycodone     Oxycodone      Itching      Objective:     Vital Signs (Most Recent):  Temp: 97.7 °F (36.5 °C) (12/31/22 0742)  Pulse: 62 (12/31/22 0742)  Resp: 16 (12/31/22 0742)  BP: (!) 168/99 (12/31/22 0742)  SpO2: (!) 94 % (12/31/22 0742)   Vital Signs (24h Range):  Temp:  [97.7 °F (36.5 °C)-98.7 °F (37.1 °C)] 97.7 °F (36.5 °C)  Pulse:  [56-84] 62  Resp:  [16-18] 16  SpO2:  [94 %-99 %] 94 %  BP: (124-168)/(84-99) 168/99     Weight: 87.9 kg (193 lb 12.8 oz)  Body mass index is 27.81 kg/m².    Intake/Output - Last 3 Shifts         12/29 0700 12/30 0659 12/30 0700 12/31 0659 12/31 0700 01/01 0659    I.V. (mL/kg)  1366.1 (15.5)     Total Intake(mL/kg)  1366.1 (15.5)     Net  +1366.1            Urine Occurrence  2 x     Stool Occurrence  1 x             Physical Exam  Constitutional:       Appearance: Normal appearance.   HENT:      Head: Normocephalic.   Cardiovascular:      Rate and Rhythm: Normal rate.   Pulmonary:      Effort: Pulmonary effort is normal. No respiratory distress.   Abdominal:      General: Abdomen is flat. There is no distension.      Tenderness: There is no abdominal tenderness.      Hernia: No hernia is present.      Comments: Mild tenderness from frequent BM   Skin:     General: Skin is warm and dry.      Capillary Refill: Capillary refill takes less than 2 seconds.   Neurological:      General: No focal deficit present.      Mental Status: He is alert.   Psychiatric:         Mood and Affect: Mood normal.        Significant Labs:  I have reviewed all pertinent lab results within the past 24 hours.  CBC:   Recent Labs   Lab 12/31/22 0456   WBC 4.47   RBC 5.11   HGB 15.3   HCT 47.1      MCV 92   MCH 29.9   MCHC 32.5     CMP:   Recent Labs   Lab 12/31/22  0456   GLU 81   CALCIUM 8.3*   ALBUMIN 3.6   PROT 6.2      K 3.2*   CO2 24      BUN 20   CREATININE 0.7   ALKPHOS 66   ALT 27   AST 18   BILITOT 3.9*       Significant Diagnostics:  I have reviewed all pertinent imaging results/findings within the past 24 hours.    XR with contrast in rectum after GGC

## 2023-01-09 ENCOUNTER — TELEPHONE (OUTPATIENT)
Dept: SURGERY | Facility: CLINIC | Age: 63
End: 2023-01-09

## 2023-01-09 ENCOUNTER — HOSPITAL ENCOUNTER (INPATIENT)
Facility: HOSPITAL | Age: 63
LOS: 5 days | Discharge: HOME | DRG: 330 | End: 2023-01-14
Attending: EMERGENCY MEDICINE | Admitting: SURGERY

## 2023-01-09 ENCOUNTER — ANESTHESIA (OUTPATIENT)
Dept: SURGERY | Facility: HOSPITAL | Age: 63
DRG: 330 | End: 2023-01-09

## 2023-01-09 ENCOUNTER — ANESTHESIA EVENT (OUTPATIENT)
Dept: SURGERY | Facility: HOSPITAL | Age: 63
DRG: 330 | End: 2023-01-09

## 2023-01-09 DIAGNOSIS — K56.2 VOLVULUS: ICD-10-CM

## 2023-01-09 DIAGNOSIS — E80.6 HYPERBILIRUBINEMIA: ICD-10-CM

## 2023-01-09 DIAGNOSIS — I10 ESSENTIAL HYPERTENSION: ICD-10-CM

## 2023-01-09 DIAGNOSIS — R10.9 ABDOMINAL PAIN: ICD-10-CM

## 2023-01-09 DIAGNOSIS — K21.9 GASTROESOPHAGEAL REFLUX DISEASE, ESOPHAGITIS PRESENCE NOT SPECIFIED: ICD-10-CM

## 2023-01-09 DIAGNOSIS — K56.609 SBO (SMALL BOWEL OBSTRUCTION): Primary | ICD-10-CM

## 2023-01-09 LAB
ALBUMIN SERPL BCP-MCNC: 4.4 G/DL (ref 3.5–5.2)
ALP SERPL-CCNC: 73 U/L (ref 55–135)
ALT SERPL W/O P-5'-P-CCNC: 46 U/L (ref 10–44)
ANION GAP SERPL CALC-SCNC: 11 MMOL/L (ref 8–16)
AST SERPL-CCNC: 28 U/L (ref 10–40)
BACTERIA #/AREA URNS AUTO: ABNORMAL /HPF
BASOPHILS # BLD AUTO: 0.04 K/UL (ref 0–0.2)
BASOPHILS NFR BLD: 0.4 % (ref 0–1.9)
BILIRUB SERPL-MCNC: 3.2 MG/DL (ref 0.1–1)
BILIRUB UR QL STRIP: NEGATIVE
BUN SERPL-MCNC: 16 MG/DL (ref 6–20)
CALCIUM SERPL-MCNC: 10.2 MG/DL (ref 8.7–10.5)
CHLORIDE SERPL-SCNC: 99 MMOL/L (ref 95–110)
CLARITY UR REFRACT.AUTO: CLEAR
CO2 SERPL-SCNC: 27 MMOL/L (ref 23–29)
COLOR UR AUTO: YELLOW
CREAT SERPL-MCNC: 1 MG/DL (ref 0.5–1.4)
DIFFERENTIAL METHOD: ABNORMAL
EOSINOPHIL # BLD AUTO: 0.1 K/UL (ref 0–0.5)
EOSINOPHIL NFR BLD: 0.6 % (ref 0–8)
ERYTHROCYTE [DISTWIDTH] IN BLOOD BY AUTOMATED COUNT: 13.6 % (ref 11.5–14.5)
EST. GFR  (NO RACE VARIABLE): >60 ML/MIN/1.73 M^2
GLUCOSE SERPL-MCNC: 92 MG/DL (ref 70–110)
GLUCOSE UR QL STRIP: NEGATIVE
HCT VFR BLD AUTO: 52.8 % (ref 40–54)
HGB BLD-MCNC: 16.9 G/DL (ref 14–18)
HGB UR QL STRIP: ABNORMAL
HYALINE CASTS UR QL AUTO: 24 /LPF
IMM GRANULOCYTES # BLD AUTO: 0.03 K/UL (ref 0–0.04)
IMM GRANULOCYTES NFR BLD AUTO: 0.3 % (ref 0–0.5)
KETONES UR QL STRIP: ABNORMAL
LACTATE SERPL-SCNC: 1.7 MMOL/L (ref 0.5–2.2)
LEUKOCYTE ESTERASE UR QL STRIP: NEGATIVE
LIPASE SERPL-CCNC: 29 U/L (ref 4–60)
LYMPHOCYTES # BLD AUTO: 1.9 K/UL (ref 1–4.8)
LYMPHOCYTES NFR BLD: 17.2 % (ref 18–48)
MCH RBC QN AUTO: 29.2 PG (ref 27–31)
MCHC RBC AUTO-ENTMCNC: 32 G/DL (ref 32–36)
MCV RBC AUTO: 91 FL (ref 82–98)
MICROSCOPIC COMMENT: ABNORMAL
MONOCYTES # BLD AUTO: 1.1 K/UL (ref 0.3–1)
MONOCYTES NFR BLD: 10.5 % (ref 4–15)
NEUTROPHILS # BLD AUTO: 7.7 K/UL (ref 1.8–7.7)
NEUTROPHILS NFR BLD: 71 % (ref 38–73)
NITRITE UR QL STRIP: NEGATIVE
NRBC BLD-RTO: 0 /100 WBC
PH UR STRIP: 6 [PH] (ref 5–8)
PLATELET # BLD AUTO: 286 K/UL (ref 150–450)
PMV BLD AUTO: 10.6 FL (ref 9.2–12.9)
POTASSIUM SERPL-SCNC: 4.2 MMOL/L (ref 3.5–5.1)
PROT SERPL-MCNC: 7.8 G/DL (ref 6–8.4)
PROT UR QL STRIP: ABNORMAL
RBC # BLD AUTO: 5.79 M/UL (ref 4.6–6.2)
RBC #/AREA URNS AUTO: 7 /HPF (ref 0–4)
SODIUM SERPL-SCNC: 137 MMOL/L (ref 136–145)
SP GR UR STRIP: 1.01 (ref 1–1.03)
SQUAMOUS #/AREA URNS AUTO: 0 /HPF
URN SPEC COLLECT METH UR: ABNORMAL
WBC # BLD AUTO: 10.85 K/UL (ref 3.9–12.7)
WBC #/AREA URNS AUTO: 21 /HPF (ref 0–5)

## 2023-01-09 PROCEDURE — 36000708 HC OR TIME LEV III 1ST 15 MIN: Performed by: SURGERY

## 2023-01-09 PROCEDURE — 99285 EMERGENCY DEPT VISIT HI MDM: CPT | Mod: ,,, | Performed by: PHYSICIAN ASSISTANT

## 2023-01-09 PROCEDURE — 36000706: Performed by: SURGERY

## 2023-01-09 PROCEDURE — 63600175 PHARM REV CODE 636 W HCPCS: Performed by: NURSE ANESTHETIST, CERTIFIED REGISTERED

## 2023-01-09 PROCEDURE — 25000003 PHARM REV CODE 250: Performed by: STUDENT IN AN ORGANIZED HEALTH CARE EDUCATION/TRAINING PROGRAM

## 2023-01-09 PROCEDURE — 37000009 HC ANESTHESIA EA ADD 15 MINS: Performed by: SURGERY

## 2023-01-09 PROCEDURE — 83690 ASSAY OF LIPASE: CPT | Performed by: PHYSICIAN ASSISTANT

## 2023-01-09 PROCEDURE — 63600175 PHARM REV CODE 636 W HCPCS

## 2023-01-09 PROCEDURE — 93010 ELECTROCARDIOGRAM REPORT: CPT | Mod: ,,, | Performed by: INTERNAL MEDICINE

## 2023-01-09 PROCEDURE — 25000003 PHARM REV CODE 250: Performed by: NURSE ANESTHETIST, CERTIFIED REGISTERED

## 2023-01-09 PROCEDURE — 25000003 PHARM REV CODE 250: Performed by: PHYSICIAN ASSISTANT

## 2023-01-09 PROCEDURE — 25500020 PHARM REV CODE 255: Performed by: EMERGENCY MEDICINE

## 2023-01-09 PROCEDURE — 93005 ELECTROCARDIOGRAM TRACING: CPT

## 2023-01-09 PROCEDURE — 63600175 PHARM REV CODE 636 W HCPCS: Performed by: ANESTHESIOLOGY

## 2023-01-09 PROCEDURE — 93010 EKG 12-LEAD: ICD-10-PCS | Mod: ,,, | Performed by: INTERNAL MEDICINE

## 2023-01-09 PROCEDURE — 36000707: Performed by: SURGERY

## 2023-01-09 PROCEDURE — 44160 REMOVAL OF COLON: CPT | Mod: ,,, | Performed by: SURGERY

## 2023-01-09 PROCEDURE — 88307 TISSUE EXAM BY PATHOLOGIST: CPT | Performed by: PATHOLOGY

## 2023-01-09 PROCEDURE — D9220A PRA ANESTHESIA: Mod: ,,, | Performed by: ANESTHESIOLOGY

## 2023-01-09 PROCEDURE — 85025 COMPLETE CBC W/AUTO DIFF WBC: CPT | Performed by: PHYSICIAN ASSISTANT

## 2023-01-09 PROCEDURE — 99285 EMERGENCY DEPT VISIT HI MDM: CPT | Mod: 25

## 2023-01-09 PROCEDURE — 88307 PR  SURG PATH,LEVEL V: ICD-10-PCS | Mod: 26,,, | Performed by: PATHOLOGY

## 2023-01-09 PROCEDURE — 96374 THER/PROPH/DIAG INJ IV PUSH: CPT

## 2023-01-09 PROCEDURE — 63600175 PHARM REV CODE 636 W HCPCS: Performed by: STUDENT IN AN ORGANIZED HEALTH CARE EDUCATION/TRAINING PROGRAM

## 2023-01-09 PROCEDURE — 87086 URINE CULTURE/COLONY COUNT: CPT | Performed by: PHYSICIAN ASSISTANT

## 2023-01-09 PROCEDURE — D9220A PRA ANESTHESIA: ICD-10-PCS | Mod: ,,, | Performed by: ANESTHESIOLOGY

## 2023-01-09 PROCEDURE — 11000001 HC ACUTE MED/SURG PRIVATE ROOM

## 2023-01-09 PROCEDURE — 71000015 HC POSTOP RECOV 1ST HR: Performed by: SURGERY

## 2023-01-09 PROCEDURE — 96375 TX/PRO/DX INJ NEW DRUG ADDON: CPT

## 2023-01-09 PROCEDURE — 63600175 PHARM REV CODE 636 W HCPCS: Performed by: PHYSICIAN ASSISTANT

## 2023-01-09 PROCEDURE — 71000033 HC RECOVERY, INTIAL HOUR: Performed by: SURGERY

## 2023-01-09 PROCEDURE — 80053 COMPREHEN METABOLIC PANEL: CPT | Performed by: PHYSICIAN ASSISTANT

## 2023-01-09 PROCEDURE — 99285 PR EMERGENCY DEPT VISIT,LEVEL V: ICD-10-PCS | Mod: ,,, | Performed by: PHYSICIAN ASSISTANT

## 2023-01-09 PROCEDURE — 27201423 OPTIME MED/SURG SUP & DEVICES STERILE SUPPLY: Performed by: SURGERY

## 2023-01-09 PROCEDURE — 44160 PR REMVL COLON & TERM ILEUM W/ILEOCOLOSTOMY: ICD-10-PCS | Mod: ,,, | Performed by: SURGERY

## 2023-01-09 PROCEDURE — 37000008 HC ANESTHESIA 1ST 15 MINUTES: Performed by: SURGERY

## 2023-01-09 PROCEDURE — 88307 TISSUE EXAM BY PATHOLOGIST: CPT | Mod: 26,,, | Performed by: PATHOLOGY

## 2023-01-09 PROCEDURE — 81001 URINALYSIS AUTO W/SCOPE: CPT | Performed by: PHYSICIAN ASSISTANT

## 2023-01-09 PROCEDURE — 36000709 HC OR TIME LEV III EA ADD 15 MIN: Performed by: SURGERY

## 2023-01-09 PROCEDURE — 96361 HYDRATE IV INFUSION ADD-ON: CPT

## 2023-01-09 PROCEDURE — 83605 ASSAY OF LACTIC ACID: CPT | Performed by: PHYSICIAN ASSISTANT

## 2023-01-09 RX ORDER — ONDANSETRON 2 MG/ML
4 INJECTION INTRAMUSCULAR; INTRAVENOUS EVERY 6 HOURS PRN
Status: DISCONTINUED | OUTPATIENT
Start: 2023-01-09 | End: 2023-01-14 | Stop reason: HOSPADM

## 2023-01-09 RX ORDER — ONDANSETRON 2 MG/ML
4 INJECTION INTRAMUSCULAR; INTRAVENOUS EVERY 6 HOURS PRN
Status: DISCONTINUED | OUTPATIENT
Start: 2023-01-09 | End: 2023-01-09

## 2023-01-09 RX ORDER — DROPERIDOL 2.5 MG/ML
0.62 INJECTION, SOLUTION INTRAMUSCULAR; INTRAVENOUS ONCE AS NEEDED
Status: DISCONTINUED | OUTPATIENT
Start: 2023-01-09 | End: 2023-01-09 | Stop reason: HOSPADM

## 2023-01-09 RX ORDER — ROCURONIUM BROMIDE 10 MG/ML
INJECTION, SOLUTION INTRAVENOUS
Status: DISCONTINUED | OUTPATIENT
Start: 2023-01-09 | End: 2023-01-09

## 2023-01-09 RX ORDER — HYDROMORPHONE HYDROCHLORIDE 1 MG/ML
0.2 INJECTION, SOLUTION INTRAMUSCULAR; INTRAVENOUS; SUBCUTANEOUS EVERY 5 MIN PRN
Status: DISCONTINUED | OUTPATIENT
Start: 2023-01-09 | End: 2023-01-09 | Stop reason: HOSPADM

## 2023-01-09 RX ORDER — HYDROMORPHONE HYDROCHLORIDE 1 MG/ML
INJECTION, SOLUTION INTRAMUSCULAR; INTRAVENOUS; SUBCUTANEOUS
Status: COMPLETED
Start: 2023-01-09 | End: 2023-01-09

## 2023-01-09 RX ORDER — ONDANSETRON 2 MG/ML
4 INJECTION INTRAMUSCULAR; INTRAVENOUS EVERY 8 HOURS PRN
Status: DISCONTINUED | OUTPATIENT
Start: 2023-01-09 | End: 2023-01-09

## 2023-01-09 RX ORDER — ACETAMINOPHEN 10 MG/ML
1000 INJECTION, SOLUTION INTRAVENOUS EVERY 8 HOURS
Status: COMPLETED | OUTPATIENT
Start: 2023-01-09 | End: 2023-01-10

## 2023-01-09 RX ORDER — LIDOCAINE HYDROCHLORIDE 20 MG/ML
INJECTION INTRAVENOUS
Status: DISCONTINUED | OUTPATIENT
Start: 2023-01-09 | End: 2023-01-09

## 2023-01-09 RX ORDER — SODIUM CHLORIDE, SODIUM LACTATE, POTASSIUM CHLORIDE, CALCIUM CHLORIDE 600; 310; 30; 20 MG/100ML; MG/100ML; MG/100ML; MG/100ML
INJECTION, SOLUTION INTRAVENOUS CONTINUOUS
Status: DISCONTINUED | OUTPATIENT
Start: 2023-01-09 | End: 2023-01-12

## 2023-01-09 RX ORDER — NALOXONE HCL 0.4 MG/ML
0.02 VIAL (ML) INJECTION
Status: DISCONTINUED | OUTPATIENT
Start: 2023-01-09 | End: 2023-01-14 | Stop reason: HOSPADM

## 2023-01-09 RX ORDER — ONDANSETRON 2 MG/ML
4 INJECTION INTRAMUSCULAR; INTRAVENOUS
Status: COMPLETED | OUTPATIENT
Start: 2023-01-09 | End: 2023-01-09

## 2023-01-09 RX ORDER — FAMOTIDINE 10 MG/ML
20 INJECTION INTRAVENOUS 2 TIMES DAILY
Status: DISCONTINUED | OUTPATIENT
Start: 2023-01-09 | End: 2023-01-14 | Stop reason: HOSPADM

## 2023-01-09 RX ORDER — PROCHLORPERAZINE EDISYLATE 5 MG/ML
5 INJECTION INTRAMUSCULAR; INTRAVENOUS EVERY 6 HOURS PRN
Status: DISCONTINUED | OUTPATIENT
Start: 2023-01-09 | End: 2023-01-14 | Stop reason: HOSPADM

## 2023-01-09 RX ORDER — ONDANSETRON 2 MG/ML
4 INJECTION INTRAMUSCULAR; INTRAVENOUS ONCE AS NEEDED
Status: COMPLETED | OUTPATIENT
Start: 2023-01-09 | End: 2023-01-09

## 2023-01-09 RX ORDER — MORPHINE SULFATE 4 MG/ML
4 INJECTION, SOLUTION INTRAMUSCULAR; INTRAVENOUS
Status: COMPLETED | OUTPATIENT
Start: 2023-01-09 | End: 2023-01-09

## 2023-01-09 RX ORDER — PROPOFOL 10 MG/ML
VIAL (ML) INTRAVENOUS
Status: DISCONTINUED | OUTPATIENT
Start: 2023-01-09 | End: 2023-01-09

## 2023-01-09 RX ORDER — PHENYLEPHRINE HYDROCHLORIDE 10 MG/ML
INJECTION INTRAVENOUS
Status: DISCONTINUED | OUTPATIENT
Start: 2023-01-09 | End: 2023-01-09

## 2023-01-09 RX ORDER — HYDROMORPHONE HYDROCHLORIDE 1 MG/ML
0.5 INJECTION, SOLUTION INTRAMUSCULAR; INTRAVENOUS; SUBCUTANEOUS
Status: DISCONTINUED | OUTPATIENT
Start: 2023-01-09 | End: 2023-01-14 | Stop reason: HOSPADM

## 2023-01-09 RX ORDER — MORPHINE SULFATE 4 MG/ML
4 INJECTION, SOLUTION INTRAMUSCULAR; INTRAVENOUS EVERY 4 HOURS PRN
Status: DISCONTINUED | OUTPATIENT
Start: 2023-01-09 | End: 2023-01-11

## 2023-01-09 RX ORDER — FENTANYL CITRATE 50 UG/ML
INJECTION, SOLUTION INTRAMUSCULAR; INTRAVENOUS
Status: DISCONTINUED | OUTPATIENT
Start: 2023-01-09 | End: 2023-01-09

## 2023-01-09 RX ORDER — NEOSTIGMINE METHYLSULFATE 0.5 MG/ML
INJECTION, SOLUTION INTRAVENOUS
Status: DISCONTINUED | OUTPATIENT
Start: 2023-01-09 | End: 2023-01-09

## 2023-01-09 RX ORDER — TALC
6 POWDER (GRAM) TOPICAL NIGHTLY PRN
Status: DISCONTINUED | OUTPATIENT
Start: 2023-01-09 | End: 2023-01-14 | Stop reason: HOSPADM

## 2023-01-09 RX ORDER — CEFAZOLIN SODIUM 1 G/3ML
INJECTION, POWDER, FOR SOLUTION INTRAMUSCULAR; INTRAVENOUS
Status: DISCONTINUED | OUTPATIENT
Start: 2023-01-09 | End: 2023-01-09

## 2023-01-09 RX ORDER — MORPHINE SULFATE 2 MG/ML
2 INJECTION, SOLUTION INTRAMUSCULAR; INTRAVENOUS EVERY 4 HOURS PRN
Status: DISCONTINUED | OUTPATIENT
Start: 2023-01-10 | End: 2023-01-11

## 2023-01-09 RX ORDER — ENOXAPARIN SODIUM 100 MG/ML
40 INJECTION SUBCUTANEOUS EVERY 24 HOURS
Status: DISCONTINUED | OUTPATIENT
Start: 2023-01-09 | End: 2023-01-14 | Stop reason: HOSPADM

## 2023-01-09 RX ORDER — MORPHINE SULFATE 2 MG/ML
2 INJECTION, SOLUTION INTRAMUSCULAR; INTRAVENOUS EVERY 4 HOURS PRN
Status: DISCONTINUED | OUTPATIENT
Start: 2023-01-09 | End: 2023-01-09

## 2023-01-09 RX ORDER — SUCCINYLCHOLINE CHLORIDE 20 MG/ML
INJECTION INTRAMUSCULAR; INTRAVENOUS
Status: DISCONTINUED | OUTPATIENT
Start: 2023-01-09 | End: 2023-01-09

## 2023-01-09 RX ORDER — DEXAMETHASONE SODIUM PHOSPHATE 4 MG/ML
INJECTION, SOLUTION INTRA-ARTICULAR; INTRALESIONAL; INTRAMUSCULAR; INTRAVENOUS; SOFT TISSUE
Status: DISCONTINUED | OUTPATIENT
Start: 2023-01-09 | End: 2023-01-09

## 2023-01-09 RX ORDER — KETAMINE HCL IN 0.9 % NACL 50 MG/5 ML
SYRINGE (ML) INTRAVENOUS
Status: DISCONTINUED | OUTPATIENT
Start: 2023-01-09 | End: 2023-01-09

## 2023-01-09 RX ORDER — SODIUM CHLORIDE 0.9 % (FLUSH) 0.9 %
10 SYRINGE (ML) INJECTION
Status: DISCONTINUED | OUTPATIENT
Start: 2023-01-09 | End: 2023-01-14 | Stop reason: HOSPADM

## 2023-01-09 RX ORDER — ONDANSETRON 2 MG/ML
INJECTION INTRAMUSCULAR; INTRAVENOUS
Status: DISCONTINUED | OUTPATIENT
Start: 2023-01-09 | End: 2023-01-09

## 2023-01-09 RX ADMIN — DEXAMETHASONE SODIUM PHOSPHATE 8 MG: 4 INJECTION, SOLUTION INTRAMUSCULAR; INTRAVENOUS at 06:01

## 2023-01-09 RX ADMIN — HYDROMORPHONE HYDROCHLORIDE 0.2 MG: 1 INJECTION, SOLUTION INTRAMUSCULAR; INTRAVENOUS; SUBCUTANEOUS at 09:01

## 2023-01-09 RX ADMIN — FENTANYL CITRATE 25 MCG: 50 INJECTION, SOLUTION INTRAMUSCULAR; INTRAVENOUS at 09:01

## 2023-01-09 RX ADMIN — Medication 25 MG: at 09:01

## 2023-01-09 RX ADMIN — MORPHINE SULFATE 4 MG: 4 INJECTION INTRAVENOUS at 02:01

## 2023-01-09 RX ADMIN — SODIUM CHLORIDE 1000 ML: 9 INJECTION, SOLUTION INTRAVENOUS at 02:01

## 2023-01-09 RX ADMIN — CEFAZOLIN 2 G: 330 INJECTION, POWDER, FOR SOLUTION INTRAMUSCULAR; INTRAVENOUS at 06:01

## 2023-01-09 RX ADMIN — AMPICILLIN AND SULBACTAM 3 G: 1; 2 INJECTION, POWDER, FOR SOLUTION INTRAMUSCULAR; INTRAVENOUS at 11:01

## 2023-01-09 RX ADMIN — ACETAMINOPHEN 1000 MG: 10 INJECTION INTRAVENOUS at 09:01

## 2023-01-09 RX ADMIN — FENTANYL CITRATE 50 MCG: 50 INJECTION, SOLUTION INTRAMUSCULAR; INTRAVENOUS at 09:01

## 2023-01-09 RX ADMIN — FENTANYL CITRATE 100 MCG: 50 INJECTION, SOLUTION INTRAMUSCULAR; INTRAVENOUS at 06:01

## 2023-01-09 RX ADMIN — ROCURONIUM BROMIDE 40 MG: 10 INJECTION INTRAVENOUS at 06:01

## 2023-01-09 RX ADMIN — IOHEXOL 100 ML: 350 INJECTION, SOLUTION INTRAVENOUS at 03:01

## 2023-01-09 RX ADMIN — ONDANSETRON 4 MG: 2 INJECTION INTRAMUSCULAR; INTRAVENOUS at 09:01

## 2023-01-09 RX ADMIN — NEOSTIGMINE METHYLSULFATE 5 MG: 0.5 INJECTION, SOLUTION INTRAVENOUS at 08:01

## 2023-01-09 RX ADMIN — FAMOTIDINE 20 MG: 10 INJECTION, SOLUTION INTRAVENOUS at 09:01

## 2023-01-09 RX ADMIN — ROCURONIUM BROMIDE 20 MG: 10 INJECTION INTRAVENOUS at 07:01

## 2023-01-09 RX ADMIN — PROPOFOL 200 MG: 10 INJECTION, EMULSION INTRAVENOUS at 06:01

## 2023-01-09 RX ADMIN — MORPHINE SULFATE 4 MG: 4 INJECTION INTRAVENOUS at 11:01

## 2023-01-09 RX ADMIN — PHENYLEPHRINE HYDROCHLORIDE 100 MCG: 10 INJECTION INTRAVENOUS at 06:01

## 2023-01-09 RX ADMIN — SUCCINYLCHOLINE CHLORIDE 200 MG: 20 INJECTION, SOLUTION INTRAMUSCULAR; INTRAVENOUS at 06:01

## 2023-01-09 RX ADMIN — Medication 25 MG: at 06:01

## 2023-01-09 RX ADMIN — ONDANSETRON 4 MG: 2 INJECTION INTRAMUSCULAR; INTRAVENOUS at 02:01

## 2023-01-09 RX ADMIN — GLYCOPYRROLATE 0.8 MG: 0.2 INJECTION INTRAMUSCULAR; INTRAVENOUS at 08:01

## 2023-01-09 RX ADMIN — ONDANSETRON 4 MG: 2 INJECTION INTRAMUSCULAR; INTRAVENOUS at 08:01

## 2023-01-09 RX ADMIN — LIDOCAINE HYDROCHLORIDE 100 MG: 20 INJECTION INTRAVENOUS at 06:01

## 2023-01-09 RX ADMIN — ROCURONIUM BROMIDE 10 MG: 10 INJECTION INTRAVENOUS at 06:01

## 2023-01-09 RX ADMIN — PROCHLORPERAZINE EDISYLATE 5 MG: 5 INJECTION INTRAMUSCULAR; INTRAVENOUS at 09:01

## 2023-01-09 RX ADMIN — SODIUM CHLORIDE, SODIUM LACTATE, POTASSIUM CHLORIDE, AND CALCIUM CHLORIDE: 600; 310; 30; 20 INJECTION, SOLUTION INTRAVENOUS at 09:01

## 2023-01-09 RX ADMIN — METHOCARBAMOL 500 MG: 100 INJECTION, SOLUTION INTRAMUSCULAR; INTRAVENOUS at 09:01

## 2023-01-09 RX ADMIN — SODIUM CHLORIDE, SODIUM GLUCONATE, SODIUM ACETATE, POTASSIUM CHLORIDE, MAGNESIUM CHLORIDE, SODIUM PHOSPHATE, DIBASIC, AND POTASSIUM PHOSPHATE: .53; .5; .37; .037; .03; .012; .00082 INJECTION, SOLUTION INTRAVENOUS at 06:01

## 2023-01-09 NOTE — ED TRIAGE NOTES
Jenni Prescott Jr., a 62 y.o. male presents to the ED w/ complaint of abdominal pain. Pt recently seen here for SBO. Reports since d/c, was intially feeling better but not back to baseline. Since Saturday, reports increasing umbilical abdominal pain. Observed guarding, tender to palpation. Associated with loss of appetite, no PO intake since Saturday evening, intermittent. Additionally, had several episodes of emesis at the over the weekend, but this symptoms has since subsided. Last BM today, soft. Hx of abdominal surgery; pt does not have a colon. Denies fevers.     Triage note:  Chief Complaint   Patient presents with    Abdominal Pain     Seen here last weekend with obstruction,     Review of patient's allergies indicates:   Allergen Reactions    Codeine      Itching with Codeine , Oxycodone     Oxycodone      Itching      Past Medical History:   Diagnosis Date    GERD (gastroesophageal reflux disease)     Taking Protonix    Hyperlipidemia     Hypertension     Neuropathic pain of right thigh     History

## 2023-01-09 NOTE — SUBJECTIVE & OBJECTIVE
Current Facility-Administered Medications on File Prior to Encounter   Medication    0.9%  NaCl infusion    dextrose 5 % and 0.45 % NaCl with KCl 20 mEq infusion    dextrose 50% injection 12.5 g    dextrose 50% injection 12.5 g    dextrose 50% injection 25 g    enoxaparin injection 40 mg    gabapentin capsule 300 mg    gabapentin capsule 300 mg    glucagon (human recombinant) injection 1 mg    glucose chewable tablet 16 g    glucose chewable tablet 16 g    glucose chewable tablet 24 g    ibuprofen (CALDOLOR) 800mg/250mL D5W (READY TO MIX SYSTEM)    naloxone 0.4 mg/mL injection 0.02 mg    ondansetron injection 4 mg    promethazine (PHENERGAN) 6.25 mg in dextrose 5 % 50 mL IVPB    sodium chloride 0.9% flush 3 mL     Current Outpatient Medications on File Prior to Encounter   Medication Sig    atorvastatin (LIPITOR) 20 MG tablet Take 1 tablet (20 mg total) by mouth once daily.    lisinopriL-hydrochlorothiazide (PRINZIDE,ZESTORETIC) 20-12.5 mg per tablet Take 1 tablet by mouth once daily.    pantoprazole (PROTONIX) 40 MG tablet TAKE 1 TABLET(40 MG) BY MOUTH EVERY DAY       Review of patient's allergies indicates:   Allergen Reactions    Codeine      Itching with Codeine , Oxycodone     Oxycodone      Itching        Past Medical History:   Diagnosis Date    GERD (gastroesophageal reflux disease)     Taking Protonix    Hyperlipidemia     Hypertension     Neuropathic pain of right thigh     History     Past Surgical History:   Procedure Laterality Date    COLONOSCOPY N/A 1/8/2018    Procedure: COLONOSCOPY;  Surgeon: Carson Yañez Jr., MD;  Location: [facility];  Service: Endoscopy;  Laterality: N/A;    COLONOSCOPY N/A 2/19/2018    Procedure: COLONOSCOPY/Polypectomy;  Surgeon: Rex Blanco MD;  Location: [facility];  Service: Endoscopy;  Laterality: N/A;    COLONOSCOPY N/A 5/21/2018    Procedure: COLONOSCOPY/Miralax Split;  Surgeon: Malvin Lozano MD;  Location: [facility];  Service: Endoscopy;  Laterality:  N/A;    COLONOSCOPY N/A 6/11/2018    Procedure: COLONOSCOPY;  Surgeon: Papa Lopez MD;  Location: [facility] ENDO (4TH FLR);  Service: Endoscopy;  Laterality: N/A;  Miralax prep-ok per Dr Lopez    FLEXIBLE SIGMOIDOSCOPY N/A 3/11/2019    Procedure: SIGMOIDOSCOPY, FLEXIBLE;  Surgeon: Wilfred Rahman MD;  Location: [facility] ENDO (4TH FLR);  Service: Endoscopy;  Laterality: N/A;    INSERTION OF CATHETER Left 9/5/2018    Procedure: Insertion-Catheter;  Surgeon: Mohan Ventura MD;  Location: [facility] OR 2ND FLR;  Service: Urology;  Laterality: Left;    KNEE SURGERY Bilateral     states he has screws    KNEE SURGERY      3 reconstructive surgeries 20yrs go     LAPAROSCOPIC TOTAL COLECTOMY Left 9/5/2018    Procedure: COLECTOMY, TOTAL, LAPAROSCOPIC - lap total;  Surgeon: Papa Lopez MD;  [facility];  Service: Colon and Rectal;  Laterality: Left;  preop center  DIFFICULT INTUBATION GLIDESCOPE USED    WRIST SURGERY      2015     Family History       Problem Relation (Age of Onset)    Cancer Father    Heart disease Mother (40)    Hyperlipidemia Brother    Hypertension Brother    Lung cancer Father          Tobacco Use    Smoking status: Never    Smokeless tobacco: Never   Substance and Sexual Activity    Alcohol use: No     Comment:  quit     Drug use: Yes     Types: Marijuana     Comment: a few times a week, restarted about 2 months ago    Sexual activity: Yes     Partners: Female     Review of Systems   Constitutional:  Negative for chills and fever.   Respiratory:  Negative for cough and shortness of breath.    Cardiovascular:  Negative for chest pain.   Gastrointestinal:  Positive for abdominal pain, nausea and vomiting. Negative for constipation and diarrhea.   Genitourinary:  Negative for dysuria.   Neurological:  Negative for dizziness and light-headedness.   Objective:     Vital Signs (Most Recent):  Temp: 98.6 °F (37 °C) (01/09/23 1506)  Pulse: 64 (01/09/23 1506)  Resp: 20 (01/09/23 1506)  BP: (!)  170/65 (01/09/23 1506)  SpO2: 98 % (01/09/23 1506)   Vital Signs (24h Range):  Temp:  [98.4 °F (36.9 °C)-98.6 °F (37 °C)] 98.6 °F (37 °C)  Pulse:  [64-94] 64  Resp:  [18-20] 20  SpO2:  [98 %] 98 %  BP: (132-170)/(65-96) 170/65     Weight: 83.9 kg (185 lb)  Body mass index is 26.54 kg/m².    Physical Exam  Vitals reviewed.   Constitutional:       Appearance: Normal appearance.   Cardiovascular:      Rate and Rhythm: Normal rate.      Pulses: Normal pulses.   Pulmonary:      Effort: Pulmonary effort is normal.   Abdominal:      General: There is distension.      Palpations: Abdomen is soft.      Tenderness: There is abdominal tenderness.      Comments: Slight distension stable.   Skin:     General: Skin is warm and dry.   Neurological:      General: No focal deficit present.      Mental Status: He is alert and oriented to person, place, and time.       Significant Labs:  I have reviewed all pertinent lab results within the past 24 hours.  CBC:   Recent Labs   Lab 01/09/23  1339   WBC 10.85   RBC 5.79   HGB 16.9   HCT 52.8      MCV 91   MCH 29.2   MCHC 32.0     CMP:   Recent Labs   Lab 01/09/23  1339   GLU 92   CALCIUM 10.2   ALBUMIN 4.4   PROT 7.8      K 4.2   CO2 27   CL 99   BUN 16   CREATININE 1.0   ALKPHOS 73   ALT 46*   AST 28   BILITOT 3.2*       Significant Diagnostics:  I have reviewed all pertinent imaging results/findings within the past 24 hours.

## 2023-01-09 NOTE — ED PROVIDER NOTES
Encounter Date: 1/9/2023       History     Chief Complaint   Patient presents with    Abdominal Pain     Seen here last weekend with obstruction,     62-year-old male with past medical history of hypertension, hyperlipidemia, GERD who presents to the emergency department with chief complaint of abdominal pain. He was recently admitted to general surgery service with concern for small bowel obstruction.  He did not undergo surgical intervention. He had a gastrografin study, which revealed contrast in the rectum and was discharged home. He reports abdominal soreness since his discharge. His pain has worsened in the last 3 days. He endorses nausea and vomiting.  His last bowel movement was this morning.  He denies chest pain, shortness of breath, fever, dysuria, hematuria.  She denies any other worsening or alleviating factors.    Review of patient's allergies indicates:   Allergen Reactions    Codeine      Itching with Codeine , Oxycodone     Oxycodone      Itching      Past Medical History:   Diagnosis Date    GERD (gastroesophageal reflux disease)     Taking Protonix    Hyperlipidemia     Hypertension     Neuropathic pain of right thigh     History     Past Surgical History:   Procedure Laterality Date    ANASTOMOSIS OF ILEUM TO RECTUM  1/9/2023    Procedure: ANASTOMOSIS, ILEORECTAL;  Surgeon: Garrick Hennessy MD;  Location: [facility] OR [address] [place] [address];  Service: General;;    COLONOSCOPY N/A 1/8/2018    Procedure: COLONOSCOPY;  Surgeon: Carson Yañez Jr., MD;  Location: [facility];  Service: Endoscopy;  Laterality: N/A;    COLONOSCOPY N/A 2/19/2018    Procedure: COLONOSCOPY/Polypectomy;  Surgeon: Rex Blanco MD;  Location: [facility];  Service: Endoscopy;  Laterality: N/A;    COLONOSCOPY N/A 5/21/2018    Procedure: COLONOSCOPY/Miralax Split;  Surgeon: Malvin Lozano MD;  Location: [facility];  Service: Endoscopy;  Laterality: N/A;    COLONOSCOPY N/A 6/11/2018    Procedure: COLONOSCOPY;  Surgeon: Papa PUTNAM  MD John;  Location: [facility] ENDO (4TH FLR);  Service: Endoscopy;  Laterality: N/A;  Miralax prep-ok per Dr Lopez    FLEXIBLE SIGMOIDOSCOPY N/A 3/11/2019    Procedure: SIGMOIDOSCOPY, FLEXIBLE;  Surgeon: Wilfred Rahman MD;  Location: [facility] ENDO (4TH FLR);  Service: Endoscopy;  Laterality: N/A;    INSERTION OF CATHETER Left 9/5/2018    Procedure: Insertion-Catheter;  Surgeon: Mohan Ventura MD;  Location: [facility] OR [address] [place] [address];  Service: Urology;  Laterality: Left;    KNEE SURGERY Bilateral     states he has screws    KNEE SURGERY      3 reconstructive surgeries 20yrs go     LAPAROSCOPIC TOTAL COLECTOMY Left 9/5/2018    Procedure: COLECTOMY, TOTAL, LAPAROSCOPIC - lap total;  Surgeon: Papa Lopez MD;  Location: [facility] OR [address] [place] [address];  Service: Colon and Rectal;  Laterality: Left;  preop center  DIFFICULT INTUBATION GLIDESCOPE USED    LAPAROTOMY, EXPLORATORY N/A 1/9/2023    Procedure: LAPAROTOMY, EXPLORATORY, revision of ileorectal anastomosis;  Surgeon: Garrick Hennessy MD;  Location: [address];  Service: General;  Laterality: N/A;    WRIST SURGERY      2015     Family History   Problem Relation Age of Onset    Heart disease Mother 40    Cancer Father     Lung cancer Father     Hyperlipidemia Brother     Hypertension Brother     Anesthesia problems Neg Hx      Social History     Tobacco Use    Smoking status: Never    Smokeless tobacco: Never   Substance Use Topics    Alcohol use: No     Comment:  quit     Drug use: Yes     Types: Marijuana     Comment: a few times a week, restarted about 2 months ago     Review of Systems   Constitutional:  Negative for fever.   HENT:  Negative for sore throat.    Respiratory:  Negative for shortness of breath.    Cardiovascular:  Negative for chest pain.   Gastrointestinal:  Positive for abdominal pain, nausea and vomiting.   Genitourinary:  Negative for dysuria.   Musculoskeletal:  Negative for back pain.   Skin:  Negative for rash.   Neurological:  Negative for weakness.    Hematological:  Does not bruise/bleed easily.     Physical Exam     Initial Vitals [01/09/23 1133]   BP Pulse Resp Temp SpO2   (!) 132/96 94 18 98.4 °F (36.9 °C) 98 %      MAP       --         Physical Exam    Nursing note and vitals reviewed.  Constitutional: He appears well-developed and well-nourished. He is not diaphoretic. No distress.   HENT:   Head: Normocephalic and atraumatic.   Mouth/Throat: Oropharynx is clear and moist.   Eyes: EOM are normal. Pupils are equal, round, and reactive to light.   Neck: Neck supple.   Normal range of motion.  Cardiovascular:  Normal rate, regular rhythm, normal heart sounds and intact distal pulses.     Exam reveals no gallop and no friction rub.       No murmur heard.  Pulmonary/Chest: Breath sounds normal. He has no wheezes. He has no rhonchi. He has no rales.   Abdominal: Abdomen is soft. Bowel sounds are normal. There is generalized abdominal tenderness.   Musculoskeletal:         General: Normal range of motion.      Cervical back: Normal range of motion and neck supple.     Neurological: He is alert and oriented to person, place, and time. He has normal strength. GCS score is 15. GCS eye subscore is 4. GCS verbal subscore is 5. GCS motor subscore is 6.   Skin: Skin is warm and dry. Capillary refill takes less than 2 seconds.   Psychiatric: He has a normal mood and affect. His behavior is normal. Judgment and thought content normal.       ED Course   Procedures  Labs Reviewed   CBC W/ AUTO DIFFERENTIAL - Abnormal; Notable for the following components:       Result Value    Mono # 1.1 (*)     Lymph % 17.2 (*)     All other components within normal limits   COMPREHENSIVE METABOLIC PANEL - Abnormal; Notable for the following components:    Total Bilirubin 3.2 (*)     ALT 46 (*)     All other components within normal limits   URINALYSIS, REFLEX TO URINE CULTURE - Abnormal; Notable for the following components:    Protein, UA 1+ (*)     Ketones, UA Trace (*)     Occult  Blood UA Trace (*)     All other components within normal limits    Narrative:     Specimen Source->Urine   URINALYSIS MICROSCOPIC - Abnormal; Notable for the following components:    RBC, UA 7 (*)     WBC, UA 21 (*)     Hyaline Casts, UA 24 (*)     All other components within normal limits    Narrative:     Specimen Source->Urine   CULTURE, URINE   LIPASE   LACTIC ACID, PLASMA        ECG Results              EKG 12-lead (Final result)  Result time 01/09/23 14:24:33      Final result by Interface, Lab In [facility] (01/09/23 14:24:33)                   Narrative:    Test Reason : R10.9,    Vent. Rate : 067 BPM     Atrial Rate : 067 BPM     P-R Int : 156 ms          QRS Dur : 088 ms      QT Int : 382 ms       P-R-T Axes : 067 -08 038 degrees     QTc Int : 403 ms    Normal sinus rhythm with sinus arrhythmia  Baseline Artifact  Normal ECG  When compared with ECG of 30-DEC-2022 07:38,  QT has shortened  Confirmed by Trevor Valdez MD ([id number]) on 1/9/2023 2:24:22 PM    Referred By: [id number]   SELF           Confirmed By:Trevor Valdez MD                                  Imaging Results               CT Abdomen Pelvis With Contrast (Final result)  Result time 01/09/23 16:18:43      Final result by Andre Dockery MD (01/09/23 16:18:43)                   Impression:      This report was flagged in Epic as abnormal.    1. Interval mesenteric swirling about the ileal rectal anastomosis, with positioning of the distal small bowel within the right quadrant, previously within the left.  Findings are concerning for adhesion resulting in tethering of the bowel in the region/volvulus.  This results in small-bowel obstruction, developing high-grade obstruction not excluded given caliber change.  Correlation is advised.  No pneumatosis or free air at this time noting reactive abdominopelvic ascites.  2. Urinary bladder wall thickening, may be on the basis of chronic outlet obstruction in this patient with prostatomegaly however  correlation with urinalysis is recommended as cystitis can present in this fashion.  3. Findings suggesting hepatic steatosis, correlation with LFTs recommended.  4. Please see above for several additional findings.      Electronically signed by: Andre Dockery MD  Date:    01/09/2023  Time:    16:18               Narrative:    EXAMINATION:  CT ABDOMEN PELVIS WITH CONTRAST    CLINICAL HISTORY:  Abdominal pain, acute, nonlocalized;    TECHNIQUE:  Low dose axial images, sagittal and coronal reformations were obtained from the lung bases to the pubic symphysis following the IV administration of 100 mL of Omnipaque 350 .  Oral contrast was not given.    COMPARISON:  12/30/2022    FINDINGS:  Images of the lower thorax are remarkable for minimal dependent atelectasis.    In the liver is hypoattenuating, may reflect steatosis, correlation with LFTs recommended.  There is a low attenuating lesion within the anterior aspect of the spleen measuring 2.8 cm, attenuation of which suggests possible cyst.  The stomach is decompressed without wall thickening.  The pancreas, gallbladder and adrenal glands are grossly unremarkable.  There is no biliary dilation.  The portal vein, splenic vein, SMV, celiac axis and SMA all are patent.  No significant abdominal lymphadenopathy.  There is fluid anterior to the liver.    The kidneys enhance symmetrically without hydronephrosis.  There is right nonobstructive nephrolithiasis, no left nephrolithiasis.  There is left perinephric fat stranding.  There are subcentimeter low attenuating lesions arising from the interpolar region of the left kidney, too small for characterization.  The bilateral ureters are unremarkable without calculi seen.  The urinary bladder is relatively decompressed noting there is some residual wall thickening.  The prostate is enlarged.    There is surgical change of total colectomy and ileal rectal anastomosis..  In comparison to examination 12/30/2022, there is been  development of mesenteric swirling about the anastomotic site, now with the anastomosis and distal ileal segments within the right lower quadrant, translocated from the left.  There is diffuse dilation of the small bowel from this location upstream.  There are segments of decompressed proximal to mid small bowel.  No findings to suggest pneumatosis or free air.  There are a few scattered shotty periaortic, pericaval, and mesenteric lymph nodes.  There is atherosclerotic calcification of the aorta and its branches.  No focal organized pelvic fluid collection.  There is a small amount of fluid in the deep pelvis.    There is osteopenia.  There are remote fractures of the right inferior and superior pubic rami.  There are degenerative changes of the spine and sacroiliac joints.  There is remote appearing injury of left rib 6.  No significant inguinal lymphadenopathy.                                       Medications   morphine injection 4 mg (4 mg Intravenous Given 1/10/23 0444)   prochlorperazine injection Soln 5 mg (5 mg Intravenous Given 1/9/23 2134)   sodium chloride 0.9% flush 10 mL (has no administration in time range)   melatonin tablet 6 mg (has no administration in time range)   naloxone 0.4 mg/mL injection 0.02 mg (has no administration in time range)   ondansetron injection 4 mg (has no administration in time range)   lactated ringers infusion ( Intravenous New Bag 1/9/23 2153)   ampicillin-sulbactam (UNASYN) 3 g in sodium chloride 0.9 % 100 mL IVPB (MB+) (0 g Intravenous Stopped 1/10/23 0544)   acetaminophen 1,000 mg/100 mL (10 mg/mL) injection 1,000 mg (0 mg Intravenous Stopped 1/10/23 0608)   methocarbamoL (ROBAXIN) 500 mg in dextrose 5 % 100 mL IVPB (0 mg Intravenous Stopped 1/10/23 0644)   HYDROmorphone injection 0.5 mg (0.5 mg Intravenous Given 1/10/23 0808)   enoxaparin injection 40 mg (40 mg Subcutaneous Not Given 1/9/23 2144)   famotidine (PF) injection 20 mg (20 mg Intravenous Given 1/10/23 0808)    morphine injection 2 mg (has no administration in time range)   potassium chloride 10 mEq in 100 mL IVPB (10 mEq Intravenous New Bag 1/10/23 0912)   sodium chloride 0.9% bolus 1,000 mL 1,000 mL (0 mLs Intravenous Stopped 1/9/23 1500)   morphine injection 4 mg (4 mg Intravenous Given 1/9/23 1405)   ondansetron injection 4 mg (4 mg Intravenous Given 1/9/23 1402)   iohexoL (OMNIPAQUE 350) injection 100 mL (100 mLs Intravenous Given 1/9/23 1545)   ondansetron injection 4 mg (4 mg Intravenous Given 1/9/23 2125)     Medical Decision Making:   History:   Old Medical Records: I decided to obtain old medical records.  Initial Assessment:   Emergent evaluation of a 62 y.o. male presenting to the emergency department complaining of abdominal pain, nausea, vomiting. Patient is afebrile, hemodynamically stable, and non toxic appearing.   Will order labs, imaging, analgesia.   Differential Diagnosis:   Ddx includes but is not limited to sbo, colitis, enteritis, gastritis.   ED Management:  No severe hematologic derangements.  Kidney function baseline/   Total bilirubin 3.2.  Decreased from prior.  Lipase and lactate within normal limits.  UA pending.  CT abdomen pelvis pending.  Due to shift change, will sign out to oncoming ED provider who will continue to monitor until final disposition is reached.  All questions answered.  The patient's history, physical exam, and plan of care was discussed with and agreed upon with my supervising physician.           Attending Attestation:     Physician Attestation Statement for NP/PA:   I discussed this assessment and plan of this patient with the NP/PA, but I did not personally examine the patient. The face to face encounter was performed by the NP/PA.            ED Course as of 01/10/23 0957   Mon Jan 09, 2023   1429 WBC: 10.85 [JM]   1429 Hemoglobin: 16.9 [JM]   1429 Hematocrit: 52.8 [JM]   1429 Platelets: 286 [JM]   1454 Creatinine: 1.0 [JM]   1454 BILIRUBIN TOTAL(!): 3.2 [JM]   1454  Lipase: 29 [JM]   1454 Lactate, Mauri: 1.7 [JM]      ED Course User Index  [JM] Denisse Matthews PA-C                 Clinical Impression:   Final diagnoses:  [R10.9] Abdominal pain  [K56.609] SBO (small bowel obstruction) (Primary)  [K56.2] Volvulus  [E80.6] Hyperbilirubinemia        ED Disposition Condition    Admit Stable                Denisse Matthews PA-C  01/09/23 1612       Bhanu Hensley MD  01/10/23 0938

## 2023-01-09 NOTE — PROVIDER PROGRESS NOTES - EMERGENCY DEPT.
Encounter Date: 1/9/2023    ED Physician Progress Notes        Physician Note:   ED Physician Hand-off Note:    ED Course: I assumed care of patient from off-going ED primary team. Briefly, patient is a 62-year-old male with a history of colectomy due to polyposis, recent admission for SBO, presents the [facility] [place] [place] ED with abdominal pain, nausea, and vomiting.  Patient with signs of chronic hyperbilirubinemia.  No signs of leukocytosis, anemia, SAUD.    At the time of signout, the plan was pending CT scan and clinical improvement.    Medications given in the ED:    Medications  sodium chloride 0.9% bolus 1,000 mL 1,000 mL (0 mLs Intravenous Stopped 1/9/23 1500)  morphine injection 4 mg (4 mg Intravenous Given 1/9/23 1405)  ondansetron injection 4 mg (4 mg Intravenous Given 1/9/23 1402)  iohexoL (OMNIPAQUE 350) injection 100 mL (100 mLs Intravenous Given 1/9/23 1545)     ED course:  4:32 PM  Case discussed with general surgery for signs concerning of volvulus vs SBO.  They will evaluate and give recommendations.    5:08 PM  Patient will be admitted to general surgery service for further evaluation and management of suspected volvulus versus SBO.    Disposition: admit to inpatient    Impression:  SBO (small bowel obstruction)  (primary encounter diagnosis)  Abdominal pain  Volvulus  Hyperbilirubinemia    5:38 PM  Karyn Queen PA-C  Emergent Department  [facility]  Spectralink #[id number] or #[phone]

## 2023-01-09 NOTE — ASSESSMENT & PLAN NOTE
63 yo male with concern for mechanical obstruction and volvulus of the small bowel about a previous ileorectal anastomosis. Given the failure of prior conservative management and worsening imaging findings with persistent symptoms, we will proceed with exploratory laparotomy and lysis of adhesions. Indications for surgery, including risks and benefits discussed with Mr. Prescott, and he would like to proceed.    - admit to General Surgery  - Consent obtained in the ED and placed in the general surgery consent folder in preop  - Keep NPO  - OR for exploratory laparotomy and lysis of adhesions with Dr. Hennessy

## 2023-01-09 NOTE — PHARMACY MED REC
"          Admission Medication History     The home medication history was taken by Meme Garcia.    You may go to "Admission" then "Reconcile Home Medications" tabs to review and/or act upon these items.     The home medication list has been updated by the Pharmacy department.   Please read ALL comments highlighted in yellow.   Please address this information as you see fit.    Feel free to contact us if you have any questions or require assistance.          Current Outpatient Medications on File Prior to Encounter   Medication Sig    atorvastatin (LIPITOR) 20 MG tablet   Take 1 tablet (20 mg total) by mouth once daily. (Patient not taking: Reported on 1/9/2023)    lisinopriL-hydrochlorothiazide (PRINZIDE,ZESTORETIC) 20-12.5 mg per tablet   Take 1 tablet by mouth once daily. (Patient not taking: Reported on 1/9/2023)    pantoprazole (PROTONIX) 40 MG tablet TAKE 1 TABLET(40 MG) BY MOUTH EVERY DAY (Patient not taking: Reported on 1/9/2023)         Potential issues to be addressed PRIOR TO DISCHARGE  Patient requires education regarding drug therapies     Meme Garcia  EXT [id number]        .        "

## 2023-01-09 NOTE — CONSULTS
[facility] - Emergency Dept  General Surgery  Consult Note    Patient Name: Jenni Prescott Jr.  MRN: [id number]  Code Status: Prior  Admission Date: 1/9/2023  Hospital Length of Stay: 0 days  Attending Physician: Bhanu Hensley MD  Primary Care Provider: Radha Antoine MD    Patient information was obtained from patient and ER records.     Inpatient consult to General surgery  Consult performed by: Javy Kern MD  Consult ordered by: Karyn Queen PA-C        Subjective:     Principal Problem: SBO (small bowel obstruction)    History of Present Illness: Mr. Prescott is a 62 yoM with PMH including total colectomy with ileorectal anastomosis for polyposis in 2018 who presented to the ED on 12/30 with 1 week of worsening bloating, abdominal pain,and nausea/vomiting. Workup in the ED revealed a CT scan significant for potential transition point of the ileum in the RLQ with ileal distension and decompression of the proximal small bowel and stomach. He was admitted and passed a GGC challenge and was discharged on 12/31.    He re presents today with continued bloating, abdominal pain, nausea, and emesis. Repeat CT scan on 1/9 demonstrated mesenteric swirling about the ileorectal anastomosis with positioning of the small bowel in the right quadrant (vs the left on 12/30). No fever, chills, shortness of breath, or chest pain.      Current Facility-Administered Medications on File Prior to Encounter   Medication    0.9%  NaCl infusion    dextrose 5 % and 0.45 % NaCl with KCl 20 mEq infusion    dextrose 50% injection 12.5 g    dextrose 50% injection 12.5 g    dextrose 50% injection 25 g    enoxaparin injection 40 mg    gabapentin capsule 300 mg    gabapentin capsule 300 mg    glucagon (human recombinant) injection 1 mg    glucose chewable tablet 16 g    glucose chewable tablet 16 g    glucose chewable tablet 24 g    ibuprofen (CALDOLOR) 800mg/250mL D5W (READY TO MIX SYSTEM)    naloxone 0.4 mg/mL  injection 0.02 mg    ondansetron injection 4 mg    promethazine (PHENERGAN) 6.25 mg in dextrose 5 % 50 mL IVPB    sodium chloride 0.9% flush 3 mL     Current Outpatient Medications on File Prior to Encounter   Medication Sig    atorvastatin (LIPITOR) 20 MG tablet Take 1 tablet (20 mg total) by mouth once daily.    lisinopriL-hydrochlorothiazide (PRINZIDE,ZESTORETIC) 20-12.5 mg per tablet Take 1 tablet by mouth once daily.    pantoprazole (PROTONIX) 40 MG tablet TAKE 1 TABLET(40 MG) BY MOUTH EVERY DAY       Review of patient's allergies indicates:   Allergen Reactions    Codeine      Itching with Codeine , Oxycodone     Oxycodone      Itching        Past Medical History:   Diagnosis Date    GERD (gastroesophageal reflux disease)     Taking Protonix    Hyperlipidemia     Hypertension     Neuropathic pain of right thigh     History     Past Surgical History:   Procedure Laterality Date    COLONOSCOPY N/A 1/8/2018    Procedure: COLONOSCOPY;  Surgeon: Carson Yañez Jr., MD;  Location: [facility];  Service: Endoscopy;  Laterality: N/A;    COLONOSCOPY N/A 2/19/2018    Procedure: COLONOSCOPY/Polypectomy;  Surgeon: Rex Blanco MD;  Location: [facility];  Service: Endoscopy;  Laterality: N/A;    COLONOSCOPY N/A 5/21/2018    Procedure: COLONOSCOPY/Miralax Split;  Surgeon: Malvin Lozano MD;  Location: [facility];  Service: Endoscopy;  Laterality: N/A;    COLONOSCOPY N/A 6/11/2018    Procedure: COLONOSCOPY;  Surgeon: Papa Lopez MD;  Location: [facility] (4TH FLR);  Service: Endoscopy;  Laterality: N/A;  Miralax prep-ok per Dr Lopez    FLEXIBLE SIGMOIDOSCOPY N/A 3/11/2019    Procedure: SIGMOIDOSCOPY, FLEXIBLE;  Surgeon: Wilfred Rahman MD;  Location: [facility] (4TH FLR);  Service: Endoscopy;  Laterality: N/A;    INSERTION OF CATHETER Left 9/5/2018    Procedure: Insertion-Catheter;  Surgeon: Mohan Ventura MD;  Location: [facility] 2ND FLR;  Service: Urology;  Laterality: Left;    KNEE  SURGERY Bilateral     states he has screws    KNEE SURGERY      3 reconstructive surgeries 20yrs go     LAPAROSCOPIC TOTAL COLECTOMY Left 9/5/2018    Procedure: COLECTOMY, TOTAL, LAPAROSCOPIC - lap total;  Surgeon: Papa Lopez MD;  Location: [facility] OR [address] [place] [address];  Service: Colon and Rectal;  Laterality: Left;  preop center  DIFFICULT INTUBATION GLIDESCOPE USED    WRIST SURGERY      2015     Family History       Problem Relation (Age of Onset)    Cancer Father    Heart disease Mother (40)    Hyperlipidemia Brother    Hypertension Brother    Lung cancer Father          Tobacco Use    Smoking status: Never    Smokeless tobacco: Never   Substance and Sexual Activity    Alcohol use: No     Comment:  quit     Drug use: Yes     Types: Marijuana     Comment: a few times a week, restarted about 2 months ago    Sexual activity: Yes     Partners: Female     Review of Systems   Constitutional:  Negative for chills and fever.   Respiratory:  Negative for cough and shortness of breath.    Cardiovascular:  Negative for chest pain.   Gastrointestinal:  Positive for abdominal pain, nausea and vomiting. Negative for constipation and diarrhea.   Genitourinary:  Negative for dysuria.   Neurological:  Negative for dizziness and light-headedness.   Objective:     Vital Signs (Most Recent):  Temp: 98.6 °F (37 °C) (01/09/23 1506)  Pulse: 64 (01/09/23 1506)  Resp: 20 (01/09/23 1506)  BP: (!) 170/65 (01/09/23 1506)  SpO2: 98 % (01/09/23 1506)   Vital Signs (24h Range):  Temp:  [98.4 °F (36.9 °C)-98.6 °F (37 °C)] 98.6 °F (37 °C)  Pulse:  [64-94] 64  Resp:  [18-20] 20  SpO2:  [98 %] 98 %  BP: (132-170)/(65-96) 170/65     Weight: 83.9 kg (185 lb)  Body mass index is 26.54 kg/m².    Physical Exam  Vitals reviewed.   Constitutional:       Appearance: Normal appearance.   Cardiovascular:      Rate and Rhythm: Normal rate.      Pulses: Normal pulses.   Pulmonary:      Effort: Pulmonary effort is normal.   Abdominal:      General: There is  distension.      Palpations: Abdomen is soft.      Tenderness: There is abdominal tenderness.      Comments: Slight distension stable.   Skin:     General: Skin is warm and dry.   Neurological:      General: No focal deficit present.      Mental Status: He is alert and oriented to person, place, and time.       Significant Labs:  I have reviewed all pertinent lab results within the past 24 hours.  CBC:   Recent Labs   Lab 01/09/23  1339   WBC 10.85   RBC 5.79   HGB 16.9   HCT 52.8      MCV 91   MCH 29.2   MCHC 32.0     CMP:   Recent Labs   Lab 01/09/23  1339   GLU 92   CALCIUM 10.2   ALBUMIN 4.4   PROT 7.8      K 4.2   CO2 27   CL 99   BUN 16   CREATININE 1.0   ALKPHOS 73   ALT 46*   AST 28   BILITOT 3.2*       Significant Diagnostics:  I have reviewed all pertinent imaging results/findings within the past 24 hours.      Assessment/Plan:     * SBO (small bowel obstruction)  61 yo male with concern for mechanical obstruction and volvulus of the small bowel about a previous ileorectal anastomosis. Given the failure of prior conservative management and worsening imaging findings with persistent symptoms, we will proceed with exploratory laparotomy and lysis of adhesions. Indications for surgery, including risks and benefits discussed with Mr. Prescott, and he would like to proceed.    - admit to General Surgery  - Consent obtained in the ED and placed in the general surgery consent folder in preop  - Keep NPO  - OR for exploratory laparotomy and lysis of adhesions with Dr. Hennessy      VTE Risk Mitigation (From admission, onward)    None          Thank you for your consult. I will follow-up with patient. Please contact us if you have any additional questions.    Javy Kern MD  General Surgery  [facility] - Emergency Dept

## 2023-01-10 PROBLEM — E87.6 HYPOKALEMIA: Status: ACTIVE | Noted: 2023-01-10

## 2023-01-10 LAB
ALBUMIN SERPL BCP-MCNC: 3.3 G/DL (ref 3.5–5.2)
ALP SERPL-CCNC: 52 U/L (ref 55–135)
ALT SERPL W/O P-5'-P-CCNC: 38 U/L (ref 10–44)
ANION GAP SERPL CALC-SCNC: 9 MMOL/L (ref 8–16)
AST SERPL-CCNC: 20 U/L (ref 10–40)
BASOPHILS # BLD AUTO: 0.02 K/UL (ref 0–0.2)
BASOPHILS NFR BLD: 0.1 % (ref 0–1.9)
BILIRUB SERPL-MCNC: 3.4 MG/DL (ref 0.1–1)
BUN SERPL-MCNC: 12 MG/DL (ref 6–20)
CALCIUM SERPL-MCNC: 8.4 MG/DL (ref 8.7–10.5)
CHLORIDE SERPL-SCNC: 104 MMOL/L (ref 95–110)
CO2 SERPL-SCNC: 26 MMOL/L (ref 23–29)
CREAT SERPL-MCNC: 0.7 MG/DL (ref 0.5–1.4)
CRP SERPL-MCNC: 29 MG/L (ref 0–8.2)
DIFFERENTIAL METHOD: ABNORMAL
EOSINOPHIL # BLD AUTO: 0 K/UL (ref 0–0.5)
EOSINOPHIL NFR BLD: 0 % (ref 0–8)
ERYTHROCYTE [DISTWIDTH] IN BLOOD BY AUTOMATED COUNT: 13.4 % (ref 11.5–14.5)
EST. GFR  (NO RACE VARIABLE): >60 ML/MIN/1.73 M^2
GLUCOSE SERPL-MCNC: 116 MG/DL (ref 70–110)
HCT VFR BLD AUTO: 47.2 % (ref 40–54)
HGB BLD-MCNC: 14.8 G/DL (ref 14–18)
IMM GRANULOCYTES # BLD AUTO: 0.05 K/UL (ref 0–0.04)
IMM GRANULOCYTES NFR BLD AUTO: 0.3 % (ref 0–0.5)
LYMPHOCYTES # BLD AUTO: 0.7 K/UL (ref 1–4.8)
LYMPHOCYTES NFR BLD: 5 % (ref 18–48)
MAGNESIUM SERPL-MCNC: 2 MG/DL (ref 1.6–2.6)
MCH RBC QN AUTO: 28.3 PG (ref 27–31)
MCHC RBC AUTO-ENTMCNC: 31.4 G/DL (ref 32–36)
MCV RBC AUTO: 90 FL (ref 82–98)
MONOCYTES # BLD AUTO: 0.9 K/UL (ref 0.3–1)
MONOCYTES NFR BLD: 6.3 % (ref 4–15)
NEUTROPHILS # BLD AUTO: 12.8 K/UL (ref 1.8–7.7)
NEUTROPHILS NFR BLD: 88.3 % (ref 38–73)
NRBC BLD-RTO: 0 /100 WBC
PHOSPHATE SERPL-MCNC: 3.7 MG/DL (ref 2.7–4.5)
PLATELET # BLD AUTO: 242 K/UL (ref 150–450)
PMV BLD AUTO: 10.5 FL (ref 9.2–12.9)
POTASSIUM SERPL-SCNC: 3.3 MMOL/L (ref 3.5–5.1)
PROT SERPL-MCNC: 5.7 G/DL (ref 6–8.4)
RBC # BLD AUTO: 5.23 M/UL (ref 4.6–6.2)
SODIUM SERPL-SCNC: 139 MMOL/L (ref 136–145)
WBC # BLD AUTO: 14.49 K/UL (ref 3.9–12.7)

## 2023-01-10 PROCEDURE — 63600175 PHARM REV CODE 636 W HCPCS: Performed by: STUDENT IN AN ORGANIZED HEALTH CARE EDUCATION/TRAINING PROGRAM

## 2023-01-10 PROCEDURE — 25000003 PHARM REV CODE 250: Performed by: STUDENT IN AN ORGANIZED HEALTH CARE EDUCATION/TRAINING PROGRAM

## 2023-01-10 PROCEDURE — 11000001 HC ACUTE MED/SURG PRIVATE ROOM

## 2023-01-10 PROCEDURE — 63600175 PHARM REV CODE 636 W HCPCS

## 2023-01-10 PROCEDURE — 83735 ASSAY OF MAGNESIUM: CPT | Performed by: STUDENT IN AN ORGANIZED HEALTH CARE EDUCATION/TRAINING PROGRAM

## 2023-01-10 PROCEDURE — 36415 COLL VENOUS BLD VENIPUNCTURE: CPT | Performed by: STUDENT IN AN ORGANIZED HEALTH CARE EDUCATION/TRAINING PROGRAM

## 2023-01-10 PROCEDURE — 80053 COMPREHEN METABOLIC PANEL: CPT | Performed by: STUDENT IN AN ORGANIZED HEALTH CARE EDUCATION/TRAINING PROGRAM

## 2023-01-10 PROCEDURE — 84100 ASSAY OF PHOSPHORUS: CPT | Performed by: STUDENT IN AN ORGANIZED HEALTH CARE EDUCATION/TRAINING PROGRAM

## 2023-01-10 PROCEDURE — 86140 C-REACTIVE PROTEIN: CPT | Performed by: STUDENT IN AN ORGANIZED HEALTH CARE EDUCATION/TRAINING PROGRAM

## 2023-01-10 PROCEDURE — 94761 N-INVAS EAR/PLS OXIMETRY MLT: CPT

## 2023-01-10 PROCEDURE — 85025 COMPLETE CBC W/AUTO DIFF WBC: CPT | Performed by: STUDENT IN AN ORGANIZED HEALTH CARE EDUCATION/TRAINING PROGRAM

## 2023-01-10 RX ORDER — POTASSIUM CHLORIDE 7.45 MG/ML
10 INJECTION INTRAVENOUS
Status: COMPLETED | OUTPATIENT
Start: 2023-01-10 | End: 2023-01-10

## 2023-01-10 RX ADMIN — POTASSIUM CHLORIDE 10 MEQ: 7.46 INJECTION, SOLUTION INTRAVENOUS at 02:01

## 2023-01-10 RX ADMIN — AMPICILLIN AND SULBACTAM 3 G: 1; 2 INJECTION, POWDER, FOR SOLUTION INTRAMUSCULAR; INTRAVENOUS at 10:01

## 2023-01-10 RX ADMIN — METHOCARBAMOL 500 MG: 100 INJECTION, SOLUTION INTRAMUSCULAR; INTRAVENOUS at 06:01

## 2023-01-10 RX ADMIN — FAMOTIDINE 20 MG: 10 INJECTION, SOLUTION INTRAVENOUS at 08:01

## 2023-01-10 RX ADMIN — AMPICILLIN AND SULBACTAM 3 G: 1; 2 INJECTION, POWDER, FOR SOLUTION INTRAMUSCULAR; INTRAVENOUS at 04:01

## 2023-01-10 RX ADMIN — POTASSIUM CHLORIDE 10 MEQ: 7.46 INJECTION, SOLUTION INTRAVENOUS at 11:01

## 2023-01-10 RX ADMIN — POTASSIUM CHLORIDE 10 MEQ: 7.46 INJECTION, SOLUTION INTRAVENOUS at 03:01

## 2023-01-10 RX ADMIN — HYDROMORPHONE HYDROCHLORIDE 0.5 MG: 1 INJECTION, SOLUTION INTRAMUSCULAR; INTRAVENOUS; SUBCUTANEOUS at 08:01

## 2023-01-10 RX ADMIN — HYDROMORPHONE HYDROCHLORIDE 0.5 MG: 1 INJECTION, SOLUTION INTRAMUSCULAR; INTRAVENOUS; SUBCUTANEOUS at 06:01

## 2023-01-10 RX ADMIN — FAMOTIDINE 20 MG: 10 INJECTION, SOLUTION INTRAVENOUS at 09:01

## 2023-01-10 RX ADMIN — METHOCARBAMOL 500 MG: 100 INJECTION, SOLUTION INTRAMUSCULAR; INTRAVENOUS at 09:01

## 2023-01-10 RX ADMIN — METHOCARBAMOL 500 MG: 100 INJECTION, SOLUTION INTRAMUSCULAR; INTRAVENOUS at 03:01

## 2023-01-10 RX ADMIN — POTASSIUM CHLORIDE 10 MEQ: 7.46 INJECTION, SOLUTION INTRAVENOUS at 01:01

## 2023-01-10 RX ADMIN — HYDROMORPHONE HYDROCHLORIDE 0.5 MG: 1 INJECTION, SOLUTION INTRAMUSCULAR; INTRAVENOUS; SUBCUTANEOUS at 01:01

## 2023-01-10 RX ADMIN — POTASSIUM CHLORIDE 10 MEQ: 7.46 INJECTION, SOLUTION INTRAVENOUS at 10:01

## 2023-01-10 RX ADMIN — MORPHINE SULFATE 2 MG: 2 INJECTION, SOLUTION INTRAMUSCULAR; INTRAVENOUS at 03:01

## 2023-01-10 RX ADMIN — HYDROMORPHONE HYDROCHLORIDE 0.5 MG: 1 INJECTION, SOLUTION INTRAMUSCULAR; INTRAVENOUS; SUBCUTANEOUS at 11:01

## 2023-01-10 RX ADMIN — ENOXAPARIN SODIUM 40 MG: 40 INJECTION SUBCUTANEOUS at 05:01

## 2023-01-10 RX ADMIN — ACETAMINOPHEN 1000 MG: 10 INJECTION INTRAVENOUS at 02:01

## 2023-01-10 RX ADMIN — ACETAMINOPHEN 1000 MG: 10 INJECTION INTRAVENOUS at 05:01

## 2023-01-10 RX ADMIN — MORPHINE SULFATE 2 MG: 2 INJECTION, SOLUTION INTRAMUSCULAR; INTRAVENOUS at 09:01

## 2023-01-10 RX ADMIN — POTASSIUM CHLORIDE 10 MEQ: 7.46 INJECTION, SOLUTION INTRAVENOUS at 09:01

## 2023-01-10 RX ADMIN — MORPHINE SULFATE 4 MG: 4 INJECTION INTRAVENOUS at 04:01

## 2023-01-10 NOTE — SUBJECTIVE & OBJECTIVE
Interval History: S/p Ex Lap with revision of ileorectal anastomosis. Patient had NAEON. AF. VSS. Pain is well controlled. No gas or BM today.     Medications:  Continuous Infusions:   lactated ringers 125 mL/hr at 01/09/23 2153     Scheduled Meds:   acetaminophen  1,000 mg Intravenous Q8H    ampicillin-sulbactim (UNASYN) IVPB  3 g Intravenous Q6H    enoxaparin  40 mg Subcutaneous Daily    famotidine (PF)  20 mg Intravenous BID    methocarbamol (ROBAXIN) IVPB  500 mg Intravenous Q8H     PRN Meds:HYDROmorphone, melatonin, morphine, morphine, naloxone, ondansetron, prochlorperazine, sodium chloride 0.9%     Review of patient's allergies indicates:   Allergen Reactions    Codeine      Itching with Codeine , Oxycodone     Oxycodone      Itching      Objective:     Vital Signs (Most Recent):  Temp: 98.1 °F (36.7 °C) (01/10/23 0512)  Pulse: 82 (01/10/23 0512)  Resp: 16 (01/10/23 0512)  BP: (!) 149/94 (01/10/23 0512)  SpO2: 95 % (01/10/23 0512)   Vital Signs (24h Range):  Temp:  [97.9 °F (36.6 °C)-98.6 °F (37 °C)] 98.1 °F (36.7 °C)  Pulse:  [] 82  Resp:  [15-20] 16  SpO2:  [94 %-99 %] 95 %  BP: (132-178)/() 149/94     Weight: 83.9 kg (185 lb)  Body mass index is 26.54 kg/m².    Intake/Output - Last 3 Shifts         01/08 0700  01/09 0659 01/09 0700  01/10 0659 01/10 0700  01/11 0659    I.V. (mL/kg)  1500 (17.9)     IV Piggyback  2299     Total Intake(mL/kg)  3799 (45.3)     Urine (mL/kg/hr)  2150     Blood  30     Total Output  2180     Net  +1619                    Physical Exam  Vitals reviewed.   Constitutional:       Appearance: Normal appearance.   Cardiovascular:      Rate and Rhythm: Normal rate.      Pulses: Normal pulses.   Pulmonary:      Effort: Pulmonary effort is normal.   Abdominal:      General: There is distension.      Palpations: Abdomen is soft.      Tenderness: There is abdominal tenderness.      Comments: Slight distension stable. Dresings CDI. Sutures intact.    Skin:     General: Skin is  warm and dry.   Neurological:      General: No focal deficit present.      Mental Status: He is alert and oriented to person, place, and time.       Significant Labs:  I have reviewed all pertinent lab results within the past 24 hours.    Significant Diagnostics:  I have reviewed all pertinent imaging results/findings within the past 24 hours.

## 2023-01-10 NOTE — PLAN OF CARE
[facility]  Initial Discharge Assessment       Primary Care Provider: Radha Antoine MD    Admission Diagnosis: Volvulus [K56.2]  Hyperbilirubinemia [E80.6]  SBO (small bowel obstruction) [K56.609]  Abdominal pain [R10.9]    Admission Date: 1/9/2023  Expected Discharge Date: 1/13/2023    Discharge Barriers Identified: None    Payor: /     Extended Emergency Contact Information  Primary Emergency Contact: Liset Prescott  Address: [address] [place] [address] [facility]  Home Phone: [phone]  Work Phone: [phone]  Mobile Phone: [phone]  Relation: Spouse  Secondary Emergency Contact: Jenni Prescott III   [place]  Mobile Phone: [phone]  Relation: Son    Discharge Plan A: Home with family         [id number] [address] [facility] [address] [facility] [address] [facility] [address]  Phone: [phone] Fax: [phone]      Initial Assessment (most recent)       Adult Discharge Assessment - 01/10/23 1028          Discharge Assessment    Assessment Type Discharge Planning Assessment     Confirmed/corrected address, phone number and insurance Yes     Confirmed Demographics Correct on Facesheet     Source of Information patient     Communicated KELLE with patient/caregiver Yes     People in Home spouse     Do you expect to return to your current living situation? Yes     Do you have help at home or someone to help you manage your care at home? Yes     Prior to hospitilization cognitive status: Alert/Oriented     Current cognitive status: Alert/Oriented     Home Accessibility wheelchair accessible     Home Layout Able to live on 1st floor     Equipment Currently Used at Home walker, rolling     Readmission within 30 days? Yes     Patient currently being followed by outpatient case management? No     Do you currently have service(s) that help you manage your care at home? No     Do you have  prescription coverage? Yes     How do you get to doctors appointments? car, drives self;family or friend will provide     Are you on dialysis? No     Do you take coumadin? No     Discharge Plan A Home with family     DME Needed Upon Discharge  none     Discharge Plan discussed with: Patient     Discharge Barriers Identified None                         SW completed discharge planning assessment with the patient at bedside. SW verified demographic information listed on the pt.'s Face sheet. Pt reports living in a single story home with his spouse, whom he plans to help manage his care upon discharge. Pt reports owning a RW, have not used in 4 years. Pt and pt's wife reports the ability to drive prior to this admit. Pt reports no current medical insurance, states he believes his wife completed a Medicaid Application. SW to return after 11 am, pt will receive update regarding insurance from his wife. SW to follow.    12: 08 PM  SW returned to speak with patient. [facility] completed application for Medicaid with pt at bedside, reported by pt's spouse (Liset).    Francisca Carbajal LMSW  Case Management   [facility]   Ext. [phone]

## 2023-01-10 NOTE — ASSESSMENT & PLAN NOTE
61 yo male with concern for mechanical obstruction and volvulus of the small bowel about a previous ileorectal anastomosis. Given the failure of prior conservative management and worsening imaging findings with persistent symptoms, we will proceed with exploratory laparotomy and lysis of adhesions. Indications for surgery, including risks and benefits discussed with  Genie, and he would like to proceed. Patient yesterday had exlap with revison of iliorectal anastomosis (1-9-23).     - Pain multimodal regimen  - MIVF   - DVT prophylaxis Lovenox   - Isaac   - PT/OT/ambulate   - IS  - Nausea PRN  - Labs daily  - Continue NG tube, will check output mid day today. Possibly discontinue and start clears

## 2023-01-10 NOTE — NURSING TRANSFER
Nursing Transfer Note      1/9/2023     Reason patient is being transferred: meets criteria    Transfer To: [facility]    Transfer via stretcher    Transfer with IV fluids    Transported by PCT    Medicines sent: IV fluids    Any special needs or follow-up needed: none    Chart send with patient: Yes    Notified: spouse    Patient reassessed at: 2200

## 2023-01-10 NOTE — ANESTHESIA PROCEDURE NOTES
Intubation    Date/Time: 1/9/2023 6:21 PM  Performed by: Jose Mendez CRNA  Authorized by: Papa Hopson MD     Intubation:     Induction:  Rapid sequence induction    Intubated:  Postinduction    Mask Ventilation:  Not attempted    Attempts:  1    Attempted By:  CRNA    Method of Intubation:  Video laryngoscopy    Blade:  De [place] 3    Laryngeal View Grade: Grade I - full view of cords      Difficult Airway Encountered?: No      Complications:  None    Airway Device:  Oral endotracheal tube    Airway Device Size:  7.5    Style/Cuff Inflation:  Cuffed (inflated to minimal occlusive pressure)    Tube secured:  22    Secured at:  The lips    Placement Verified By:  Capnometry and Fiber optic visualization    Complicating Factors:  None    Findings Post-Intubation:  BS equal bilateral and atraumatic/condition of teeth unchanged

## 2023-01-10 NOTE — PROGRESS NOTES
[facility] - [facility]  General Surgery  Progress Note    Subjective:     History of Present Illness:  Mr. Prescott is a 62 yoM with PMH including total colectomy with ileorectal anastomosis for polyposis in 2018 who presented to the ED on 12/30 with 1 week of worsening bloating, abdominal pain,and nausea/vomiting. Workup in the ED revealed a CT scan significant for potential transition point of the ileum in the RLQ with ileal distension and decompression of the proximal small bowel and stomach. He was admitted and passed a GGC challenge and was discharged on 12/31.    He re presents today with continued bloating, abdominal pain, nausea, and emesis. Repeat CT scan on 1/9 demonstrated mesenteric swirling about the ileorectal anastomosis with positioning of the small bowel in the right quadrant (vs the left on 12/30). No fever, chills, shortness of breath, or chest pain.      Post-Op Info:  Procedure(s) (LRB):  LAPAROTOMY, EXPLORATORY, revision of ileorectal anastomosis (N/A)   1 Day Post-Op     Interval History: S/p Ex Lap with revision of ileorectal anastomosis. Patient had NAEON. AF. VSS. Pain is well controlled. No gas or BM today.     Medications:  Continuous Infusions:   lactated ringers 125 mL/hr at 01/09/23 2153     Scheduled Meds:   acetaminophen  1,000 mg Intravenous Q8H    ampicillin-sulbactim (UNASYN) IVPB  3 g Intravenous Q6H    enoxaparin  40 mg Subcutaneous Daily    famotidine (PF)  20 mg Intravenous BID    methocarbamol (ROBAXIN) IVPB  500 mg Intravenous Q8H     PRN Meds:HYDROmorphone, melatonin, morphine, morphine, naloxone, ondansetron, prochlorperazine, sodium chloride 0.9%     Review of patient's allergies indicates:   Allergen Reactions    Codeine      Itching with Codeine , Oxycodone     Oxycodone      Itching      Objective:     Vital Signs (Most Recent):  Temp: 98.1 °F (36.7 °C) (01/10/23 0512)  Pulse: 82 (01/10/23 0512)  Resp: 16 (01/10/23 0512)  BP: (!) 149/94 (01/10/23 0512)  SpO2: 95 %  (01/10/23 0512)   Vital Signs (24h Range):  Temp:  [97.9 °F (36.6 °C)-98.6 °F (37 °C)] 98.1 °F (36.7 °C)  Pulse:  [] 82  Resp:  [15-20] 16  SpO2:  [94 %-99 %] 95 %  BP: (132-178)/() 149/94     Weight: 83.9 kg (185 lb)  Body mass index is 26.54 kg/m².    Intake/Output - Last 3 Shifts         01/08 0700  01/09 0659 01/09 0700  01/10 0659 01/10 0700  01/11 0659    I.V. (mL/kg)  1500 (17.9)     IV Piggyback  2299     Total Intake(mL/kg)  3799 (45.3)     Urine (mL/kg/hr)  2150     Blood  30     Total Output  2180     Net  +1619                    Physical Exam  Vitals reviewed.   Constitutional:       Appearance: Normal appearance.   Cardiovascular:      Rate and Rhythm: Normal rate.      Pulses: Normal pulses.   Pulmonary:      Effort: Pulmonary effort is normal.   Abdominal:      General: There is distension.      Palpations: Abdomen is soft.      Tenderness: There is abdominal tenderness.      Comments: Slight distension stable. Dresings CDI. Sutures intact.    Skin:     General: Skin is warm and dry.   Neurological:      General: No focal deficit present.      Mental Status: He is alert and oriented to person, place, and time.       Significant Labs:  I have reviewed all pertinent lab results within the past 24 hours.    Significant Diagnostics:  I have reviewed all pertinent imaging results/findings within the past 24 hours.    Assessment/Plan:     * SBO (small bowel obstruction)  63 yo male with concern for mechanical obstruction and volvulus of the small bowel about a previous ileorectal anastomosis. Given the failure of prior conservative management and worsening imaging findings with persistent symptoms, we will proceed with exploratory laparotomy and lysis of adhesions. Indications for surgery, including risks and benefits discussed with Mr. Prescott, and he would like to proceed. Patient yesterday had exlap with revison of iliorectal anastomosis (1-9-23).     - Pain multimodal regimen  - MIVF   - DVT  prophylaxis Lovenox   - Isaac   - PT/OT/ambulate   - IS  - Nausea PRN  - Labs daily  - Continue NG tube, will check output mid day today. Possibly discontinue and start clears        Emigdio Mcguire DPM  General Surgery  [facility] - Surgery

## 2023-01-10 NOTE — ANESTHESIA PREPROCEDURE EVALUATION
[facility]  Anesthesia Pre-Operative Evaluation         Patient Name: Jenni Prescott Jr.  YOB: 1960  MRN: [id number]    SUBJECTIVE:     Pre-operative evaluation for Procedure(s) (LRB):  LAPAROTOMY, EXPLORATORY (N/A)     01/09/2023    Mr. Prescott is a 62 yoM with PMH including total colectomy with ileorectal anastomosis for polyposis in 2018 who presented to the ED on 12/30 with 1 week of worsening bloating, abdominal pain,and nausea/vomiting. Workup in the ED revealed a CT scan significant for potential transition point of the ileum in the RLQ with ileal distension and decompression of the proximal small bowel and stomach. He was admitted and passed a GGC challenge and was discharged on 12/31.     He re presents today with continued bloating, abdominal pain, nausea, and emesis. Repeat CT scan on 1/9 demonstrated mesenteric swirling about the ileorectal anastomosis with positioning of the small bowel in the right quadrant (vs the left on 12/30). No fever, chills, shortness of breath, or chest pain.       Patient now presents for the above procedure(s).      LDA: None documented.       Peripheral IV - Single Lumen 01/09/23 1335 22 G Anterior;Left Antecubital (Active)   Site Assessment Clean;Dry;Intact;No redness 01/09/23 1335   Dressing Status Clean;Dry;Intact 01/09/23 1335   Dressing Intervention First dressing 01/09/23 1335   Number of days: 0        Prev airway: None documented.    Drips: None documented.       Patient Active Problem List   Diagnosis    Fall    Lumbar transverse process fracture    Fall    Multiple rib fractures    Pneumothorax, closed, traumatic    Multiple closed pelvic fractures with disruption of pelvic ring    Distal radius fracture, right    Range of motion deficit    Right wrist pain    Class 1 obesity with body mass index (BMI) of 30.0  to 30.9 in adult    Essential hypertension    History of prediabetes    Screening for colorectal cancer    Colon polyp    Special screening for malignant neoplasms, colon    Acid reflux    Serum total bilirubin elevated    Adenomatous polyp    Screening for colon cancer    SBO (small bowel obstruction)       Review of patient's allergies indicates:   Allergen Reactions    Codeine      Itching with Codeine , Oxycodone     Oxycodone      Itching        Current Outpatient Medications:    Current Facility-Administered Medications:     melatonin tablet 6 mg, 6 mg, Oral, Nightly PRN, Javy Kern MD    morphine injection 2 mg, 2 mg, Intravenous, Q4H PRN, Javy Kern MD    morphine injection 4 mg, 4 mg, Intravenous, Q4H PRN, Javy Kern MD    ondansetron injection 4 mg, 4 mg, Intravenous, Q8H PRN, Javy Kern MD    prochlorperazine injection Soln 5 mg, 5 mg, Intravenous, Q6H PRN, Javy Kern MD    sodium chloride 0.9% flush 10 mL, 10 mL, Intravenous, PRN, Javy Kern MD    Current Outpatient Medications:     atorvastatin (LIPITOR) 20 MG tablet, Take 1 tablet (20 mg total) by mouth once daily. (Patient not taking: Reported on 1/9/2023), Disp: 30 tablet, Rfl: 0    lisinopriL-hydrochlorothiazide (PRINZIDE,ZESTORETIC) 20-12.5 mg per tablet, Take 1 tablet by mouth once daily. (Patient not taking: Reported on 1/9/2023), Disp: 30 tablet, Rfl: 0    pantoprazole (PROTONIX) 40 MG tablet, TAKE 1 TABLET(40 MG) BY MOUTH EVERY DAY (Patient not taking: Reported on 1/9/2023), Disp: 30 tablet, Rfl: 0    Facility-Administered Medications Ordered in Other Encounters:     0.9%  NaCl infusion, , Intravenous, Continuous, Eliane Carrizales NP, Last Rate: 20 mL/hr at 03/11/19 0718, New Bag at 03/11/19 0718    dextrose 5 % and 0.45 % NaCl with KCl 20 mEq infusion, , Intravenous, Continuous, Patel Barney MD, Last Rate: 50 mL/hr at 09/08/18 1059, New Bag at 09/08/18  1059    dextrose 50% injection 12.5 g, 12.5 g, Intravenous, PRN, Dominique Mckeon NP    dextrose 50% injection 12.5 g, 12.5 g, Intravenous, PRN, Dominique Mckeon NP    dextrose 50% injection 25 g, 25 g, Intravenous, PRN, Dominique Mckeon NP    enoxaparin injection 40 mg, 40 mg, Subcutaneous, Q24H, Dominique Mckeon NP, 40 mg at 09/07/18 1445    gabapentin capsule 300 mg, 300 mg, Oral, On Call Procedure, Dominique Mckeon NP, 300 mg at 09/05/18 0726    gabapentin capsule 300 mg, 300 mg, Oral, TID, Dominique Mckeon NP, 300 mg at 09/08/18 1048    glucagon (human recombinant) injection 1 mg, 1 mg, Intramuscular, Continuous PRN, Dominique Mckeon NP    glucose chewable tablet 16 g, 16 g, Oral, PRN, Dominique Mckeon NP    glucose chewable tablet 16 g, 16 g, Oral, PRN, Dominique Mckeon NP    glucose chewable tablet 24 g, 24 g, Oral, PRN, Dominique Mckeon NP    ibuprofen (CALDOLOR) 800mg/250mL D5W (READY TO MIX SYSTEM), 800 mg, Intravenous, Q8H, Dominique Mckeon NP, 800 mg at 09/08/18 0645    naloxone 0.4 mg/mL injection 0.02 mg, 0.02 mg, Intravenous, PRN, Dominique Mckeon NP    ondansetron injection 4 mg, 4 mg, Intravenous, Q6H PRN, Dominique Mckeon NP, 4 mg at 09/06/18 1815    promethazine (PHENERGAN) 6.25 mg in dextrose 5 % 50 mL IVPB, 6.25 mg, Intravenous, Q6H PRN, Dominique Mckeon NP    sodium chloride 0.9% flush 3 mL, 3 mL, Intravenous, PRN, Eliane Carrizales NP    Past Surgical History:   Procedure Laterality Date    COLONOSCOPY N/A 1/8/2018    Procedure: COLONOSCOPY;  Surgeon: Carson Yañez Jr., MD;  Location: [facility];  Service: Endoscopy;  Laterality: N/A;    COLONOSCOPY N/A 2/19/2018    Procedure: COLONOSCOPY/Polypectomy;  Surgeon: Rex Blanco MD;  Location: [facility];  Service: Endoscopy;  Laterality: N/A;    COLONOSCOPY N/A 5/21/2018    Procedure: COLONOSCOPY/Miralax Split;  Surgeon: Malvin Lozano MD;  Location: [facility]  ENDO;  Service: Endoscopy;  Laterality: N/A;    COLONOSCOPY N/A 6/11/2018    Procedure: COLONOSCOPY;  Surgeon: Papa Lopez MD;  Location: [facility] ENDO (4TH FLR);  Service: Endoscopy;  Laterality: N/A;  Miralax prep-ok per Dr Lopez    FLEXIBLE SIGMOIDOSCOPY N/A 3/11/2019    Procedure: SIGMOIDOSCOPY, FLEXIBLE;  Surgeon: Wilfred Rahman MD;  Location: [facility] ENDO (4TH FLR);  Service: Endoscopy;  Laterality: N/A;    INSERTION OF CATHETER Left 9/5/2018    Procedure: Insertion-Catheter;  Surgeon: Mohan Ventura MD;  Location: [facility] OR 2ND FLR;  Service: Urology;  Laterality: Left;    KNEE SURGERY Bilateral     states he has screws    KNEE SURGERY      3 reconstructive surgeries 20yrs go     LAPAROSCOPIC TOTAL COLECTOMY Left 9/5/2018    Procedure: COLECTOMY, TOTAL, LAPAROSCOPIC - lap total;  Surgeon: Papa Lopez MD;  Location: [facility] OR 2ND FLR;  Service: Colon and Rectal;  Laterality: Left;  preop center  DIFFICULT INTUBATION GLIDESCOPE USED    WRIST SURGERY      2015       Social History     Socioeconomic History    Marital status:    Tobacco Use    Smoking status: Never    Smokeless tobacco: Never   Substance and Sexual Activity    Alcohol use: No     Comment:  quit     Drug use: Yes     Types: Marijuana     Comment: a few times a week, restarted about 2 months ago    Sexual activity: Yes     Partners: Female       OBJECTIVE:     Vital Signs Range (Last 24H):  Temp:  [36.9 °C (98.4 °F)-37 °C (98.6 °F)]   Pulse:  [60-94]   Resp:  [18-20]   BP: (132-171)/(65-98)   SpO2:  [96 %-98 %]       Significant Labs:  Lab Results   Component Value Date    WBC 10.85 01/09/2023    HGB 16.9 01/09/2023    HCT 52.8 01/09/2023     01/09/2023    CHOL 95 (L) 10/22/2018    TRIG 113 10/22/2018    HDL 40 10/22/2018    ALT 46 (H) 01/09/2023    AST 28 01/09/2023     01/09/2023    K 4.2 01/09/2023    CL 99 01/09/2023    CREATININE 1.0 01/09/2023    BUN 16 01/09/2023    CO2 27 01/09/2023    TSH 2.702  01/13/2016    PSA 0.61 09/26/2017    INR 1.0 04/21/2015    HGBA1C 5.5 08/29/2018       Diagnostic Studies: No relevant studies.    EKG:   Results for orders placed or performed during the hospital encounter of 01/09/23   EKG 12-lead    Collection Time: 01/09/23 11:57 AM    Narrative    Test Reason : R10.9,    Vent. Rate : 067 BPM     Atrial Rate : 067 BPM     P-R Int : 156 ms          QRS Dur : 088 ms      QT Int : 382 ms       P-R-T Axes : 067 -08 038 degrees     QTc Int : 403 ms    Normal sinus rhythm with sinus arrhythmia  Baseline Artifact  Normal ECG  When compared with ECG of 30-DEC-2022 07:38,  QT has shortened  Confirmed by Trevor Valdez MD ([id number]) on 1/9/2023 2:24:22 PM    Referred By: JAYRO   SELF           Confirmed By:Trevor Valdez MD       2D ECHO:  TTE:  No results found for this or any previous visit.    MICHELET:  No results found for this or any previous visit.    ASSESSMENT/PLAN:           Pre-op Assessment    I have reviewed the Patient Summary Reports.     I have reviewed the Nursing Notes.    I have reviewed the Medications.     Review of Systems  Anesthesia Hx:  History of prior surgery of interest to airway management or planning: Previous anesthesia: General colonoscopy 6/11/18 with general anesthesia.  Procedure performed at an [facility]. Denies Family Hx of Anesthesia complications.    Social:  Non-Smoker, No Alcohol Use  Illicit Drug Use: Types of drugs include Marijuana,   Hematology/Oncology:  Hematology Normal   Oncology Normal     EENT/Dental:   Reading glasses   Cardiovascular:   Hypertension, well controlled Denies MI.   Denies CABG/stent.  hyperlipidemia  Functional Capacity good / => 4 METS, works in construction, climbs ladder, stairs; hiking; denies CP, SOB    Pulmonary:   Denies Asthma.  Denies Shortness of breath.  Denies Sleep Apnea.    Renal/:  Renal/ Normal renal calculi     Hepatic/GI:   GERD (takes Protonix. Hasn't had symptoms in over a year), well controlled  Adenomatous polyps   Musculoskeletal:  Musculoskeletal Normal    Neurological:   Denies TIA. Denies CVA. Denies Seizures.  Denies Pain    Endocrine:   Denies Diabetes.    Psych:  Psychiatric Normal           Physical Exam  General: Well nourished    Airway:  Mallampati: II       Chest/Lungs:  Clear to auscultation, Normal Respiratory Rate    Heart:  Rate: Normal  Rhythm: Regular Rhythm        Anesthesia Plan  Type of Anesthesia, risks & benefits discussed:    Anesthesia Type: Gen ETT, MAC  Intra-op Monitoring Plan: Standard ASA Monitors  Post Op Pain Control Plan: multimodal analgesia  Induction:  IV  Informed Consent: Informed consent signed with the Patient and all parties understand the risks and agree with anesthesia plan.  All questions answered.   ASA Score: 2    Ready For Surgery From Anesthesia Perspective.     .

## 2023-01-10 NOTE — NURSING
Arrived on the unit per bed and transporter.  AAAX4.  R E/U.  ABD incision noted to the medial area with an island dressing dry/intact.  Tender to touch.  NG tube to the left nare connected to low intermittent suction.  Small amount of rust colored drainage noted.  Head of bed at 45 degrees.  Denies SOB/CP.  NADN upon departure.  Will continue to monitor.

## 2023-01-10 NOTE — BRIEF OP NOTE
[facility] ([facility])  Brief Operative Note    SUMMARY     Surgery Date: 1/9/2023     Surgeon(s) and Role:     * Pati Hennessy MD - Primary     * Quyen Vegas MD - Resident - Assisting        Pre-op Diagnosis:  SBO (small bowel obstruction) [K56.609]    Post-op Diagnosis:  Post-Op Diagnosis Codes:     * SBO (small bowel obstruction) [K56.609]    Procedure(s) (LRB):  LAPAROTOMY, EXPLORATORY, revision of ileorectal anastomosis (N/A)    Anesthesia: General    Operative Findings: chronic small bowel dilation. Twisted distal ileum attached to rectal stump. Large mesenteric defect, likely intermittently obstructing. Ileorectal anastomosis revised. No leak with rigid proctoscopy.     Estimated Blood Loss: 30 mL    Estimated Blood Loss has been documented.         Specimens:   Specimen (24h ago, onward)       Start     Ordered    01/09/23 2023  Specimen to Pathology, Surgery General Surgery  Once        Comments: Pre-op Diagnosis: SBO (small bowel obstruction) [K56.609]Procedure(s):LAPAROTOMY, EXPLORATORY, revision of ileorectal anastomosis Number of specimens: 1Name of specimens: 1) terminal ileum     References:    Click here for ordering Quick Tip   Question Answer Comment   Procedure Type: General Surgery    Which provider would you like to cc? PATI HENNESSY    Release to patient Immediate        01/09/23 2022                    [id number]

## 2023-01-10 NOTE — PROGRESS NOTES
Patient meets requirements for next phase of care. No acute complaints of nausea, pain, or dyspnea. Surgical site clean, dry, and intact. Family notified. Patient refused lovenox injection, educated on importance of DVT prevention. Patient stated that he is in too much pain right now.

## 2023-01-11 LAB
ALBUMIN SERPL BCP-MCNC: 2.7 G/DL (ref 3.5–5.2)
ALP SERPL-CCNC: 51 U/L (ref 55–135)
ALT SERPL W/O P-5'-P-CCNC: 23 U/L (ref 10–44)
ANION GAP SERPL CALC-SCNC: 7 MMOL/L (ref 8–16)
AST SERPL-CCNC: 13 U/L (ref 10–40)
BACTERIA UR CULT: NORMAL
BASOPHILS # BLD AUTO: 0.03 K/UL (ref 0–0.2)
BASOPHILS NFR BLD: 0.4 % (ref 0–1.9)
BILIRUB SERPL-MCNC: 4.5 MG/DL (ref 0.1–1)
BUN SERPL-MCNC: 9 MG/DL (ref 6–20)
CALCIUM SERPL-MCNC: 8.4 MG/DL (ref 8.7–10.5)
CHLORIDE SERPL-SCNC: 103 MMOL/L (ref 95–110)
CO2 SERPL-SCNC: 29 MMOL/L (ref 23–29)
CREAT SERPL-MCNC: 0.7 MG/DL (ref 0.5–1.4)
CRP SERPL-MCNC: 226.4 MG/L (ref 0–8.2)
DIFFERENTIAL METHOD: ABNORMAL
EOSINOPHIL # BLD AUTO: 0 K/UL (ref 0–0.5)
EOSINOPHIL NFR BLD: 0.5 % (ref 0–8)
ERYTHROCYTE [DISTWIDTH] IN BLOOD BY AUTOMATED COUNT: 13.4 % (ref 11.5–14.5)
EST. GFR  (NO RACE VARIABLE): >60 ML/MIN/1.73 M^2
GLUCOSE SERPL-MCNC: 99 MG/DL (ref 70–110)
HCT VFR BLD AUTO: 40.4 % (ref 40–54)
HGB BLD-MCNC: 13 G/DL (ref 14–18)
IMM GRANULOCYTES # BLD AUTO: 0.05 K/UL (ref 0–0.04)
IMM GRANULOCYTES NFR BLD AUTO: 0.6 % (ref 0–0.5)
LYMPHOCYTES # BLD AUTO: 0.9 K/UL (ref 1–4.8)
LYMPHOCYTES NFR BLD: 10.1 % (ref 18–48)
MAGNESIUM SERPL-MCNC: 1.9 MG/DL (ref 1.6–2.6)
MCH RBC QN AUTO: 29.2 PG (ref 27–31)
MCHC RBC AUTO-ENTMCNC: 32.2 G/DL (ref 32–36)
MCV RBC AUTO: 91 FL (ref 82–98)
MONOCYTES # BLD AUTO: 0.6 K/UL (ref 0.3–1)
MONOCYTES NFR BLD: 7.4 % (ref 4–15)
NEUTROPHILS # BLD AUTO: 6.9 K/UL (ref 1.8–7.7)
NEUTROPHILS NFR BLD: 81 % (ref 38–73)
NRBC BLD-RTO: 0 /100 WBC
PHOSPHATE SERPL-MCNC: 1.6 MG/DL (ref 2.7–4.5)
PLATELET # BLD AUTO: 180 K/UL (ref 150–450)
PMV BLD AUTO: 10.5 FL (ref 9.2–12.9)
POTASSIUM SERPL-SCNC: 3.5 MMOL/L (ref 3.5–5.1)
PROT SERPL-MCNC: 5.3 G/DL (ref 6–8.4)
RBC # BLD AUTO: 4.45 M/UL (ref 4.6–6.2)
SODIUM SERPL-SCNC: 139 MMOL/L (ref 136–145)
WBC # BLD AUTO: 8.55 K/UL (ref 3.9–12.7)

## 2023-01-11 PROCEDURE — 63600175 PHARM REV CODE 636 W HCPCS: Performed by: STUDENT IN AN ORGANIZED HEALTH CARE EDUCATION/TRAINING PROGRAM

## 2023-01-11 PROCEDURE — 11000001 HC ACUTE MED/SURG PRIVATE ROOM

## 2023-01-11 PROCEDURE — 25000003 PHARM REV CODE 250: Performed by: STUDENT IN AN ORGANIZED HEALTH CARE EDUCATION/TRAINING PROGRAM

## 2023-01-11 PROCEDURE — 86140 C-REACTIVE PROTEIN: CPT | Performed by: STUDENT IN AN ORGANIZED HEALTH CARE EDUCATION/TRAINING PROGRAM

## 2023-01-11 PROCEDURE — 83735 ASSAY OF MAGNESIUM: CPT | Performed by: STUDENT IN AN ORGANIZED HEALTH CARE EDUCATION/TRAINING PROGRAM

## 2023-01-11 PROCEDURE — 36415 COLL VENOUS BLD VENIPUNCTURE: CPT | Performed by: STUDENT IN AN ORGANIZED HEALTH CARE EDUCATION/TRAINING PROGRAM

## 2023-01-11 PROCEDURE — 85025 COMPLETE CBC W/AUTO DIFF WBC: CPT | Performed by: STUDENT IN AN ORGANIZED HEALTH CARE EDUCATION/TRAINING PROGRAM

## 2023-01-11 PROCEDURE — 84100 ASSAY OF PHOSPHORUS: CPT | Performed by: STUDENT IN AN ORGANIZED HEALTH CARE EDUCATION/TRAINING PROGRAM

## 2023-01-11 PROCEDURE — 80053 COMPREHEN METABOLIC PANEL: CPT | Performed by: STUDENT IN AN ORGANIZED HEALTH CARE EDUCATION/TRAINING PROGRAM

## 2023-01-11 PROCEDURE — 63600175 PHARM REV CODE 636 W HCPCS

## 2023-01-11 RX ORDER — HYDROMORPHONE HYDROCHLORIDE 2 MG/1
4 TABLET ORAL
Status: DISCONTINUED | OUTPATIENT
Start: 2023-01-11 | End: 2023-01-14 | Stop reason: HOSPADM

## 2023-01-11 RX ORDER — HYDROMORPHONE HYDROCHLORIDE 2 MG/1
2 TABLET ORAL
Status: DISCONTINUED | OUTPATIENT
Start: 2023-01-11 | End: 2023-01-14 | Stop reason: HOSPADM

## 2023-01-11 RX ORDER — HYDRALAZINE HYDROCHLORIDE 20 MG/ML
10 INJECTION INTRAMUSCULAR; INTRAVENOUS EVERY 8 HOURS PRN
Status: DISCONTINUED | OUTPATIENT
Start: 2023-01-11 | End: 2023-01-12

## 2023-01-11 RX ORDER — HYDROMORPHONE HYDROCHLORIDE 2 MG/1
2 TABLET ORAL
Status: CANCELLED | OUTPATIENT
Start: 2023-01-11

## 2023-01-11 RX ADMIN — METHOCARBAMOL 500 MG: 100 INJECTION, SOLUTION INTRAMUSCULAR; INTRAVENOUS at 02:01

## 2023-01-11 RX ADMIN — FAMOTIDINE 20 MG: 10 INJECTION, SOLUTION INTRAVENOUS at 09:01

## 2023-01-11 RX ADMIN — ONDANSETRON 4 MG: 2 INJECTION INTRAMUSCULAR; INTRAVENOUS at 05:01

## 2023-01-11 RX ADMIN — MORPHINE SULFATE 2 MG: 2 INJECTION, SOLUTION INTRAMUSCULAR; INTRAVENOUS at 08:01

## 2023-01-11 RX ADMIN — HYDROMORPHONE HYDROCHLORIDE 0.5 MG: 1 INJECTION, SOLUTION INTRAMUSCULAR; INTRAVENOUS; SUBCUTANEOUS at 12:01

## 2023-01-11 RX ADMIN — FAMOTIDINE 20 MG: 10 INJECTION, SOLUTION INTRAVENOUS at 08:01

## 2023-01-11 RX ADMIN — HYDRALAZINE HYDROCHLORIDE 10 MG: 20 INJECTION, SOLUTION INTRAMUSCULAR; INTRAVENOUS at 02:01

## 2023-01-11 RX ADMIN — SODIUM CHLORIDE, SODIUM LACTATE, POTASSIUM CHLORIDE, AND CALCIUM CHLORIDE: 600; 310; 30; 20 INJECTION, SOLUTION INTRAVENOUS at 08:01

## 2023-01-11 RX ADMIN — SODIUM CHLORIDE, SODIUM LACTATE, POTASSIUM CHLORIDE, AND CALCIUM CHLORIDE: 600; 310; 30; 20 INJECTION, SOLUTION INTRAVENOUS at 05:01

## 2023-01-11 RX ADMIN — ENOXAPARIN SODIUM 40 MG: 40 INJECTION SUBCUTANEOUS at 05:01

## 2023-01-11 RX ADMIN — METHOCARBAMOL 500 MG: 100 INJECTION, SOLUTION INTRAMUSCULAR; INTRAVENOUS at 05:01

## 2023-01-11 RX ADMIN — MORPHINE SULFATE 2 MG: 2 INJECTION, SOLUTION INTRAMUSCULAR; INTRAVENOUS at 03:01

## 2023-01-11 RX ADMIN — MORPHINE SULFATE 4 MG: 4 INJECTION INTRAVENOUS at 04:01

## 2023-01-11 NOTE — NURSING
Patient complaining of bladder distension and the urge to urinate. Isaac catheter dry. Nurse readjusted catheter, no change in flow. MD on call paged, verbal to bladder scan. Bladder scan 288mL. MD paged, verbal to flush catheter. Patient repositioned, urine flow improves. Flush not needed. Will continue to monitor.

## 2023-01-11 NOTE — PROGRESS NOTES
[facility]  General Surgery  Progress Note    Subjective:     History of Present Illness:  Mr. Prescott is a 62 yoM with PMH including total colectomy with ileorectal anastomosis for polyposis in 2018 who presented to the ED on 12/30 with 1 week of worsening bloating, abdominal pain,and nausea/vomiting. Workup in the ED revealed a CT scan significant for potential transition point of the ileum in the RLQ with ileal distension and decompression of the proximal small bowel and stomach. He was admitted and passed a GGC challenge and was discharged on 12/31.    He re presents today with continued bloating, abdominal pain, nausea, and emesis. Repeat CT scan on 1/9 demonstrated mesenteric swirling about the ileorectal anastomosis with positioning of the small bowel in the right quadrant (vs the left on 12/30). No fever, chills, shortness of breath, or chest pain.      Post-Op Info:  Procedure(s) (LRB):  LAPAROTOMY, EXPLORATORY, revision of ileorectal anastomosis (N/A)  ANASTOMOSIS, ILEORECTAL   2 Days Post-Op     Interval History: Patient had NAEON. AF. VSS. Pain is well controlled.       Medications:  Continuous Infusions:   lactated ringers 125 mL/hr at 01/11/23 0842     Scheduled Meds:   enoxaparin  40 mg Subcutaneous Daily    famotidine (PF)  20 mg Intravenous BID    methocarbamol (ROBAXIN) IVPB  500 mg Intravenous Q8H     PRN Meds:HYDROmorphone, melatonin, morphine, morphine, naloxone, ondansetron, prochlorperazine, sodium chloride 0.9%     Review of patient's allergies indicates:   Allergen Reactions    Codeine      Itching with Codeine , Oxycodone     Oxycodone      Itching      Objective:     Vital Signs (Most Recent):  Temp: 96.3 °F (35.7 °C) (01/11/23 0744)  Pulse: 70 (01/11/23 0848)  Resp: 17 (01/11/23 0832)  BP: (!) 171/97 (01/11/23 0848)  SpO2: (!) 93 % (01/11/23 0744)   Vital Signs (24h Range):  Temp:  [96.3 °F (35.7 °C)-99 °F (37.2 °C)] 96.3 °F (35.7 °C)  Pulse:  [56-70] 70  Resp:  [16-18]  17  SpO2:  [93 %-98 %] 93 %  BP: (147-197)/() 171/97     Weight: 83.9 kg (185 lb)  Body mass index is 26.54 kg/m².    Intake/Output - Last 3 Shifts         01/09 0700  01/10 0659 01/10 0700  01/11 0659 01/11 0700  01/12 0659    P.O.  360     I.V. (mL/kg) 1500 (17.9)      IV Piggyback 2299      Total Intake(mL/kg) 3799 (45.3) 360 (4.3)     Urine (mL/kg/hr) 2150 1800 (0.9)     Emesis/NG output  0     Stool  0     Blood 30      Total Output 2180 1800     Net +1619 -1440                    Physical Exam  Vitals reviewed.   Constitutional:       Appearance: Normal appearance.   Cardiovascular:      Rate and Rhythm: Normal rate.      Pulses: Normal pulses.   Pulmonary:      Effort: Pulmonary effort is normal.   Abdominal:      General: There is distension.      Palpations: Abdomen is soft.      Tenderness: There is abdominal tenderness.      Comments: Slight distension stable. Dresings CDI. Sutures intact.    Skin:     General: Skin is warm and dry.   Neurological:      General: No focal deficit present.      Mental Status: He is alert and oriented to person, place, and time.       Significant Labs:  I have reviewed all pertinent lab results within the past 24 hours.    Significant Diagnostics:  I have reviewed all pertinent imaging results/findings within the past 24 hours.    Assessment/Plan:     * SBO (small bowel obstruction)  63 yo male with concern for mechanical obstruction and volvulus of the small bowel about a previous ileorectal anastomosis. Given the failure of prior conservative management and worsening imaging findings with persistent symptoms, we will proceed with exploratory laparotomy and lysis of adhesions. Indications for surgery, including risks and benefits discussed with Mr. Prescott, and he would like to proceed. Patient had procedure: exlap with revison of iliorectal anastomosis (1-9-23).     - Pain multimodal regimen  - MIVF   - DVT prophylaxis Lovenox   - Isaac   - PT/OT/ambulate   - IS  -  Nausea PRN  - Labs daily  - Continue clear diet and iv fluids    Hypokalemia  - Replete PRN  - Continue to monitor with daily CMP        Emigdio Mcguire DPM  General Surgery  [facility] - [facility]

## 2023-01-11 NOTE — NURSING
Nurse reassessed case catheter, draining clear, yellow urine. Patient complaining again of feeling

## 2023-01-11 NOTE — ASSESSMENT & PLAN NOTE
61 yo male with concern for mechanical obstruction and volvulus of the small bowel about a previous ileorectal anastomosis. Given the failure of prior conservative management and worsening imaging findings with persistent symptoms, we will proceed with exploratory laparotomy and lysis of adhesions. Indications for surgery, including risks and benefits discussed with  Genie, and he would like to proceed. Patient had procedure: exlap with revison of iliorectal anastomosis (1-9-23).     - Pain multimodal regimen  - MIVF   - DVT prophylaxis Lovenox   - Isaac   - PT/OT/ambulate   - IS  - Nausea PRN  - Labs daily  - Continue clear diet and iv fluids

## 2023-01-11 NOTE — SUBJECTIVE & OBJECTIVE
Interval History: Patient had NAEON. AF. VSS. Pain is well controlled.       Medications:  Continuous Infusions:   lactated ringers 125 mL/hr at 01/11/23 0842     Scheduled Meds:   enoxaparin  40 mg Subcutaneous Daily    famotidine (PF)  20 mg Intravenous BID    methocarbamol (ROBAXIN) IVPB  500 mg Intravenous Q8H     PRN Meds:HYDROmorphone, melatonin, morphine, morphine, naloxone, ondansetron, prochlorperazine, sodium chloride 0.9%     Review of patient's allergies indicates:   Allergen Reactions    Codeine      Itching with Codeine , Oxycodone     Oxycodone      Itching      Objective:     Vital Signs (Most Recent):  Temp: 96.3 °F (35.7 °C) (01/11/23 0744)  Pulse: 70 (01/11/23 0848)  Resp: 17 (01/11/23 0832)  BP: (!) 171/97 (01/11/23 0848)  SpO2: (!) 93 % (01/11/23 0744)   Vital Signs (24h Range):  Temp:  [96.3 °F (35.7 °C)-99 °F (37.2 °C)] 96.3 °F (35.7 °C)  Pulse:  [56-70] 70  Resp:  [16-18] 17  SpO2:  [93 %-98 %] 93 %  BP: (147-197)/() 171/97     Weight: 83.9 kg (185 lb)  Body mass index is 26.54 kg/m².    Intake/Output - Last 3 Shifts         01/09 0700  01/10 0659 01/10 0700  01/11 0659 01/11 0700  01/12 0659    P.O.  360     I.V. (mL/kg) 1500 (17.9)      IV Piggyback 2299      Total Intake(mL/kg) 3799 (45.3) 360 (4.3)     Urine (mL/kg/hr) 2150 1800 (0.9)     Emesis/NG output  0     Stool  0     Blood 30      Total Output 2180 1800     Net +1619 -1440                    Physical Exam  Vitals reviewed.   Constitutional:       Appearance: Normal appearance.   Cardiovascular:      Rate and Rhythm: Normal rate.      Pulses: Normal pulses.   Pulmonary:      Effort: Pulmonary effort is normal.   Abdominal:      General: There is distension.      Palpations: Abdomen is soft.      Tenderness: There is abdominal tenderness.      Comments: Slight distension stable. Dresings CDI. Sutures intact.    Skin:     General: Skin is warm and dry.   Neurological:      General: No focal deficit present.      Mental Status:  He is alert and oriented to person, place, and time.       Significant Labs:  I have reviewed all pertinent lab results within the past 24 hours.    Significant Diagnostics:  I have reviewed all pertinent imaging results/findings within the past 24 hours.

## 2023-01-12 LAB
ALBUMIN SERPL BCP-MCNC: 2.9 G/DL (ref 3.5–5.2)
ALP SERPL-CCNC: 138 U/L (ref 55–135)
ALT SERPL W/O P-5'-P-CCNC: 22 U/L (ref 10–44)
ANION GAP SERPL CALC-SCNC: 10 MMOL/L (ref 8–16)
AST SERPL-CCNC: 18 U/L (ref 10–40)
BASOPHILS # BLD AUTO: 0.02 K/UL (ref 0–0.2)
BASOPHILS NFR BLD: 0.2 % (ref 0–1.9)
BILIRUB SERPL-MCNC: 4.5 MG/DL (ref 0.1–1)
BUN SERPL-MCNC: 7 MG/DL (ref 6–20)
CALCIUM SERPL-MCNC: 8.6 MG/DL (ref 8.7–10.5)
CHLORIDE SERPL-SCNC: 100 MMOL/L (ref 95–110)
CO2 SERPL-SCNC: 23 MMOL/L (ref 23–29)
CREAT SERPL-MCNC: 0.6 MG/DL (ref 0.5–1.4)
CRP SERPL-MCNC: 194 MG/L (ref 0–8.2)
DIFFERENTIAL METHOD: ABNORMAL
EOSINOPHIL # BLD AUTO: 0 K/UL (ref 0–0.5)
EOSINOPHIL NFR BLD: 0.5 % (ref 0–8)
ERYTHROCYTE [DISTWIDTH] IN BLOOD BY AUTOMATED COUNT: 13.4 % (ref 11.5–14.5)
EST. GFR  (NO RACE VARIABLE): >60 ML/MIN/1.73 M^2
GLUCOSE SERPL-MCNC: 92 MG/DL (ref 70–110)
HCT VFR BLD AUTO: 44.6 % (ref 40–54)
HGB BLD-MCNC: 14.4 G/DL (ref 14–18)
IMM GRANULOCYTES # BLD AUTO: 0.07 K/UL (ref 0–0.04)
IMM GRANULOCYTES NFR BLD AUTO: 0.8 % (ref 0–0.5)
LYMPHOCYTES # BLD AUTO: 0.8 K/UL (ref 1–4.8)
LYMPHOCYTES NFR BLD: 8.9 % (ref 18–48)
MAGNESIUM SERPL-MCNC: 1.9 MG/DL (ref 1.6–2.6)
MCH RBC QN AUTO: 29.5 PG (ref 27–31)
MCHC RBC AUTO-ENTMCNC: 32.3 G/DL (ref 32–36)
MCV RBC AUTO: 91 FL (ref 82–98)
MONOCYTES # BLD AUTO: 0.7 K/UL (ref 0.3–1)
MONOCYTES NFR BLD: 8.5 % (ref 4–15)
NEUTROPHILS # BLD AUTO: 7.1 K/UL (ref 1.8–7.7)
NEUTROPHILS NFR BLD: 81.1 % (ref 38–73)
NRBC BLD-RTO: 0 /100 WBC
PHOSPHATE SERPL-MCNC: 2.1 MG/DL (ref 2.7–4.5)
PLATELET # BLD AUTO: 215 K/UL (ref 150–450)
PMV BLD AUTO: 10.3 FL (ref 9.2–12.9)
POTASSIUM SERPL-SCNC: 3.5 MMOL/L (ref 3.5–5.1)
PROT SERPL-MCNC: 5.9 G/DL (ref 6–8.4)
RBC # BLD AUTO: 4.88 M/UL (ref 4.6–6.2)
SODIUM SERPL-SCNC: 133 MMOL/L (ref 136–145)
WBC # BLD AUTO: 8.74 K/UL (ref 3.9–12.7)

## 2023-01-12 PROCEDURE — 25000003 PHARM REV CODE 250: Performed by: STUDENT IN AN ORGANIZED HEALTH CARE EDUCATION/TRAINING PROGRAM

## 2023-01-12 PROCEDURE — 85025 COMPLETE CBC W/AUTO DIFF WBC: CPT | Performed by: STUDENT IN AN ORGANIZED HEALTH CARE EDUCATION/TRAINING PROGRAM

## 2023-01-12 PROCEDURE — 63600175 PHARM REV CODE 636 W HCPCS: Performed by: STUDENT IN AN ORGANIZED HEALTH CARE EDUCATION/TRAINING PROGRAM

## 2023-01-12 PROCEDURE — 80053 COMPREHEN METABOLIC PANEL: CPT | Performed by: STUDENT IN AN ORGANIZED HEALTH CARE EDUCATION/TRAINING PROGRAM

## 2023-01-12 PROCEDURE — 83735 ASSAY OF MAGNESIUM: CPT | Performed by: STUDENT IN AN ORGANIZED HEALTH CARE EDUCATION/TRAINING PROGRAM

## 2023-01-12 PROCEDURE — 86140 C-REACTIVE PROTEIN: CPT | Performed by: STUDENT IN AN ORGANIZED HEALTH CARE EDUCATION/TRAINING PROGRAM

## 2023-01-12 PROCEDURE — 84100 ASSAY OF PHOSPHORUS: CPT | Performed by: STUDENT IN AN ORGANIZED HEALTH CARE EDUCATION/TRAINING PROGRAM

## 2023-01-12 PROCEDURE — 11000001 HC ACUTE MED/SURG PRIVATE ROOM

## 2023-01-12 PROCEDURE — 63600175 PHARM REV CODE 636 W HCPCS

## 2023-01-12 PROCEDURE — 25000003 PHARM REV CODE 250

## 2023-01-12 PROCEDURE — 36415 COLL VENOUS BLD VENIPUNCTURE: CPT | Performed by: STUDENT IN AN ORGANIZED HEALTH CARE EDUCATION/TRAINING PROGRAM

## 2023-01-12 PROCEDURE — 25000003 PHARM REV CODE 250: Performed by: SURGERY

## 2023-01-12 RX ORDER — HYDRALAZINE HYDROCHLORIDE 20 MG/ML
10 INJECTION INTRAMUSCULAR; INTRAVENOUS EVERY 4 HOURS PRN
Status: DISCONTINUED | OUTPATIENT
Start: 2023-01-12 | End: 2023-01-13

## 2023-01-12 RX ORDER — ACETAMINOPHEN 325 MG/1
650 TABLET ORAL EVERY 6 HOURS
Status: DISCONTINUED | OUTPATIENT
Start: 2023-01-12 | End: 2023-01-14 | Stop reason: HOSPADM

## 2023-01-12 RX ORDER — ATORVASTATIN CALCIUM 20 MG/1
20 TABLET, FILM COATED ORAL DAILY
Status: DISCONTINUED | OUTPATIENT
Start: 2023-01-12 | End: 2023-01-14 | Stop reason: HOSPADM

## 2023-01-12 RX ORDER — IBUPROFEN 400 MG/1
400 TABLET ORAL EVERY 6 HOURS
Status: DISCONTINUED | OUTPATIENT
Start: 2023-01-12 | End: 2023-01-14 | Stop reason: HOSPADM

## 2023-01-12 RX ORDER — LISINOPRIL 20 MG/1
20 TABLET ORAL DAILY
Status: DISCONTINUED | OUTPATIENT
Start: 2023-01-12 | End: 2023-01-14 | Stop reason: HOSPADM

## 2023-01-12 RX ORDER — HYDRALAZINE HYDROCHLORIDE 20 MG/ML
10 INJECTION INTRAMUSCULAR; INTRAVENOUS ONCE
Status: COMPLETED | OUTPATIENT
Start: 2023-01-12 | End: 2023-01-12

## 2023-01-12 RX ORDER — SODIUM CHLORIDE, SODIUM LACTATE, POTASSIUM CHLORIDE, CALCIUM CHLORIDE 600; 310; 30; 20 MG/100ML; MG/100ML; MG/100ML; MG/100ML
INJECTION, SOLUTION INTRAVENOUS CONTINUOUS
Status: DISCONTINUED | OUTPATIENT
Start: 2023-01-12 | End: 2023-01-14 | Stop reason: HOSPADM

## 2023-01-12 RX ORDER — LABETALOL HCL 20 MG/4 ML
20 SYRINGE (ML) INTRAVENOUS EVERY 4 HOURS PRN
Status: DISCONTINUED | OUTPATIENT
Start: 2023-01-12 | End: 2023-01-14 | Stop reason: HOSPADM

## 2023-01-12 RX ORDER — HYDROCHLOROTHIAZIDE 12.5 MG/1
12.5 TABLET ORAL DAILY
Status: DISCONTINUED | OUTPATIENT
Start: 2023-01-12 | End: 2023-01-14 | Stop reason: HOSPADM

## 2023-01-12 RX ORDER — IBUPROFEN 400 MG/1
400 TABLET ORAL EVERY 6 HOURS PRN
Status: DISCONTINUED | OUTPATIENT
Start: 2023-01-12 | End: 2023-01-12

## 2023-01-12 RX ORDER — LISINOPRIL AND HYDROCHLOROTHIAZIDE 12.5; 2 MG/1; MG/1
1 TABLET ORAL DAILY
Status: DISCONTINUED | OUTPATIENT
Start: 2023-01-12 | End: 2023-01-12

## 2023-01-12 RX ORDER — METOPROLOL TARTRATE 1 MG/ML
5 INJECTION, SOLUTION INTRAVENOUS EVERY 6 HOURS PRN
Status: DISCONTINUED | OUTPATIENT
Start: 2023-01-12 | End: 2023-01-12

## 2023-01-12 RX ORDER — AMLODIPINE BESYLATE 5 MG/1
5 TABLET ORAL DAILY
Status: DISCONTINUED | OUTPATIENT
Start: 2023-01-12 | End: 2023-01-12

## 2023-01-12 RX ORDER — ACETAMINOPHEN 325 MG/1
650 TABLET ORAL EVERY 6 HOURS PRN
Status: DISCONTINUED | OUTPATIENT
Start: 2023-01-12 | End: 2023-01-12

## 2023-01-12 RX ORDER — METHOCARBAMOL 500 MG/1
500 TABLET, FILM COATED ORAL 4 TIMES DAILY
Status: DISCONTINUED | OUTPATIENT
Start: 2023-01-12 | End: 2023-01-14 | Stop reason: HOSPADM

## 2023-01-12 RX ADMIN — Medication 6 MG: at 12:01

## 2023-01-12 RX ADMIN — METHOCARBAMOL 500 MG: 500 TABLET ORAL at 08:01

## 2023-01-12 RX ADMIN — HYDRALAZINE HYDROCHLORIDE 10 MG: 20 INJECTION, SOLUTION INTRAMUSCULAR; INTRAVENOUS at 08:01

## 2023-01-12 RX ADMIN — ATORVASTATIN CALCIUM 20 MG: 20 TABLET, FILM COATED ORAL at 08:01

## 2023-01-12 RX ADMIN — SODIUM CHLORIDE, SODIUM LACTATE, POTASSIUM CHLORIDE, AND CALCIUM CHLORIDE: 600; 310; 30; 20 INJECTION, SOLUTION INTRAVENOUS at 12:01

## 2023-01-12 RX ADMIN — FAMOTIDINE 20 MG: 10 INJECTION, SOLUTION INTRAVENOUS at 08:01

## 2023-01-12 RX ADMIN — HYDROMORPHONE HYDROCHLORIDE 2 MG: 2 TABLET ORAL at 11:01

## 2023-01-12 RX ADMIN — HYDRALAZINE HYDROCHLORIDE 10 MG: 20 INJECTION, SOLUTION INTRAMUSCULAR; INTRAVENOUS at 01:01

## 2023-01-12 RX ADMIN — HYDRALAZINE HYDROCHLORIDE 10 MG: 20 INJECTION, SOLUTION INTRAMUSCULAR; INTRAVENOUS at 04:01

## 2023-01-12 RX ADMIN — HYDROMORPHONE HYDROCHLORIDE 4 MG: 2 TABLET ORAL at 08:01

## 2023-01-12 RX ADMIN — ACETAMINOPHEN 650 MG: 325 TABLET ORAL at 05:01

## 2023-01-12 RX ADMIN — ENOXAPARIN SODIUM 40 MG: 40 INJECTION SUBCUTANEOUS at 05:01

## 2023-01-12 RX ADMIN — SODIUM CHLORIDE, SODIUM LACTATE, POTASSIUM CHLORIDE, AND CALCIUM CHLORIDE: 600; 310; 30; 20 INJECTION, SOLUTION INTRAVENOUS at 01:01

## 2023-01-12 RX ADMIN — METOROPROLOL TARTRATE 5 MG: 5 INJECTION, SOLUTION INTRAVENOUS at 02:01

## 2023-01-12 RX ADMIN — LABETALOL HYDROCHLORIDE 20 MG: 5 INJECTION, SOLUTION INTRAVENOUS at 11:01

## 2023-01-12 RX ADMIN — LISINOPRIL 20 MG: 20 TABLET ORAL at 08:01

## 2023-01-12 RX ADMIN — POTASSIUM PHOSPHATE, MONOBASIC AND POTASSIUM PHOSPHATE, DIBASIC 20 MMOL: 224; 236 INJECTION, SOLUTION, CONCENTRATE INTRAVENOUS at 09:01

## 2023-01-12 RX ADMIN — METHOCARBAMOL 500 MG: 500 TABLET ORAL at 04:01

## 2023-01-12 RX ADMIN — LABETALOL HYDROCHLORIDE 20 MG: 5 INJECTION, SOLUTION INTRAVENOUS at 05:01

## 2023-01-12 RX ADMIN — ONDANSETRON 4 MG: 2 INJECTION INTRAMUSCULAR; INTRAVENOUS at 09:01

## 2023-01-12 RX ADMIN — HYDROMORPHONE HYDROCHLORIDE 4 MG: 2 TABLET ORAL at 04:01

## 2023-01-12 RX ADMIN — HYDROCHLOROTHIAZIDE 12.5 MG: 12.5 TABLET ORAL at 08:01

## 2023-01-12 RX ADMIN — IBUPROFEN 400 MG: 400 TABLET ORAL at 05:01

## 2023-01-12 RX ADMIN — Medication 6 MG: at 08:01

## 2023-01-12 NOTE — PROGRESS NOTES
[facility]  General Surgery  Progress Note    Subjective:     History of Present Illness:  Mr. Prescott is a 62 yoM with PMH including total colectomy with ileorectal anastomosis for polyposis in 2018 who presented to the ED on 12/30 with 1 week of worsening bloating, abdominal pain,and nausea/vomiting. Workup in the ED revealed a CT scan significant for potential transition point of the ileum in the RLQ with ileal distension and decompression of the proximal small bowel and stomach. He was admitted and passed a GGC challenge and was discharged on 12/31.    He re presents today with continued bloating, abdominal pain, nausea, and emesis. Repeat CT scan on 1/9 demonstrated mesenteric swirling about the ileorectal anastomosis with positioning of the small bowel in the right quadrant (vs the left on 12/30). No fever, chills, shortness of breath, or chest pain.      Post-Op Info:  Procedure(s) (LRB):  LAPAROTOMY, EXPLORATORY, revision of ileorectal anastomosis (N/A)  ANASTOMOSIS, ILEORECTAL   3 Days Post-Op     Interval History: NAEON. AF. VSS. Pain is well controlled. Denies nausea or vomiting. Tolerating diet well       Medications:  Continuous Infusions:   lactated ringers 125 mL/hr at 01/12/23 0012     Scheduled Meds:   atorvastatin  20 mg Oral Daily    enoxaparin  40 mg Subcutaneous Daily    famotidine (PF)  20 mg Intravenous BID    lisinopriL-hydrochlorothiazide  1 tablet Oral Daily     PRN Meds:hydrALAZINE, HYDROmorphone, HYDROmorphone, HYDROmorphone, labetalol, melatonin, naloxone, ondansetron, prochlorperazine, sodium chloride 0.9%     Review of patient's allergies indicates:   Allergen Reactions    Codeine      Itching with Codeine , Oxycodone     Oxycodone      Itching      Objective:     Vital Signs (Most Recent):  Temp: 99 °F (37.2 °C) (01/12/23 0703)  Pulse: 71 (01/12/23 0703)  Resp: 18 (01/12/23 0703)  BP: (!) 188/108 (01/12/23 0703)  SpO2: 95 % (01/12/23 0703)   Vital Signs (24h  Range):  Temp:  [96.3 °F (35.7 °C)-99.1 °F (37.3 °C)] 99 °F (37.2 °C)  Pulse:  [67-80] 71  Resp:  [17-18] 18  SpO2:  [93 %-95 %] 95 %  BP: (170-199)/() 188/108     Weight: 83.9 kg (185 lb)  Body mass index is 26.54 kg/m².    Intake/Output - Last 3 Shifts         01/10 0700 01/11 0659 01/11 0700 01/12 0659 01/12 0700 01/13 0659    P.O. 360      I.V. (mL/kg)       IV Piggyback       Total Intake(mL/kg) 360 (4.3)      Urine (mL/kg/hr) 1800 (0.9)      Emesis/NG output 0      Stool 0      Blood       Total Output 1800      Net -1440                     Physical Exam  Vitals reviewed.   Constitutional:       Appearance: Normal appearance.   Cardiovascular:      Rate and Rhythm: Normal rate.      Pulses: Normal pulses.   Pulmonary:      Effort: Pulmonary effort is normal.   Abdominal:      Palpations: Abdomen is soft.      Tenderness: There is no abdominal tenderness.      Comments: Slight distension stable. Dresings CDI. Sutures intact.    Skin:     General: Skin is warm and dry.   Neurological:      General: No focal deficit present.      Mental Status: He is alert and oriented to person, place, and time.       Significant Labs:  I have reviewed all pertinent lab results within the past 24 hours.    Significant Diagnostics:  I have reviewed all pertinent imaging results/findings within the past 24 hours.    Assessment/Plan:     * SBO (small bowel obstruction)  61 yo male with concern for mechanical obstruction and volvulus of the small bowel about a previous ileorectal anastomosis. Given the failure of prior conservative management and worsening imaging findings with persistent symptoms, we will proceed with exploratory laparotomy and lysis of adhesions. Indications for surgery, including risks and benefits discussed with Mr. Prescott, and he would like to proceed. Patient had procedure: exlap with revison of iliorectal anastomosis (1-9-23).     - Pain multimodal regimen, transitioning to PO pain meds  - DVT  prophylaxis Lovenox   - PT/OT/ambulate   - IS  - Nausea PRN  - Labs daily  - Continue CLD  - Restart home meds    Hypokalemia  - Replete PRN  - Continue to monitor with daily CMP        Khoa Lopez MD  General Surgery  [facility]

## 2023-01-12 NOTE — OP NOTE
DATE: 1/9/22.    PREOPERATIVE DIAGNOSIS:  Small-bowel obstruction volvulus.    POSTOPERATIVE DIAGNOSIS:  Small bowel obstruction volvulus.    PROCEDURE:   1. Exploratory laparotomy.    2. Revision of ileocolic anastomosis.    ATTENDING SURGEON: Garrick Hennessy MD.    RESIDENT: Quyen Vegas MD.    ANESTHESIA:  General.    INDICATION:  Patient is a 62-year-old man who presents with recurrent bowel obstruction and CT evidence intestinal volvulus.  We recommended urgent exploration.    PROCEDURE IN DETAIL:  Patient was taken to the operating room and placed supine.  After induction of general endotracheal tube anesthesia, his abdomen was prepped and draped in the standard fashion.  Time-out was performed.  Midline incision was made and was carried down to the fascia, which was elevated and widely opened.  Abdomen was uneventfully entered.  We then performed an exploration.  Small bowel was run from the ligament of Treitz distally.  There was no adhesions present and could be run all the way to the ileal rectal anastomosis.  On evaluation of this anastomosis it appeared that the anastomosis was twisted, likely I creation.  There was a large mesocolic defect present, but I could not identify an internal hernia as cause.  There was no bowel ischemia or significant inflammatory change.  Bowel appeared chronically dilated.  We decided to revise the anastomosis and removed the twist.  On evaluation, it appeared that there was several cm of sigmoid colon remaining.  Colon was divided passed at staple line with TA stapler.  The ileum was detorsed.  Small enterotomy was made in the ileum and the chronically dilated bowel was decompressed with a NG tube, approximately 2 L was evacuated.  We then created a side-to-side stapled ileocolic anastomosis with VIRGILIO stapler.  Common enterotomy was closed in 2 layers with running 3-0 V lock suture.  Evaluation and was suture line appeared widely patent without ischemia other concerning  findings.  We submerged the anastomosis in saline and proctoscopy was performed as a leak test.  There was no evidence of leak with excellent distention and movement across the anastomosis.  Hemostasis was confirmed.  The mesocolic defect was then carefully tacked down to the retroperitoneum with Vicryl suture to eliminate the large internal hernia defect.  Fascia was closed with running 0 looped PDS suture and skin was closed with staples.  Sterile dressing was applied.  Patient was awakened from anesthesia and transferred to the PACU in good condition.  All needle and sponge counts were correct at the conclusion of the case.  I was present for the procedure in its entirety.    EBL:  5 mL.    FINDINGS:  Chronically twisted ileum at ileorectal anastomosis.  Appeared to have likely been twisted with creation.  No internal hernia problem identified.  Ileocolic anastomosis revised.

## 2023-01-12 NOTE — NURSING
Patient complaining of abdominal pain and nausea. Pt not passing gas. General surgery on-call notified. KUB ordered

## 2023-01-12 NOTE — ASSESSMENT & PLAN NOTE
61 yo male with concern for mechanical obstruction and volvulus of the small bowel about a previous ileorectal anastomosis. Given the failure of prior conservative management and worsening imaging findings with persistent symptoms, we will proceed with exploratory laparotomy and lysis of adhesions. Indications for surgery, including risks and benefits discussed with  Genie, and he would like to proceed. Patient had procedure: exlap with revison of iliorectal anastomosis (1-9-23).     - Pain multimodal regimen, transitioning to PO pain meds  - DVT prophylaxis Lovenox   - PT/OT/ambulate   - IS  - Nausea PRN  - Labs daily  - Continue CLD  - Restart home meds

## 2023-01-12 NOTE — SUBJECTIVE & OBJECTIVE
Interval History: NAEON. AF. VSS. Pain is well controlled. Denies nausea or vomiting. Tolerating diet well       Medications:  Continuous Infusions:   lactated ringers 125 mL/hr at 01/12/23 0012     Scheduled Meds:   atorvastatin  20 mg Oral Daily    enoxaparin  40 mg Subcutaneous Daily    famotidine (PF)  20 mg Intravenous BID    lisinopriL-hydrochlorothiazide  1 tablet Oral Daily     PRN Meds:hydrALAZINE, HYDROmorphone, HYDROmorphone, HYDROmorphone, labetalol, melatonin, naloxone, ondansetron, prochlorperazine, sodium chloride 0.9%     Review of patient's allergies indicates:   Allergen Reactions    Codeine      Itching with Codeine , Oxycodone     Oxycodone      Itching      Objective:     Vital Signs (Most Recent):  Temp: 99 °F (37.2 °C) (01/12/23 0703)  Pulse: 71 (01/12/23 0703)  Resp: 18 (01/12/23 0703)  BP: (!) 188/108 (01/12/23 0703)  SpO2: 95 % (01/12/23 0703)   Vital Signs (24h Range):  Temp:  [96.3 °F (35.7 °C)-99.1 °F (37.3 °C)] 99 °F (37.2 °C)  Pulse:  [67-80] 71  Resp:  [17-18] 18  SpO2:  [93 %-95 %] 95 %  BP: (170-199)/() 188/108     Weight: 83.9 kg (185 lb)  Body mass index is 26.54 kg/m².    Intake/Output - Last 3 Shifts         01/10 0700  01/11 0659 01/11 0700  01/12 0659 01/12 0700  01/13 0659    P.O. 360      I.V. (mL/kg)       IV Piggyback       Total Intake(mL/kg) 360 (4.3)      Urine (mL/kg/hr) 1800 (0.9)      Emesis/NG output 0      Stool 0      Blood       Total Output 1800      Net -1440                     Physical Exam  Vitals reviewed.   Constitutional:       Appearance: Normal appearance.   Cardiovascular:      Rate and Rhythm: Normal rate.      Pulses: Normal pulses.   Pulmonary:      Effort: Pulmonary effort is normal.   Abdominal:      Palpations: Abdomen is soft.      Tenderness: There is no abdominal tenderness.      Comments: Slight distension stable. Dresings CDI. Sutures intact.    Skin:     General: Skin is warm and dry.   Neurological:      General: No focal deficit  present.      Mental Status: He is alert and oriented to person, place, and time.       Significant Labs:  I have reviewed all pertinent lab results within the past 24 hours.    Significant Diagnostics:  I have reviewed all pertinent imaging results/findings within the past 24 hours.

## 2023-01-13 LAB
ALBUMIN SERPL BCP-MCNC: 2.9 G/DL (ref 3.5–5.2)
ALP SERPL-CCNC: 63 U/L (ref 55–135)
ALT SERPL W/O P-5'-P-CCNC: 22 U/L (ref 10–44)
ANION GAP SERPL CALC-SCNC: 10 MMOL/L (ref 8–16)
AST SERPL-CCNC: 15 U/L (ref 10–40)
BASOPHILS # BLD AUTO: 0.02 K/UL (ref 0–0.2)
BASOPHILS NFR BLD: 0.4 % (ref 0–1.9)
BILIRUB SERPL-MCNC: 3.8 MG/DL (ref 0.1–1)
BUN SERPL-MCNC: 11 MG/DL (ref 6–20)
CALCIUM SERPL-MCNC: 8.8 MG/DL (ref 8.7–10.5)
CHLORIDE SERPL-SCNC: 96 MMOL/L (ref 95–110)
CO2 SERPL-SCNC: 28 MMOL/L (ref 23–29)
CREAT SERPL-MCNC: 0.6 MG/DL (ref 0.5–1.4)
CRP SERPL-MCNC: 112.1 MG/L (ref 0–8.2)
DIFFERENTIAL METHOD: ABNORMAL
EOSINOPHIL # BLD AUTO: 0 K/UL (ref 0–0.5)
EOSINOPHIL NFR BLD: 0.8 % (ref 0–8)
ERYTHROCYTE [DISTWIDTH] IN BLOOD BY AUTOMATED COUNT: 13.4 % (ref 11.5–14.5)
EST. GFR  (NO RACE VARIABLE): >60 ML/MIN/1.73 M^2
GLUCOSE SERPL-MCNC: 130 MG/DL (ref 70–110)
HCT VFR BLD AUTO: 43.8 % (ref 40–54)
HGB BLD-MCNC: 14.6 G/DL (ref 14–18)
IMM GRANULOCYTES # BLD AUTO: 0.02 K/UL (ref 0–0.04)
IMM GRANULOCYTES NFR BLD AUTO: 0.4 % (ref 0–0.5)
LYMPHOCYTES # BLD AUTO: 0.5 K/UL (ref 1–4.8)
LYMPHOCYTES NFR BLD: 10.8 % (ref 18–48)
MAGNESIUM SERPL-MCNC: 2 MG/DL (ref 1.6–2.6)
MCH RBC QN AUTO: 29.3 PG (ref 27–31)
MCHC RBC AUTO-ENTMCNC: 33.3 G/DL (ref 32–36)
MCV RBC AUTO: 88 FL (ref 82–98)
MONOCYTES # BLD AUTO: 0.8 K/UL (ref 0.3–1)
MONOCYTES NFR BLD: 15.2 % (ref 4–15)
NEUTROPHILS # BLD AUTO: 3.6 K/UL (ref 1.8–7.7)
NEUTROPHILS NFR BLD: 72.4 % (ref 38–73)
NRBC BLD-RTO: 0 /100 WBC
PHOSPHATE SERPL-MCNC: 3.1 MG/DL (ref 2.7–4.5)
PLATELET # BLD AUTO: 303 K/UL (ref 150–450)
PMV BLD AUTO: 10.6 FL (ref 9.2–12.9)
POTASSIUM SERPL-SCNC: 3.3 MMOL/L (ref 3.5–5.1)
PROT SERPL-MCNC: 6 G/DL (ref 6–8.4)
RBC # BLD AUTO: 4.99 M/UL (ref 4.6–6.2)
SODIUM SERPL-SCNC: 134 MMOL/L (ref 136–145)
WBC # BLD AUTO: 5 K/UL (ref 3.9–12.7)

## 2023-01-13 PROCEDURE — 63600175 PHARM REV CODE 636 W HCPCS: Performed by: STUDENT IN AN ORGANIZED HEALTH CARE EDUCATION/TRAINING PROGRAM

## 2023-01-13 PROCEDURE — 11000001 HC ACUTE MED/SURG PRIVATE ROOM

## 2023-01-13 PROCEDURE — 97535 SELF CARE MNGMENT TRAINING: CPT

## 2023-01-13 PROCEDURE — 25000003 PHARM REV CODE 250

## 2023-01-13 PROCEDURE — 94799 UNLISTED PULMONARY SVC/PX: CPT

## 2023-01-13 PROCEDURE — 86140 C-REACTIVE PROTEIN: CPT | Performed by: STUDENT IN AN ORGANIZED HEALTH CARE EDUCATION/TRAINING PROGRAM

## 2023-01-13 PROCEDURE — 25000003 PHARM REV CODE 250: Performed by: STUDENT IN AN ORGANIZED HEALTH CARE EDUCATION/TRAINING PROGRAM

## 2023-01-13 PROCEDURE — 97116 GAIT TRAINING THERAPY: CPT

## 2023-01-13 PROCEDURE — 84100 ASSAY OF PHOSPHORUS: CPT | Performed by: STUDENT IN AN ORGANIZED HEALTH CARE EDUCATION/TRAINING PROGRAM

## 2023-01-13 PROCEDURE — 85025 COMPLETE CBC W/AUTO DIFF WBC: CPT | Performed by: STUDENT IN AN ORGANIZED HEALTH CARE EDUCATION/TRAINING PROGRAM

## 2023-01-13 PROCEDURE — 63600175 PHARM REV CODE 636 W HCPCS

## 2023-01-13 PROCEDURE — 80053 COMPREHEN METABOLIC PANEL: CPT | Performed by: STUDENT IN AN ORGANIZED HEALTH CARE EDUCATION/TRAINING PROGRAM

## 2023-01-13 PROCEDURE — 97165 OT EVAL LOW COMPLEX 30 MIN: CPT

## 2023-01-13 PROCEDURE — 36415 COLL VENOUS BLD VENIPUNCTURE: CPT | Performed by: STUDENT IN AN ORGANIZED HEALTH CARE EDUCATION/TRAINING PROGRAM

## 2023-01-13 PROCEDURE — 97161 PT EVAL LOW COMPLEX 20 MIN: CPT

## 2023-01-13 PROCEDURE — 83735 ASSAY OF MAGNESIUM: CPT | Performed by: STUDENT IN AN ORGANIZED HEALTH CARE EDUCATION/TRAINING PROGRAM

## 2023-01-13 PROCEDURE — 94761 N-INVAS EAR/PLS OXIMETRY MLT: CPT

## 2023-01-13 RX ORDER — SIMETHICONE 80 MG
1 TABLET,CHEWABLE ORAL 3 TIMES DAILY PRN
Status: DISCONTINUED | OUTPATIENT
Start: 2023-01-13 | End: 2023-01-14 | Stop reason: HOSPADM

## 2023-01-13 RX ORDER — POTASSIUM CHLORIDE 7.45 MG/ML
10 INJECTION INTRAVENOUS
Status: DISPENSED | OUTPATIENT
Start: 2023-01-13 | End: 2023-01-13

## 2023-01-13 RX ORDER — HYDRALAZINE HYDROCHLORIDE 20 MG/ML
20 INJECTION INTRAMUSCULAR; INTRAVENOUS EVERY 4 HOURS PRN
Status: DISCONTINUED | OUTPATIENT
Start: 2023-01-13 | End: 2023-01-14 | Stop reason: HOSPADM

## 2023-01-13 RX ORDER — POTASSIUM CHLORIDE 20 MEQ/1
40 TABLET, EXTENDED RELEASE ORAL
Status: DISCONTINUED | OUTPATIENT
Start: 2023-01-13 | End: 2023-01-13

## 2023-01-13 RX ADMIN — POTASSIUM CHLORIDE 10 MEQ: 7.46 INJECTION, SOLUTION INTRAVENOUS at 02:01

## 2023-01-13 RX ADMIN — LISINOPRIL 20 MG: 20 TABLET ORAL at 09:01

## 2023-01-13 RX ADMIN — HYDROMORPHONE HYDROCHLORIDE 4 MG: 2 TABLET ORAL at 03:01

## 2023-01-13 RX ADMIN — METHOCARBAMOL 500 MG: 500 TABLET ORAL at 09:01

## 2023-01-13 RX ADMIN — POTASSIUM CHLORIDE 10 MEQ: 7.46 INJECTION, SOLUTION INTRAVENOUS at 01:01

## 2023-01-13 RX ADMIN — SODIUM CHLORIDE, SODIUM LACTATE, POTASSIUM CHLORIDE, AND CALCIUM CHLORIDE: 600; 310; 30; 20 INJECTION, SOLUTION INTRAVENOUS at 01:01

## 2023-01-13 RX ADMIN — HYDRALAZINE HYDROCHLORIDE 10 MG: 20 INJECTION, SOLUTION INTRAMUSCULAR; INTRAVENOUS at 06:01

## 2023-01-13 RX ADMIN — ONDANSETRON 4 MG: 2 INJECTION INTRAMUSCULAR; INTRAVENOUS at 04:01

## 2023-01-13 RX ADMIN — Medication 6 MG: at 09:01

## 2023-01-13 RX ADMIN — POTASSIUM CHLORIDE 10 MEQ: 7.46 INJECTION, SOLUTION INTRAVENOUS at 12:01

## 2023-01-13 RX ADMIN — SIMETHICONE 80 MG: 80 TABLET, CHEWABLE ORAL at 06:01

## 2023-01-13 RX ADMIN — FAMOTIDINE 20 MG: 10 INJECTION, SOLUTION INTRAVENOUS at 09:01

## 2023-01-13 RX ADMIN — ENOXAPARIN SODIUM 40 MG: 40 INJECTION SUBCUTANEOUS at 05:01

## 2023-01-13 RX ADMIN — SIMETHICONE 80 MG: 80 TABLET, CHEWABLE ORAL at 09:01

## 2023-01-13 RX ADMIN — ACETAMINOPHEN 650 MG: 325 TABLET ORAL at 12:01

## 2023-01-13 RX ADMIN — POTASSIUM CHLORIDE 10 MEQ: 7.46 INJECTION, SOLUTION INTRAVENOUS at 11:01

## 2023-01-13 RX ADMIN — PROCHLORPERAZINE EDISYLATE 5 MG: 5 INJECTION INTRAMUSCULAR; INTRAVENOUS at 09:01

## 2023-01-13 RX ADMIN — HYDROCHLOROTHIAZIDE 12.5 MG: 12.5 TABLET ORAL at 09:01

## 2023-01-13 RX ADMIN — SODIUM CHLORIDE, SODIUM LACTATE, POTASSIUM CHLORIDE, AND CALCIUM CHLORIDE: 600; 310; 30; 20 INJECTION, SOLUTION INTRAVENOUS at 03:01

## 2023-01-13 RX ADMIN — POTASSIUM CHLORIDE 10 MEQ: 7.46 INJECTION, SOLUTION INTRAVENOUS at 10:01

## 2023-01-13 RX ADMIN — IBUPROFEN 400 MG: 400 TABLET ORAL at 12:01

## 2023-01-13 NOTE — PT/OT/SLP EVAL
Occupational Therapy   Evaluation and Discharge Note    Name: Jenni Prescott Jr.  MRN: [id number]  Admitting Diagnosis: SBO (small bowel obstruction)  Recent Surgery: Procedure(s) (LRB):  LAPAROTOMY, EXPLORATORY, revision of ileorectal anastomosis (N/A)  ANASTOMOSIS, ILEORECTAL 4 Days Post-Op    Recommendations:     Discharge Recommendations: home  Discharge Equipment Recommendations: none  Barriers to discharge:  None    Assessment:     Jenni Prescott Jr. is a 62 y.o. male with a medical diagnosis of SBO (small bowel obstruction). At this time, patient is functioning at their prior level of function and does not require further acute OT services.     Plan:     During this hospitalization, patient does not require further acute OT services.  Please re-consult if situation changes.    Plan of Care Reviewed with: patient, spouse    Subjective     Chief Complaint: abdominal pain/gas  Patient/Family Comments/goals: return to PLOF    Occupational Profile:  Living Environment: Pt lives with his spouse in a H with no steps. He has a t/s combo  Previous level of function: I PTA  Roles and Routines: ; pt (+) drives and works in construction  Equipment Used at home: none  Assistance upon Discharge: wife    Pain/Comfort:  Pain Rating 1: 2/10  Location - Orientation 1: generalized  Location 1: abdomen  Pain Addressed 1: Reposition, Distraction, Cessation of Activity  Pain Rating Post-Intervention 1: 1/10    Patients cultural, spiritual, Evangelical conflicts given the current situation: no    Objective:   Co-treatment performed due to patient's multiple deficits requiring two skilled therapists to appropriately and safely assess patient's strength and endurance while facilitating functional tasks in addition to accommodating for patient's activity tolerance.      Additional staff present:  CARLIE Marcus and JESUS Valdez    Communicated with: RN prior to session.  Patient found HOB elevated with peripheral IV, telemetry  upon OT entry to room.    General Precautions: Standard, fall  Orthopedic Precautions: N/A  Braces: N/A  Respiratory Status: Room air     Occupational Performance:    Bed Mobility:    Patient completed Scooting/Bridging with modified independence  Patient completed Supine to Sit with modified independence  Patient completed Sit to Supine with modified independence    Functional Mobility/Transfers:  Patient completed Sit <> Stand Transfer with independence  with  no assistive device   Functional Mobility: Pt completed functional ambulation of household distances across room and hallway with (I) and no AD  No LOB or SOB observed    Activities of Daily Living:  Upper Body Dressing: set up (A) to don/doff gown posteriorly seated on EOB  Lower Body Dressing: independence as pt donned B slip on shoes in standing with unilateral support on wall to steady and doffed seated EOB    Cognitive/Visual Perceptual:  Cognitive/Psychosocial Skills:     -       Oriented to: Person, Place, Time, and Situation   -       Follows Commands/attention:Follows multistep  commands  -       Safety awareness/insight to disability: intact   -       Mood/Affect/Coping skills/emotional control: Cooperative    Physical Exam:  Upper Extremity Range of Motion:     -       Right Upper Extremity: WFL  -       Left Upper Extremity: WFL  Upper Extremity Strength:    -       Right Upper Extremity: WFL  -       Left Upper Extremity: WFL    AMPAC 6 Click ADL:  AMPAC Total Score: 24    Treatment & Education:  Provided education regarding role of OT & D/C discharge recommendations with pt & spouse verbalizing understanding.  Pt had no further questions & when asked whether there were any concerns pt reported none.     Patient left HOB elevated with all lines intact, call button in reach, RN notified, and wife present    GOALS:   Multidisciplinary Problems       Occupational Therapy Goals       Not on file                    History:     Past Medical History:    Diagnosis Date    GERD (gastroesophageal reflux disease)     Taking Protonix    Hyperlipidemia     Hypertension     Neuropathic pain of right thigh     History         Past Surgical History:   Procedure Laterality Date    ANASTOMOSIS OF ILEUM TO RECTUM  1/9/2023    Procedure: ANASTOMOSIS, ILEORECTAL;  Surgeon: Garrick Hennessy MD;  Location: [facility] OR 2ND FLR;  Service: General;;    COLONOSCOPY N/A 1/8/2018    Procedure: COLONOSCOPY;  Surgeon: Carson Yañez Jr., MD;  Location: [facility];  Service: Endoscopy;  Laterality: N/A;    COLONOSCOPY N/A 2/19/2018    Procedure: COLONOSCOPY/Polypectomy;  Surgeon: Rex Blanco MD;  Location: [facility] ENDO;  Service: Endoscopy;  Laterality: N/A;    COLONOSCOPY N/A 5/21/2018    Procedure: COLONOSCOPY/Miralax Split;  Surgeon: Malvin Lozano MD;  Location: [facility];  Service: Endoscopy;  Laterality: N/A;    COLONOSCOPY N/A 6/11/2018    Procedure: COLONOSCOPY;  Surgeon: Papa Lopez MD;  Location: [facility] (4TH FLR);  Service: Endoscopy;  Laterality: N/A;  Miralax prep-ok per Dr Lopez    FLEXIBLE SIGMOIDOSCOPY N/A 3/11/2019    Procedure: SIGMOIDOSCOPY, FLEXIBLE;  Surgeon: Wilfred Rahman MD;  Location: [facility] (4TH FLR);  Service: Endoscopy;  Laterality: N/A;    INSERTION OF CATHETER Left 9/5/2018    Procedure: Insertion-Catheter;  Surgeon: Mohan Ventura MD;  Location: [facility] OR 2ND FLR;  Service: Urology;  Laterality: Left;    KNEE SURGERY Bilateral     states he has screws    KNEE SURGERY      3 reconstructive surgeries 20yrs go     LAPAROSCOPIC TOTAL COLECTOMY Left 9/5/2018    Procedure: COLECTOMY, TOTAL, LAPAROSCOPIC - lap total;  Surgeon: Papa Lopez MD;  Location: [facility] OR 2ND FLR;  Service: Colon and Rectal;  Laterality: Left;  preop center  DIFFICULT INTUBATION GLIDESCOPE USED    LAPAROTOMY, EXPLORATORY N/A 1/9/2023    Procedure: LAPAROTOMY, EXPLORATORY, revision of ileorectal anastomosis;  Surgeon: Garrick Hennessy MD;  Location: [facility]  OR 2ND FLR;  Service: General;  Laterality: N/A;    WRIST SURGERY      2015       Time Tracking:     OT Date of Treatment: 01/13/23  OT Start Time: 1315  OT Stop Time: 1330  OT Total Time (min): 15 min    Billable Minutes:Evaluation 7  Self Care/Home Management 8    1/13/2023

## 2023-01-13 NOTE — PT/OT/SLP EVAL
Physical Therapy  Evaluation and Discharge    Jenni Prescott Jr.   [id number]    Time Tracking:     PT Received On: 01/13/23   PT Start Time: 1314   PT Stop Time: 1330   PT Total Time (min): 16 min    Billable Minutes: Evaluation 8 and Gait Training 8 minutes       Recommendations:     Discharge recommendations: Home with family     Equipment recommendations: None    Barriers to Discharge: None    Patient Information:     Recent Surgery: Procedure(s) (LRB):  LAPAROTOMY, EXPLORATORY, revision of ileorectal anastomosis (N/A)  ANASTOMOSIS, ILEORECTAL 4 Days Post-Op    Diagnosis: SBO (small bowel obstruction)    Length of Stay: 4 days    General Precautions: Standard, fall  Orthopedic Precautions: None  Brace: None    Assessment:     Jenni Prescott Jr. is a 62 y.o. male admitted to [facility] on 1/9/2023 for ex-lap 2* SBO (small bowel obstruction). Jenni Prescott Jr. tolerated evaluation well today. He was resting in bed with spouse present upon PT/OT entry to room, he had just returned to bed after ambulating but agreeable to session. Reports 1/10 upper abdominal pain at rest and with activity. Demonstrates bed mobility and transfers with independence. Ambulates 300 ft in hallways independently without device; gait is steady, no losses of balance, able to hold conversation while walking, no increased pain. Educated patient on ambulating hourly when awake to promote increased activity post-op, verbalized understanding. Discussed PT role, continued mobility and recommendations (Home with family, no DME needs) with patient; verbalized understanding. At this time, Jenni Prescott Jr. has no further acute PT needs, will now d/c from acute PT services.    Problem List: pain    Plan:     Discharge from acute PT services.    Plan of Care reviewed with: patient, spouse    Subjective:     Communicated with RN prior to evaluation, appropriate to see for evaluation.    Pt found supine in bed (HOB elevated) upon PT entry  "to room, agreeable to evaluation.    Patient commenting: "I just got back from walking"    Does this patient have any cultural, spiritual, Voodoo conflicts given the current situation? Patient has no barriers to learning. Patient verbalizes understanding of his/her program and goals and demonstrates them correctly. No cultural, spiritual, or educational needs identified.    Past Medical History:   Diagnosis Date    GERD (gastroesophageal reflux disease)     Taking Protonix    Hyperlipidemia     Hypertension     Neuropathic pain of right thigh     History     Past Surgical History:   Procedure Laterality Date    ANASTOMOSIS OF ILEUM TO RECTUM  1/9/2023    Procedure: ANASTOMOSIS, ILEORECTAL;  Surgeon: Garrick Hennessy MD;  Location: [facility] OR [address] [place] [address];  Service: General;;    COLONOSCOPY N/A 1/8/2018    Procedure: COLONOSCOPY;  Surgeon: Carson Yañez Jr., MD;  Location: [facility];  Service: Endoscopy;  Laterality: N/A;    COLONOSCOPY N/A 2/19/2018    Procedure: COLONOSCOPY/Polypectomy;  Surgeon: Rex Blanco MD;  Location: [facility];  Service: Endoscopy;  Laterality: N/A;    COLONOSCOPY N/A 5/21/2018    Procedure: COLONOSCOPY/Miralax Split;  Surgeon: Malvin Lozano MD;  Location: [facility];  Service: Endoscopy;  Laterality: N/A;    COLONOSCOPY N/A 6/11/2018    Procedure: COLONOSCOPY;  Surgeon: Papa Lopez MD;  Location: [facility] (4TH FLR);  Service: Endoscopy;  Laterality: N/A;  Miralax prep-ok per Dr Lopez    FLEXIBLE SIGMOIDOSCOPY N/A 3/11/2019    Procedure: SIGMOIDOSCOPY, FLEXIBLE;  Surgeon: Wilfred Rahman MD;  Location: [facility] (4TH FLR);  Service: Endoscopy;  Laterality: N/A;    INSERTION OF CATHETER Left 9/5/2018    Procedure: Insertion-Catheter;  Surgeon: Mohan Ventura MD;  Location: [facility] OR 2ND [facility];  Service: Urology;  Laterality: Left;    KNEE SURGERY Bilateral     states he has screws    KNEE SURGERY      3 reconstructive surgeries 20yrs go     LAPAROSCOPIC TOTAL COLECTOMY " Left 9/5/2018    Procedure: COLECTOMY, TOTAL, LAPAROSCOPIC - lap total;  Surgeon: Papa Lopez MD;  Location: [facility] OR 2ND FLR;  Service: Colon and Rectal;  Laterality: Left;  preop center  DIFFICULT INTUBATION GLIDESCOPE USED    LAPAROTOMY, EXPLORATORY N/A 1/9/2023    Procedure: LAPAROTOMY, EXPLORATORY, revision of ileorectal anastomosis;  Surgeon: Garrick Hennessy MD;  Location: [facility] OR 2ND FLR;  Service: General;  Laterality: N/A;    WRIST SURGERY      2015       Living Environment:  Pt lives with his spouse in a 1  with 0 HERIBERTO; uses a tub/shower with no seat or grab bars established.    PLOF:  Prior to admission, patient was independent with activity and ADL's. Driving, working in construction.    DME:  Patient owns or has access to the following DME: None    Upon discharge, patient will have assistance from spouse.    Objective:     Patient found with: telemetry, peripheral IV    Pain:  Pain Rating 1: 1/10 at generalized upper abdomen, no significant change with activity  Pain Rating Post-Intervention 1: 1/10 (same, see above)    Cognitive Exam:  Patient is oriented to Person, Place, Time, and Situation.  Patient follows 100% of single-step commands.    Sensation:   Intact at BLE to LT    Lower Extremity Range of Motion:  Right Lower Extremity: WFL actively  Left Lower Extremity: WFL actively    Lower Extremity Strength:  Right Lower Extremity: WFL  Left Lower Extremity: WFL    Functional Mobility:    Bed Mobility:  Supine to Sitting: mod (I) with HOB elevated ~45 deg (plans to use recliner at home)  Sitting to Supine: mod (I) with HOB elevated ~45 deg (plans to use recliner at home)    Transfers:  Sit to Stand: Independent from EOB with no AD x 1 trial(s)    Gait:  300 feet in hallways independently without device; gait is steady, no losses of balance, able to hold conversation while walking, no increased pain    Assist level: Independent  Device: no AD    Balance:  Static Sit: Independent at  EOB    Static Stand: Independent with no AD    Additional Therapeutic Activity/Exercises:     1. He was resting in bed with spouse present upon PT/OT entry to room, he had just returned to bed after ambulating but agreeable to session. Reports 1/10 upper abdominal pain at rest and with activity.    2. Demonstrates bed mobility and transfers with independence. Ambulates 300 ft in hallways independently without device; gait is steady, no losses of balance, able to hold conversation while walking, no increased pain.    3. Educated patient on ambulating hourly when awake to promote increased activity post-op, verbalized understanding.    4. Discussed PT role, continued mobility and recommendations (Home with family, no DME needs) with patient; verbalized understanding.    AM-PAC 6 CLICK MOBILITY  Turning over in bed (including adjusting bedclothes, sheets and blankets)?: 4  Sitting down on and standing up from a chair with arms (e.g., wheelchair, bedside commode, etc.): 4  Moving from lying on back to sitting on the side of the bed?: 4  Moving to and from a bed to a chair (including a wheelchair)?: 4  Need to walk in hospital room?: 4  Climbing 3-5 steps with a railing?: 4  Basic Mobility Total Score: 24    Patient was left supine in bed (HOB elevated) with all lines intact, call button in reach, and spouse present.    Clinical Decision Making for Evaluation Complexity:  1. Body System(s) Examination: 1-2  2. Clinical Presentation: Evolving  3. Evaluation Complexity: Low    GOALS:   Multidisciplinary Problems       Physical Therapy Goals          Problem: Physical Therapy    Goal Priority Disciplines Outcome Goal Variances Interventions   Physical Therapy Goal     PT, PT/OT      Description: Pt has no acute PT needs, thus no goals created.                     Amrit Rea, PT  1/13/2023

## 2023-01-13 NOTE — TELEPHONE ENCOUNTER
Spoke with patient's wife, who states that the patient is having severe abdominal pain since Saturday and has been unable to eat or drink anything since then. I informed the patient's wife that the best thing would be to have him evaluated in the ED. She verbalized understanding.   Libtayo Counseling- I discussed with the patient the risks of Libtayo including but not limited to nausea, vomiting, diarrhea, and bone or muscle pain.  The patient verbalized understanding of the proper use and possible adverse effects of Libtayo.  All of the patient's questions and concerns were addressed.

## 2023-01-13 NOTE — ASSESSMENT & PLAN NOTE
63 yo male with concern for mechanical obstruction and volvulus of the small bowel about a previous ileorectal anastomosis. Given the failure of prior conservative management and worsening imaging findings with persistent symptoms, we will proceed with exploratory laparotomy and lysis of adhesions. Indications for surgery, including risks and benefits discussed with  Genie, and he would like to proceed. Patient had procedure: exlap with revison of iliorectal anastomosis (1-9-23).     - Pain multimodal regimen  - DVT prophylaxis Lovenox   - PT/OT/ambulate   - IS  - Nausea PRN  - Labs daily  - CLD but informed to take it easy  - Home meds restarted

## 2023-01-13 NOTE — NURSING
Patient refusing NGT insertion. General surgery resident notified, stated will see patient after clinic.

## 2023-01-13 NOTE — SUBJECTIVE & OBJECTIVE
Interval History: NAEON. AF. VSS. Pain is well controlled. Had has several bowel movements. Reports he gets nausea after taking sips of water, was told to hold back on PO intake for now. KUB yesterday consistent with ileus that seem to be improving now. Hasn't been walking much. CRP decreased    Medications:  Continuous Infusions:   lactated ringers 75 mL/hr at 01/13/23 0326     Scheduled Meds:   acetaminophen  650 mg Oral Q6H    atorvastatin  20 mg Oral Daily    enoxaparin  40 mg Subcutaneous Daily    famotidine (PF)  20 mg Intravenous BID    hydroCHLOROthiazide  12.5 mg Oral Daily    ibuprofen  400 mg Oral Q6H    lisinopriL  20 mg Oral Daily    methocarbamoL  500 mg Oral QID     PRN Meds:hydrALAZINE, HYDROmorphone, HYDROmorphone, HYDROmorphone, labetalol, melatonin, naloxone, ondansetron, prochlorperazine, simethicone, sodium chloride 0.9%     Review of patient's allergies indicates:   Allergen Reactions    Codeine      Itching with Codeine , Oxycodone     Oxycodone      Itching      Objective:     Vital Signs (Most Recent):  Temp: 96.1 °F (35.6 °C) (01/13/23 0813)  Pulse: 98 (01/13/23 0813)  Resp: 17 (01/13/23 0813)  BP: 131/86 (01/13/23 0813)  SpO2: 95 % (01/13/23 0813) Vital Signs (24h Range):  Temp:  [96.1 °F (35.6 °C)-99.3 °F (37.4 °C)] 96.1 °F (35.6 °C)  Pulse:  [] 98  Resp:  [17-20] 17  SpO2:  [94 %-95 %] 95 %  BP: (131-200)/() 131/86     Weight: 83.9 kg (185 lb)  Body mass index is 26.54 kg/m².    Intake/Output - Last 3 Shifts         01/11 0700  01/12 0659 01/12 0700  01/13 0659 01/13 0700 01/14 0659    P.O.       Total Intake(mL/kg)       Urine (mL/kg/hr)       Emesis/NG output       Stool       Total Output       Net              Stool Occurrence  1 x             Physical Exam  Vitals reviewed.   Constitutional:       Appearance: Normal appearance.   Cardiovascular:      Rate and Rhythm: Normal rate.      Pulses: Normal pulses.   Pulmonary:      Effort: Pulmonary effort is normal.    Abdominal:      Palpations: Abdomen is soft.      Tenderness: There is no abdominal tenderness.      Comments: Slight distension stable. Dresings CDI. Sutures intact.    Skin:     General: Skin is warm and dry.   Neurological:      General: No focal deficit present.      Mental Status: He is alert and oriented to person, place, and time.       Significant Labs:  I have reviewed all pertinent lab results within the past 24 hours.  CBC:   Recent Labs   Lab 01/13/23  0539   WBC 5.00   RBC 4.99   HGB 14.6   HCT 43.8      MCV 88   MCH 29.3   MCHC 33.3     CMP:   Recent Labs   Lab 01/13/23  0539   *   CALCIUM 8.8   ALBUMIN 2.9*   PROT 6.0   *   K 3.3*   CO2 28   CL 96   BUN 11   CREATININE 0.6   ALKPHOS 63   ALT 22   AST 15   BILITOT 3.8*     Coagulation: No results for input(s): LABPROT, INR, APTT in the last 168 hours.    Significant Diagnostics:  I have reviewed all pertinent imaging results/findings within the past 24 hours.  I have reviewed and interpreted all pertinent imaging results/findings within the past 24 hours.

## 2023-01-13 NOTE — PROGRESS NOTES
[facility] - [facility]  General Surgery  Progress Note    Subjective:     History of Present Illness:  Mr. Prescott is a 62 yoM with PMH including total colectomy with ileorectal anastomosis for polyposis in 2018 who presented to the ED on 12/30 with 1 week of worsening bloating, abdominal pain,and nausea/vomiting. Workup in the ED revealed a CT scan significant for potential transition point of the ileum in the RLQ with ileal distension and decompression of the proximal small bowel and stomach. He was admitted and passed a GGC challenge and was discharged on 12/31.    He re presents today with continued bloating, abdominal pain, nausea, and emesis. Repeat CT scan on 1/9 demonstrated mesenteric swirling about the ileorectal anastomosis with positioning of the small bowel in the right quadrant (vs the left on 12/30). No fever, chills, shortness of breath, or chest pain.      Post-Op Info:  Procedure(s) (LRB):  LAPAROTOMY, EXPLORATORY, revision of ileorectal anastomosis (N/A)  ANASTOMOSIS, ILEORECTAL   4 Days Post-Op     Interval History: NAEON. AF. VSS. Pain is well controlled. Had has several bowel movements. Reports he gets nausea after taking sips of water, was told to hold back on PO intake for now. KUB yesterday consistent with ileus that seem to be improving now. Hasn't been walking much. CRP decreased    Medications:  Continuous Infusions:   lactated ringers 75 mL/hr at 01/13/23 0326     Scheduled Meds:   acetaminophen  650 mg Oral Q6H    atorvastatin  20 mg Oral Daily    enoxaparin  40 mg Subcutaneous Daily    famotidine (PF)  20 mg Intravenous BID    hydroCHLOROthiazide  12.5 mg Oral Daily    ibuprofen  400 mg Oral Q6H    lisinopriL  20 mg Oral Daily    methocarbamoL  500 mg Oral QID     PRN Meds:hydrALAZINE, HYDROmorphone, HYDROmorphone, HYDROmorphone, labetalol, melatonin, naloxone, ondansetron, prochlorperazine, simethicone, sodium chloride 0.9%     Review of patient's allergies indicates:   Allergen  Reactions    Codeine      Itching with Codeine , Oxycodone     Oxycodone      Itching      Objective:     Vital Signs (Most Recent):  Temp: 96.1 °F (35.6 °C) (01/13/23 0813)  Pulse: 98 (01/13/23 0813)  Resp: 17 (01/13/23 0813)  BP: 131/86 (01/13/23 0813)  SpO2: 95 % (01/13/23 0813) Vital Signs (24h Range):  Temp:  [96.1 °F (35.6 °C)-99.3 °F (37.4 °C)] 96.1 °F (35.6 °C)  Pulse:  [] 98  Resp:  [17-20] 17  SpO2:  [94 %-95 %] 95 %  BP: (131-200)/() 131/86     Weight: 83.9 kg (185 lb)  Body mass index is 26.54 kg/m².    Intake/Output - Last 3 Shifts         01/11 0700  01/12 0659 01/12 0700 01/13 0659 01/13 0700 01/14 0659    P.O.       Total Intake(mL/kg)       Urine (mL/kg/hr)       Emesis/NG output       Stool       Total Output       Net              Stool Occurrence  1 x             Physical Exam  Vitals reviewed.   Constitutional:       Appearance: Normal appearance.   Cardiovascular:      Rate and Rhythm: Normal rate.      Pulses: Normal pulses.   Pulmonary:      Effort: Pulmonary effort is normal.   Abdominal:      Palpations: Abdomen is soft.      Tenderness: There is no abdominal tenderness.      Comments: Slight distension stable. Dresings CDI. Sutures intact.    Skin:     General: Skin is warm and dry.   Neurological:      General: No focal deficit present.      Mental Status: He is alert and oriented to person, place, and time.       Significant Labs:  I have reviewed all pertinent lab results within the past 24 hours.  CBC:   Recent Labs   Lab 01/13/23  0539   WBC 5.00   RBC 4.99   HGB 14.6   HCT 43.8      MCV 88   MCH 29.3   MCHC 33.3     CMP:   Recent Labs   Lab 01/13/23  0539   *   CALCIUM 8.8   ALBUMIN 2.9*   PROT 6.0   *   K 3.3*   CO2 28   CL 96   BUN 11   CREATININE 0.6   ALKPHOS 63   ALT 22   AST 15   BILITOT 3.8*     Coagulation: No results for input(s): LABPROT, INR, APTT in the last 168 hours.    Significant Diagnostics:  I have reviewed all pertinent imaging  results/findings within the past 24 hours.  I have reviewed and interpreted all pertinent imaging results/findings within the past 24 hours.    Assessment/Plan:     * SBO (small bowel obstruction)  61 yo male with concern for mechanical obstruction and volvulus of the small bowel about a previous ileorectal anastomosis. Given the failure of prior conservative management and worsening imaging findings with persistent symptoms, we will proceed with exploratory laparotomy and lysis of adhesions. Indications for surgery, including risks and benefits discussed with Mr. Prescott, and he would like to proceed. Patient had procedure: exlap with revison of iliorectal anastomosis (1-9-23).     - Pain multimodal regimen  - DVT prophylaxis Lovenox   - PT/OT/ambulate   - IS  - Nausea PRN  - Labs daily  - CLD but informed to take it easy  - Home meds restarted    Hypokalemia  - Replete PRN  - Continue to monitor with daily CMP        Khoa Lopez MD  General Surgery  [facility] - [facility]

## 2023-01-13 NOTE — PLAN OF CARE
Jenni Prescott Jr. is a 62 y.o. male admitted to [facility] on 1/9/2023 for ex-lap 2* SBO (small bowel obstruction). Jenni Prescott Jr. tolerated evaluation well today. He was resting in bed with spouse present upon PT/OT entry to room, he had just returned to bed after ambulating but agreeable to session. Reports 1/10 upper abdominal pain at rest and with activity. Demonstrates bed mobility and transfers with independence. Ambulates 300 ft in hallways independently without device; gait is steady, no losses of balance, able to hold conversation while walking, no increased pain. Educated patient on ambulating hourly when awake to promote increased activity post-op, verbalized understanding. Discussed PT role, continued mobility and recommendations (Home with family, no DME needs) with patient; verbalized understanding. At this time, Jenni Prescott Jr. has no further acute PT needs, will now d/c from acute PT services.    Problem: Physical Therapy  Goal: Physical Therapy Goal  Description: Pt has no acute PT needs, thus no goals created.  Outcome: Met    Amrit Rea, PT  1/13/2023

## 2023-01-14 VITALS
TEMPERATURE: 99 F | WEIGHT: 185 LBS | DIASTOLIC BLOOD PRESSURE: 92 MMHG | SYSTOLIC BLOOD PRESSURE: 158 MMHG | BODY MASS INDEX: 26.48 KG/M2 | OXYGEN SATURATION: 94 % | HEART RATE: 72 BPM | RESPIRATION RATE: 19 BRPM | HEIGHT: 70 IN

## 2023-01-14 PROBLEM — E87.6 HYPOKALEMIA: Status: RESOLVED | Noted: 2023-01-10 | Resolved: 2023-01-14

## 2023-01-14 PROBLEM — K56.609 SBO (SMALL BOWEL OBSTRUCTION): Status: RESOLVED | Noted: 2022-12-30 | Resolved: 2023-01-14

## 2023-01-14 LAB
ALBUMIN SERPL BCP-MCNC: 2.9 G/DL (ref 3.5–5.2)
ALP SERPL-CCNC: 72 U/L (ref 55–135)
ALT SERPL W/O P-5'-P-CCNC: 35 U/L (ref 10–44)
ANION GAP SERPL CALC-SCNC: 8 MMOL/L (ref 8–16)
AST SERPL-CCNC: 28 U/L (ref 10–40)
BASOPHILS # BLD AUTO: 0.04 K/UL (ref 0–0.2)
BASOPHILS NFR BLD: 0.9 % (ref 0–1.9)
BILIRUB SERPL-MCNC: 3.3 MG/DL (ref 0.1–1)
BUN SERPL-MCNC: 12 MG/DL (ref 6–20)
CALCIUM SERPL-MCNC: 8.9 MG/DL (ref 8.7–10.5)
CHLORIDE SERPL-SCNC: 100 MMOL/L (ref 95–110)
CO2 SERPL-SCNC: 27 MMOL/L (ref 23–29)
CREAT SERPL-MCNC: 0.7 MG/DL (ref 0.5–1.4)
CRP SERPL-MCNC: 72.4 MG/L (ref 0–8.2)
DIFFERENTIAL METHOD: ABNORMAL
EOSINOPHIL # BLD AUTO: 0.2 K/UL (ref 0–0.5)
EOSINOPHIL NFR BLD: 4.8 % (ref 0–8)
ERYTHROCYTE [DISTWIDTH] IN BLOOD BY AUTOMATED COUNT: 13.4 % (ref 11.5–14.5)
EST. GFR  (NO RACE VARIABLE): >60 ML/MIN/1.73 M^2
GLUCOSE SERPL-MCNC: 93 MG/DL (ref 70–110)
HCT VFR BLD AUTO: 41.4 % (ref 40–54)
HGB BLD-MCNC: 13.6 G/DL (ref 14–18)
IMM GRANULOCYTES # BLD AUTO: 0.01 K/UL (ref 0–0.04)
IMM GRANULOCYTES NFR BLD AUTO: 0.2 % (ref 0–0.5)
LYMPHOCYTES # BLD AUTO: 0.9 K/UL (ref 1–4.8)
LYMPHOCYTES NFR BLD: 20.3 % (ref 18–48)
MAGNESIUM SERPL-MCNC: 2 MG/DL (ref 1.6–2.6)
MCH RBC QN AUTO: 29.4 PG (ref 27–31)
MCHC RBC AUTO-ENTMCNC: 32.9 G/DL (ref 32–36)
MCV RBC AUTO: 90 FL (ref 82–98)
MONOCYTES # BLD AUTO: 0.8 K/UL (ref 0.3–1)
MONOCYTES NFR BLD: 17.5 % (ref 4–15)
NEUTROPHILS # BLD AUTO: 2.5 K/UL (ref 1.8–7.7)
NEUTROPHILS NFR BLD: 56.3 % (ref 38–73)
NRBC BLD-RTO: 0 /100 WBC
PHOSPHATE SERPL-MCNC: 2.9 MG/DL (ref 2.7–4.5)
PLATELET # BLD AUTO: 273 K/UL (ref 150–450)
PMV BLD AUTO: 9.9 FL (ref 9.2–12.9)
POTASSIUM SERPL-SCNC: 3.4 MMOL/L (ref 3.5–5.1)
PROT SERPL-MCNC: 5.8 G/DL (ref 6–8.4)
RBC # BLD AUTO: 4.62 M/UL (ref 4.6–6.2)
SODIUM SERPL-SCNC: 135 MMOL/L (ref 136–145)
WBC # BLD AUTO: 4.39 K/UL (ref 3.9–12.7)

## 2023-01-14 PROCEDURE — 25000003 PHARM REV CODE 250: Performed by: STUDENT IN AN ORGANIZED HEALTH CARE EDUCATION/TRAINING PROGRAM

## 2023-01-14 PROCEDURE — 84100 ASSAY OF PHOSPHORUS: CPT | Performed by: STUDENT IN AN ORGANIZED HEALTH CARE EDUCATION/TRAINING PROGRAM

## 2023-01-14 PROCEDURE — 80053 COMPREHEN METABOLIC PANEL: CPT | Performed by: STUDENT IN AN ORGANIZED HEALTH CARE EDUCATION/TRAINING PROGRAM

## 2023-01-14 PROCEDURE — 86140 C-REACTIVE PROTEIN: CPT | Performed by: STUDENT IN AN ORGANIZED HEALTH CARE EDUCATION/TRAINING PROGRAM

## 2023-01-14 PROCEDURE — 36415 COLL VENOUS BLD VENIPUNCTURE: CPT | Performed by: STUDENT IN AN ORGANIZED HEALTH CARE EDUCATION/TRAINING PROGRAM

## 2023-01-14 PROCEDURE — 83735 ASSAY OF MAGNESIUM: CPT | Performed by: STUDENT IN AN ORGANIZED HEALTH CARE EDUCATION/TRAINING PROGRAM

## 2023-01-14 PROCEDURE — 85025 COMPLETE CBC W/AUTO DIFF WBC: CPT | Performed by: STUDENT IN AN ORGANIZED HEALTH CARE EDUCATION/TRAINING PROGRAM

## 2023-01-14 RX ORDER — ONDANSETRON 4 MG/1
8 TABLET, ORALLY DISINTEGRATING ORAL 2 TIMES DAILY PRN
Qty: 14 TABLET | Refills: 0 | Status: SHIPPED | OUTPATIENT
Start: 2023-01-14 | End: 2024-03-26

## 2023-01-14 RX ORDER — HYDROCODONE BITARTRATE AND ACETAMINOPHEN 10; 325 MG/1; MG/1
1 TABLET ORAL EVERY 6 HOURS PRN
Qty: 20 TABLET | Refills: 0 | Status: SHIPPED | OUTPATIENT
Start: 2023-01-14 | End: 2023-01-14 | Stop reason: HOSPADM

## 2023-01-14 RX ORDER — ONDANSETRON 4 MG/1
4 TABLET, ORALLY DISINTEGRATING ORAL 2 TIMES DAILY
Qty: 14 TABLET | Refills: 0 | Status: SHIPPED | OUTPATIENT
Start: 2023-01-14 | End: 2023-01-14 | Stop reason: HOSPADM

## 2023-01-14 RX ORDER — LISINOPRIL AND HYDROCHLOROTHIAZIDE 12.5; 2 MG/1; MG/1
1 TABLET ORAL DAILY
Qty: 30 TABLET | Refills: 11 | Status: SHIPPED | OUTPATIENT
Start: 2023-01-14

## 2023-01-14 RX ORDER — PANTOPRAZOLE SODIUM 40 MG/1
40 TABLET, DELAYED RELEASE ORAL DAILY
Qty: 30 TABLET | Refills: 11 | Status: SHIPPED | OUTPATIENT
Start: 2023-01-14 | End: 2024-03-04

## 2023-01-14 RX ORDER — HYDROMORPHONE HYDROCHLORIDE 2 MG/1
2 TABLET ORAL
Qty: 10 TABLET | Refills: 0 | Status: SHIPPED | OUTPATIENT
Start: 2023-01-14 | End: 2023-01-14 | Stop reason: HOSPADM

## 2023-01-14 RX ORDER — HYDROMORPHONE HYDROCHLORIDE 2 MG/1
2 TABLET ORAL EVERY 4 HOURS PRN
Qty: 20 TABLET | Refills: 0 | Status: SHIPPED | OUTPATIENT
Start: 2023-01-14 | End: 2024-03-26

## 2023-01-14 RX ADMIN — HYDROCHLOROTHIAZIDE 12.5 MG: 12.5 TABLET ORAL at 08:01

## 2023-01-14 RX ADMIN — METHOCARBAMOL 500 MG: 500 TABLET ORAL at 08:01

## 2023-01-14 RX ADMIN — IBUPROFEN 400 MG: 400 TABLET ORAL at 11:01

## 2023-01-14 RX ADMIN — HYDROMORPHONE HYDROCHLORIDE 2 MG: 2 TABLET ORAL at 04:01

## 2023-01-14 RX ADMIN — ACETAMINOPHEN 650 MG: 325 TABLET ORAL at 11:01

## 2023-01-14 RX ADMIN — METHOCARBAMOL 500 MG: 500 TABLET ORAL at 12:01

## 2023-01-14 RX ADMIN — LISINOPRIL 20 MG: 20 TABLET ORAL at 08:01

## 2023-01-14 RX ADMIN — IBUPROFEN 400 MG: 400 TABLET ORAL at 05:01

## 2023-01-14 RX ADMIN — ATORVASTATIN CALCIUM 20 MG: 20 TABLET, FILM COATED ORAL at 08:01

## 2023-01-14 RX ADMIN — ACETAMINOPHEN 650 MG: 325 TABLET ORAL at 05:01

## 2023-01-14 NOTE — DISCHARGE SUMMARY
[facility]  General Surgery  Discharge Summary      Patient Name: Jenni Prescott Jr.  MRN: [id number]  Admission Date: 1/9/2023  Hospital Length of Stay: 5 days  Discharge Date and Time:  01/14/2023 8:11 AM  Attending Physician: Garrick Hennessy MD   Discharging Provider: Khoa Lopez MD  Primary Care Provider: Radha Antoine MD    HPI:   Mr. Prescott is a 62 yoM with PMH including total colectomy with ileorectal anastomosis for polyposis in 2018 who presented to the ED on 12/30 with 1 week of worsening bloating, abdominal pain,and nausea/vomiting. Workup in the ED revealed a CT scan significant for potential transition point of the ileum in the RLQ with ileal distension and decompression of the proximal small bowel and stomach. He was admitted and passed a GGC challenge and was discharged on 12/31.    He re presents today with continued bloating, abdominal pain, nausea, and emesis. Repeat CT scan on 1/9 demonstrated mesenteric swirling about the ileorectal anastomosis with positioning of the small bowel in the right quadrant (vs the left on 12/30). No fever, chills, shortness of breath, or chest pain.      Procedure(s) (LRB):  LAPAROTOMY, EXPLORATORY, revision of ileorectal anastomosis (N/A)  ANASTOMOSIS, ILEORECTAL      Indwelling Lines/Drains at time of discharge:   Lines/Drains/Airways     None               Hospital Course: Please see the preoperative H&P and other available documentation for full details related to history prior to this admission.  Briefly, Jenni Prescott Jr. is a 62 y.o. male who was admitted following scheduled elective surgery for SBO (small bowel obstruction). They underwent the following procedures: exploratory laparotomy and revision of iliorectal anastomosis on 1/9/23    Following a complete preoperative discussion of the risks and benefits of surgery with signed informed consent, the patient was taken to the operating room on 1/9/2023 and underwent the above  stated procedures. The patient tolerated surgery well and there were no complications. Please see the operative report for full intraoperative findings and details. Postoperatively, the patient did well and was transferred from the PACU to the floor in stable condition where they had a stable and uncomplicated hospital course. Of note patient did have some nausea and food intolerance so diet was progressed slowly. Labs and vital signs remained stable and appropriate throughout course. Diet was advanced as tolerated and the patient's pain was controlled on oral pain medications without problem. Ambulating without issue. Voiding without issue with adequate urine output. Passing gas and stool. Incision site is clean, dry, and intact.    Currently, the patient is doing well at 5 Days Post-Op and is stable and appropriate for discharge home at this time. Patient will follow up in clinic with Dr. Hennessy in 1-2 weeks.        Goals of Care Treatment Preferences:  Code Status: Full Code      Consults:   Consults (From admission, onward)        Status Ordering Provider     Inpatient consult to General surgery  Once        Provider:  (Not yet assigned)    ARLENE Gentile          Significant Diagnostic Studies: Labs:   CMP   Recent Labs   Lab 01/13/23  0539 01/14/23  0605   * 135*   K 3.3* 3.4*   CL 96 100   CO2 28 27   * 93   BUN 11 12   CREATININE 0.6 0.7   CALCIUM 8.8 8.9   PROT 6.0 5.8*   ALBUMIN 2.9* 2.9*   BILITOT 3.8* 3.3*   ALKPHOS 63 72   AST 15 28   ALT 22 35   ANIONGAP 10 8   , CBC   Recent Labs   Lab 01/13/23  0539 01/14/23  0605   WBC 5.00 4.39   HGB 14.6 13.6*   HCT 43.8 41.4    273   , INR   Lab Results   Component Value Date    INR 1.0 04/21/2015   , Lipid Panel   Lab Results   Component Value Date    CHOL 95 (L) 10/22/2018    HDL 40 10/22/2018    LDLCALC 32.4 (L) 10/22/2018    TRIG 113 10/22/2018    CHOLHDL 42.1 10/22/2018   , Troponin No results for input(s): TROPONINI in the last  168 hours., A1C: No results for input(s): HGBA1C in the last 4320 hours. and All labs within the past 24 hours have been reviewed  Radiology: X-Ray: CXR: X-Ray Chest 1 View (CXR): No results found for this visit on 01/09/23. and X-Ray Chest PA and Lateral (CXR): No results found for this visit on 01/09/23. and KUB: X-Ray Abdomen AP 1 View (KUB):   Results for orders placed or performed during the hospital encounter of 01/09/23   X-Ray Abdomen AP 1 View    Narrative    EXAMINATION:  XR ABDOMEN AP 1 VIEW    CLINICAL HISTORY:  abdominal pain;    TECHNIQUE:  AP View(s) of the abdomen was performed.    COMPARISON:  January 12, 2023    FINDINGS:  Skin staples noted.  Multiple air dilated loops of bowel predominantly small bowel measuring up to 6.4 cm.  Patient had colon resection by history.  Bones appear intact.      Impression    Multiple air dilated loops of small bowel measuring up to 6.4 cm.  Partial bowel obstruction not excluded.    This report was flagged in Epic as abnormal.      Electronically signed by: August Johnson MD  Date:    01/13/2023  Time:    10:29     CT scan: CT ABDOMEN PELVIS WITH CONTRAST:   Results for orders placed or performed during the hospital encounter of 01/09/23   CT Abdomen Pelvis With Contrast    Narrative    EXAMINATION:  CT ABDOMEN PELVIS WITH CONTRAST    CLINICAL HISTORY:  Abdominal pain, acute, nonlocalized;    TECHNIQUE:  Low dose axial images, sagittal and coronal reformations were obtained from the lung bases to the pubic symphysis following the IV administration of 100 mL of Omnipaque 350 .  Oral contrast was not given.    COMPARISON:  12/30/2022    FINDINGS:  Images of the lower thorax are remarkable for minimal dependent atelectasis.    In the liver is hypoattenuating, may reflect steatosis, correlation with LFTs recommended.  There is a low attenuating lesion within the anterior aspect of the spleen measuring 2.8 cm, attenuation of which suggests possible cyst.  The stomach is  decompressed without wall thickening.  The pancreas, gallbladder and adrenal glands are grossly unremarkable.  There is no biliary dilation.  The portal vein, splenic vein, SMV, celiac axis and SMA all are patent.  No significant abdominal lymphadenopathy.  There is fluid anterior to the liver.    The kidneys enhance symmetrically without hydronephrosis.  There is right nonobstructive nephrolithiasis, no left nephrolithiasis.  There is left perinephric fat stranding.  There are subcentimeter low attenuating lesions arising from the interpolar region of the left kidney, too small for characterization.  The bilateral ureters are unremarkable without calculi seen.  The urinary bladder is relatively decompressed noting there is some residual wall thickening.  The prostate is enlarged.    There is surgical change of total colectomy and ileal rectal anastomosis..  In comparison to examination 12/30/2022, there is been development of mesenteric swirling about the anastomotic site, now with the anastomosis and distal ileal segments within the right lower quadrant, translocated from the left.  There is diffuse dilation of the small bowel from this location upstream.  There are segments of decompressed proximal to mid small bowel.  No findings to suggest pneumatosis or free air.  There are a few scattered shotty periaortic, pericaval, and mesenteric lymph nodes.  There is atherosclerotic calcification of the aorta and its branches.  No focal organized pelvic fluid collection.  There is a small amount of fluid in the deep pelvis.    There is osteopenia.  There are remote fractures of the right inferior and superior pubic rami.  There are degenerative changes of the spine and sacroiliac joints.  There is remote appearing injury of left rib 6.  No significant inguinal lymphadenopathy.      Impression    This report was flagged in Epic as abnormal.    1. Interval mesenteric swirling about the ileal rectal anastomosis, with  positioning of the distal small bowel within the right quadrant, previously within the left.  Findings are concerning for adhesion resulting in tethering of the bowel in the region/volvulus.  This results in small-bowel obstruction, developing high-grade obstruction not excluded given caliber change.  Correlation is advised.  No pneumatosis or free air at this time noting reactive abdominopelvic ascites.  2. Urinary bladder wall thickening, may be on the basis of chronic outlet obstruction in this patient with prostatomegaly however correlation with urinalysis is recommended as cystitis can present in this fashion.  3. Findings suggesting hepatic steatosis, correlation with LFTs recommended.  4. Please see above for several additional findings.      Electronically signed by: Andre Dockery MD  Date:    01/09/2023  Time:    16:18    and CT ABDOMEN PELVIS WITHOUT CONTRAST: No results found for this visit on 01/09/23.    Pending Diagnostic Studies:     Procedure Component Value Units Date/Time    Specimen to Pathology, Surgery General Surgery [[id number]] Collected: 01/09/23 2023    Order Status: Sent Lab Status: In process Updated: 01/10/23 1432    Specimen: Tissue         Final Active Diagnoses:    Diagnosis Date Noted POA    PRINCIPAL PROBLEM:  SBO (small bowel obstruction) [K56.609] 12/30/2022 Yes    Hypokalemia [E87.6] 01/10/2023 No      Problems Resolved During this Admission:      Discharged Condition: good    Disposition: Home or Self Care    Follow Up:   Follow-up Information     Garrick Hennessy MD. Schedule an appointment as soon as possible for a visit in 1 week(s).    Specialty: General Surgery  Why: Call to make appointment in 1-2 weeks to remove staple and follow  up on progress  Contact information:  [id number]  [facility] [address]  [phone]                       Patient Instructions:      Lifting restrictions   Order Comments: No heavy lifting greater than 10 pounds (about the weight of  "a gallon of milk) for 6 week postoperatively. This is to prevent new/recurrent hernia at your incision sites while they heal.     No driving until:   Order Comments: While taking narcotic pain medications.     Notify your health care provider if you experience any of the following:  temperature >100.4     Notify your health care provider if you experience any of the following:  persistent nausea and vomiting or diarrhea     Notify your health care provider if you experience any of the following:  severe uncontrolled pain     Notify your health care provider if you experience any of the following:  redness, tenderness, or signs of infection (pain, swelling, redness, odor or green/yellow discharge around incision site)     Notify your health care provider if you experience any of the following:  difficulty breathing or increased cough     Notify your health care provider if you experience any of the following:  severe persistent headache     Notify your health care provider if you experience any of the following:  worsening rash     Notify your health care provider if you experience any of the following:  persistent dizziness, light-headedness, or visual disturbances     Notify your health care provider if you experience any of the following:  increased confusion or weakness     Shower on day dressing removed (No bath)   Order Comments: You may SHOWER 48 hours after surgery. If you have gauze/tape dressings in place, you should remove them prior to showering. If you have Dermabond (skin glue) or white "Steri strips" in place, these will eventually peel off on their own after 1-2 weeks. NO SUBMERSION of your incisions (bath, pool, hot tub, etc) until your postop appointment. This allows your incisions to heal and to avoid a wound infection.     Medications:  Reconciled Home Medications:      Medication List      START taking these medications    HYDROmorphone 2 MG tablet  Commonly known as: DILAUDID  Take 1 tablet (2 " mg total) by mouth every 3 (three) hours as needed for Pain.     ondansetron 4 MG Tbdl  Commonly known as: ZOFRAN-ODT  Take 1 tablet (4 mg total) by mouth 2 (two) times daily.        CONTINUE taking these medications    atorvastatin 20 MG tablet  Commonly known as: LIPITOR  Take 1 tablet (20 mg total) by mouth once daily.        ASK your doctor about these medications    lisinopriL-hydrochlorothiazide 20-12.5 mg per tablet  Commonly known as: PRINZIDE,ZESTORETIC  Take 1 tablet by mouth once daily.     pantoprazole 40 MG tablet  Commonly known as: PROTONIX  TAKE 1 TABLET(40 MG) BY MOUTH EVERY DAY          Time spent on the discharge of patient: 20 minutes    Khoa Lopez MD  General Surgery  [facility] - Surgery

## 2023-01-14 NOTE — DISCHARGE INSTRUCTIONS
Surgery Post Op Instructions:    You had a exploratory laparotomy with revision of iliorectal anastomosis performed 1/9/23.     Wound care:  Your incision is covered with Dermabond, a type of surgical super glue. You may shower tomorrow - let soapy water run over the incision but do not scrub the area. You must avoid submerging the incision in pools, bathtubs, etc until it is fully healed in 4-6 weeks.     You need to limit yourself to light duty activities (no lifting/pulling/pushing >10 lbs) for a total of 6 weeks after surgery.    No dietary restrictions.    Medications:  A narcotic pain medication has been prescribed.  Please start to wean the pain medication over the following few days.  You can take tylenol instead of the pain medication.      Please take miralax 1 capful at night to prevent constipation associated with narcotic pain medications.      Follow up:  Return to clinic in 1-2 weeks for follow up and wound check.

## 2023-01-14 NOTE — PROGRESS NOTES
AVS provided and education completed on f/u care and f/u appointment with pt and wife at bedside. Questions answered. PIV removed. Belongings sent with pt. Medications delivered from pharmacy and sent with pt. Pt assisted off unit via wheelchair.

## 2023-01-14 NOTE — HOSPITAL COURSE
Please see the preoperative H&P and other available documentation for full details related to history prior to this admission.  Briefly, Jenni Prescott Jr. is a 62 y.o. male who was admitted following scheduled elective surgery for SBO (small bowel obstruction). They underwent the following procedures: exploratory laparotomy and revision of iliorectal anastomosis on 1/9/23    Following a complete preoperative discussion of the risks and benefits of surgery with signed informed consent, the patient was taken to the operating room on 1/9/2023 and underwent the above stated procedures. The patient tolerated surgery well and there were no complications. Please see the operative report for full intraoperative findings and details. Postoperatively, the patient did well and was transferred from the PACU to the floor in stable condition where they had a stable and uncomplicated hospital course. Of note patient did have some nausea and food intolerance so diet was progressed slowly. Labs and vital signs remained stable and appropriate throughout course. Diet was advanced as tolerated and the patient's pain was controlled on oral pain medications without problem. Ambulating without issue. Voiding without issue with adequate urine output. Passing gas and stool. Incision site is clean, dry, and intact.    Currently, the patient is doing well at 5 Days Post-Op and is stable and appropriate for discharge home at this time. Patient will follow up in clinic with Dr. Hennessy in 1-2 weeks.

## 2023-01-14 NOTE — SUBJECTIVE & OBJECTIVE
Interval History: NAEON. AF. VSS. Pain is well controlled. Passing gas and having bowel movements. Nausea much improved form yesterday, still with decreased appetite. Voiding well  Medications:  Continuous Infusions:   lactated ringers 75 mL/hr at 01/13/23 1349     Scheduled Meds:   acetaminophen  650 mg Oral Q6H    atorvastatin  20 mg Oral Daily    enoxaparin  40 mg Subcutaneous Daily    famotidine (PF)  20 mg Intravenous BID    hydroCHLOROthiazide  12.5 mg Oral Daily    ibuprofen  400 mg Oral Q6H    lisinopriL  20 mg Oral Daily    methocarbamoL  500 mg Oral QID     PRN Meds:hydrALAZINE, HYDROmorphone, HYDROmorphone, HYDROmorphone, iohexol, labetalol, melatonin, naloxone, ondansetron, prochlorperazine, simethicone, sodium chloride 0.9%     Review of patient's allergies indicates:   Allergen Reactions    Codeine      Itching with Codeine , Oxycodone     Oxycodone      Itching      Objective:     Vital Signs (Most Recent):  Temp: 98.2 °F (36.8 °C) (01/14/23 0515)  Pulse: 62 (01/14/23 0654)  Resp: 18 (01/14/23 0515)  BP: (!) 167/94 (01/14/23 0515)  SpO2: (!) 93 % (01/14/23 0515) Vital Signs (24h Range):  Temp:  [96.1 °F (35.6 °C)-99.1 °F (37.3 °C)] 98.2 °F (36.8 °C)  Pulse:  [62-98] 62  Resp:  [16-18] 18  SpO2:  [93 %-96 %] 93 %  BP: (131-167)/(82-94) 167/94     Weight: 83.9 kg (185 lb)  Body mass index is 26.54 kg/m².    Intake/Output - Last 3 Shifts         01/12 0700  01/13 0659 01/13 0700  01/14 0659 01/14 0700  01/15 0659           Stool Occurrence 1 x              Physical Exam  Vitals reviewed.   Constitutional:       Appearance: Normal appearance.   Cardiovascular:      Rate and Rhythm: Normal rate.      Pulses: Normal pulses.   Pulmonary:      Effort: Pulmonary effort is normal.   Abdominal:      Palpations: Abdomen is soft.      Tenderness: There is no abdominal tenderness.      Comments: Dresings CDI. Sutures intact.    Skin:     General: Skin is warm and dry.   Neurological:      General: No focal deficit  present.      Mental Status: He is alert and oriented to person, place, and time.       Significant Labs:  I have reviewed all pertinent lab results within the past 24 hours.  CBC:   Recent Labs   Lab 01/14/23  0605   WBC 4.39   RBC 4.62   HGB 13.6*   HCT 41.4      MCV 90   MCH 29.4   MCHC 32.9       CMP:   Recent Labs   Lab 01/14/23  0605   GLU 93   CALCIUM 8.9   ALBUMIN 2.9*   PROT 5.8*   *   K 3.4*   CO2 27      BUN 12   CREATININE 0.7   ALKPHOS 72   ALT 35   AST 28   BILITOT 3.3*       Coagulation: No results for input(s): LABPROT, INR, APTT in the last 168 hours.    Significant Diagnostics:  I have reviewed all pertinent imaging results/findings within the past 24 hours.  I have reviewed and interpreted all pertinent imaging results/findings within the past 24 hours.

## 2023-01-14 NOTE — ASSESSMENT & PLAN NOTE
63 yo male with concern for mechanical obstruction and volvulus of the small bowel about a previous ileorectal anastomosis. Given the failure of prior conservative management and worsening imaging findings with persistent symptoms, we will proceed with exploratory laparotomy and lysis of adhesions. Indications for surgery, including risks and benefits discussed with  Genie, and he would like to proceed. Patient had procedure: exlap with revison of iliorectal anastomosis (1-9-23).     - Pain multimodal regimen  - DVT prophylaxis Lovenox   - PT/OT/ambulate   - IS  - Nausea PRN  - Labs daily  -Regular diet  - Home meds restarted    Dispo: likely discharge home in the evening

## 2023-01-14 NOTE — PROGRESS NOTES
[facility]  General Surgery  Progress Note    Subjective:     History of Present Illness:  Mr. Prescott is a 62 yoM with PMH including total colectomy with ileorectal anastomosis for polyposis in 2018 who presented to the ED on 12/30 with 1 week of worsening bloating, abdominal pain,and nausea/vomiting. Workup in the ED revealed a CT scan significant for potential transition point of the ileum in the RLQ with ileal distension and decompression of the proximal small bowel and stomach. He was admitted and passed a GGC challenge and was discharged on 12/31.    He re presents today with continued bloating, abdominal pain, nausea, and emesis. Repeat CT scan on 1/9 demonstrated mesenteric swirling about the ileorectal anastomosis with positioning of the small bowel in the right quadrant (vs the left on 12/30). No fever, chills, shortness of breath, or chest pain.      Post-Op Info:  Procedure(s) (LRB):  LAPAROTOMY, EXPLORATORY, revision of ileorectal anastomosis (N/A)  ANASTOMOSIS, ILEORECTAL   5 Days Post-Op     Interval History: NAEON. AF. VSS. Pain is well controlled. Passing gas and having bowel movements. Nausea much improved form yesterday, still with decreased appetite. Voiding well  Medications:  Continuous Infusions:   lactated ringers 75 mL/hr at 01/13/23 1349     Scheduled Meds:   acetaminophen  650 mg Oral Q6H    atorvastatin  20 mg Oral Daily    enoxaparin  40 mg Subcutaneous Daily    famotidine (PF)  20 mg Intravenous BID    hydroCHLOROthiazide  12.5 mg Oral Daily    ibuprofen  400 mg Oral Q6H    lisinopriL  20 mg Oral Daily    methocarbamoL  500 mg Oral QID     PRN Meds:hydrALAZINE, HYDROmorphone, HYDROmorphone, HYDROmorphone, iohexol, labetalol, melatonin, naloxone, ondansetron, prochlorperazine, simethicone, sodium chloride 0.9%     Review of patient's allergies indicates:   Allergen Reactions    Codeine      Itching with Codeine , Oxycodone     Oxycodone      Itching      Objective:      Vital Signs (Most Recent):  Temp: 98.2 °F (36.8 °C) (01/14/23 0515)  Pulse: 62 (01/14/23 0654)  Resp: 18 (01/14/23 0515)  BP: (!) 167/94 (01/14/23 0515)  SpO2: (!) 93 % (01/14/23 0515) Vital Signs (24h Range):  Temp:  [96.1 °F (35.6 °C)-99.1 °F (37.3 °C)] 98.2 °F (36.8 °C)  Pulse:  [62-98] 62  Resp:  [16-18] 18  SpO2:  [93 %-96 %] 93 %  BP: (131-167)/(82-94) 167/94     Weight: 83.9 kg (185 lb)  Body mass index is 26.54 kg/m².    Intake/Output - Last 3 Shifts         01/12 0700  01/13 0659 01/13 0700  01/14 0659 01/14 0700  01/15 0659           Stool Occurrence 1 x              Physical Exam  Vitals reviewed.   Constitutional:       Appearance: Normal appearance.   Cardiovascular:      Rate and Rhythm: Normal rate.      Pulses: Normal pulses.   Pulmonary:      Effort: Pulmonary effort is normal.   Abdominal:      Palpations: Abdomen is soft.      Tenderness: There is no abdominal tenderness.      Comments: Dresings CDI. Sutures intact.    Skin:     General: Skin is warm and dry.   Neurological:      General: No focal deficit present.      Mental Status: He is alert and oriented to person, place, and time.       Significant Labs:  I have reviewed all pertinent lab results within the past 24 hours.  CBC:   Recent Labs   Lab 01/14/23 0605   WBC 4.39   RBC 4.62   HGB 13.6*   HCT 41.4      MCV 90   MCH 29.4   MCHC 32.9       CMP:   Recent Labs   Lab 01/14/23 0605   GLU 93   CALCIUM 8.9   ALBUMIN 2.9*   PROT 5.8*   *   K 3.4*   CO2 27      BUN 12   CREATININE 0.7   ALKPHOS 72   ALT 35   AST 28   BILITOT 3.3*       Coagulation: No results for input(s): LABPROT, INR, APTT in the last 168 hours.    Significant Diagnostics:  I have reviewed all pertinent imaging results/findings within the past 24 hours.  I have reviewed and interpreted all pertinent imaging results/findings within the past 24 hours.    Assessment/Plan:     * SBO (small bowel obstruction)  63 yo male with concern for mechanical  obstruction and volvulus of the small bowel about a previous ileorectal anastomosis. Given the failure of prior conservative management and worsening imaging findings with persistent symptoms, we will proceed with exploratory laparotomy and lysis of adhesions. Indications for surgery, including risks and benefits discussed with Mr. Prescott, and he would like to proceed. Patient had procedure: exlap with revison of iliorectal anastomosis (1-9-23).     - Pain multimodal regimen  - DVT prophylaxis Lovenox   - PT/OT/ambulate   - IS  - Nausea PRN  - Labs daily  -Regular diet  - Home meds restarted    Dispo: likely discharge home in the evening    Hypokalemia  - Replete PRN  - Continue to monitor with daily CMP        Khoa Lopez MD  General Surgery  [facility] - [facility]

## 2023-01-17 ENCOUNTER — TELEPHONE (OUTPATIENT)
Dept: SURGERY | Facility: CLINIC | Age: 63
End: 2023-01-17

## 2023-01-17 LAB
FINAL PATHOLOGIC DIAGNOSIS: NORMAL
Lab: NORMAL

## 2023-01-17 RX ORDER — ONDANSETRON 4 MG/1
4 TABLET, ORALLY DISINTEGRATING ORAL EVERY 8 HOURS PRN
COMMUNITY
End: 2024-03-26

## 2023-01-17 NOTE — TELEPHONE ENCOUNTER
----- Message from Darius Tremayne Brown sent at 1/17/2023  1:38 PM CST -----  Regarding: Refill  Contact: Liset(wife) @ [phone]  Rx Refill/Request    Is this a Refill or New Rx: Refill       Rx Name and Strength: ondansetron (ZOFRAN-ODT) 4 MG TbDL    Preferred Pharmacy with phone number:     [id number] [place] [facility] [place] [address] [facility] AT [facility] [place] [address] [address]  Phone: [phone] Fax: [phone]     Communication Preference:Liset(wife) @ [phone]    Additional Information: Pt has only 2 tablets left for today.

## 2023-01-17 NOTE — PLAN OF CARE
[facility]  Discharge Final Note    Primary Care Provider: Radha Antoine MD    Expected Discharge Date: 1/14/2023    Final Discharge Note (most recent)       Final Note - 01/17/23 1449          Final Note    Assessment Type Final Discharge Note     Anticipated Discharge Disposition Home or Self Care     What phone number can be called within the next 1-3 days to see how you are doing after discharge? --   [phone]    Hospital Resources/Appts/Education Provided Appointments scheduled and added to AVS                     Important Message from Medicare             Contact Info       Garrick Hennessy MD   Specialty: General Surgery    [address]  [facility] [address]   Phone: [phone]       Next Steps: Schedule an appointment as soon as possible for a visit in 1 week(s)    Instructions: Call to make appointment in 1-2 weeks to remove staple and follow  up on progress          Future Appointments   Date Time Provider Department Center   1/31/2023  1:15 PM [facility]     Patient discharged home to care of family on 1/14/23.      Jeanine Naqvi RNCM  Case Management  [facility]  [phone]

## 2023-01-17 NOTE — TELEPHONE ENCOUNTER
Rx called to [facility] per Dr. Hennessy for Zofran ODT 4mg tablet.  He will f/u as scheduled for his PO appointment on 1/31/23.  Pt and wife are aware.

## 2023-01-23 NOTE — ANESTHESIA POSTPROCEDURE EVALUATION
Anesthesia Post Evaluation    Patient: Jenni Prescott     Procedure(s) Performed: Procedure(s) (LRB):  LAPAROTOMY, EXPLORATORY, revision of ileorectal anastomosis (N/A)  ANASTOMOSIS, ILEORECTAL    Final Anesthesia Type: general      Patient location during evaluation: PACU  Patient participation: Yes- Able to Participate  Level of consciousness: awake  Post-procedure vital signs: reviewed and stable  Pain management: adequate  Airway patency: patent    PONV status at discharge: No PONV  Anesthetic complications: no      Cardiovascular status: blood pressure returned to baseline  Respiratory status: unassisted  Hydration status: euvolemic  Follow-up not needed.          Vitals Value Taken Time   /92 01/14/23 1204   Temp 37.3 °C (99.1 °F) 01/14/23 1204   Pulse 72 01/14/23 1204   Resp 19 01/14/23 1204   SpO2 94 % 01/14/23 1204         Event Time   Out of Recovery 21:45:00         Pain/Milly Score: No data recorded

## 2023-01-31 ENCOUNTER — OFFICE VISIT (OUTPATIENT)
Dept: SURGERY | Facility: CLINIC | Age: 63
End: 2023-01-31

## 2023-01-31 VITALS
OXYGEN SATURATION: 96 % | SYSTOLIC BLOOD PRESSURE: 119 MMHG | HEIGHT: 70 IN | BODY MASS INDEX: 26.22 KG/M2 | HEART RATE: 76 BPM | DIASTOLIC BLOOD PRESSURE: 74 MMHG | WEIGHT: 183.19 LBS

## 2023-01-31 DIAGNOSIS — Z48.89 POSTOPERATIVE VISIT: Primary | ICD-10-CM

## 2023-01-31 PROCEDURE — 99024 PR POST-OP FOLLOW-UP VISIT: ICD-10-PCS | Mod: S$GLB,,, | Performed by: SURGERY

## 2023-01-31 PROCEDURE — 99999 PR PBB SHADOW E&M-EST. PATIENT-LVL III: CPT | Mod: PBBFAC,,, | Performed by: SURGERY

## 2023-01-31 PROCEDURE — 99024 POSTOP FOLLOW-UP VISIT: CPT | Mod: S$GLB,,, | Performed by: SURGERY

## 2023-01-31 PROCEDURE — 99999 PR PBB SHADOW E&M-EST. PATIENT-LVL III: ICD-10-PCS | Mod: PBBFAC,,, | Performed by: SURGERY

## 2023-01-31 NOTE — PROGRESS NOTES
01/31/2023     Jenni Prescott Jr. 62 y.o. [id number]     CC: s/p exploratory laparotomy with revision of ileocolic anastomosis    HPI: Jenni Prescott Jr. is a 62 y.o. male who presents to clinic s/p exploratory laparotomy with revision of ileocolic anastomosis on 1/9/23. Patient's pain is well controlled without pain medication. Tolerating regular diet. Since surgery patient refers he had increased bowel movements. About one every 3 hours. Otherwise no other complaints.    Past Medical History:   Diagnosis Date    GERD (gastroesophageal reflux disease)     Taking Protonix    Hyperlipidemia     Hypertension     Neuropathic pain of right thigh     History      Prior to Admission medications    Medication Sig Start Date End Date Taking? Authorizing Provider   atorvastatin (LIPITOR) 20 MG tablet Take 1 tablet (20 mg total) by mouth once daily.  Patient not taking: Reported on 1/9/2023 3/30/20 3/30/21  Radha Antoine MD   HYDROmorphone (DILAUDID) 2 MG tablet Take 1 tablet (2 mg total) by mouth every 4 (four) hours as needed for Pain. 1/14/23   Jenni Patrick MD   lisinopriL-hydrochlorothiazide (PRINZIDE,ZESTORETIC) 20-12.5 mg per tablet Take 1 tablet by mouth once daily. 1/14/23   Javy Kern MD   ondansetron (ZOFRAN-ODT) 4 MG TbDL Dissolve 2 tablets by mouth 2 (two) times daily as needed (nausea). 1/14/23   Javy Kern MD   ondansetron (ZOFRAN-ODT) 4 MG TbDL Take 4 mg by mouth every 8 (eight) hours as needed (Nausea).    Historical Provider   pantoprazole (PROTONIX) 40 MG tablet Take 1 tablet (40 mg total) by mouth once daily. 1/14/23 1/14/24  Javy Kern MD      Past Surgical History:   Procedure Laterality Date    ANASTOMOSIS OF ILEUM TO RECTUM  1/9/2023    Procedure: ANASTOMOSIS, ILEORECTAL;  Surgeon: Garrick Hennessy MD;  Location: [facility] OR [address] [place] [address];  Service: General;;    COLONOSCOPY N/A 1/8/2018    Procedure: COLONOSCOPY;  Surgeon: Carson Yañez Jr., MD;  Location: [facility]  ENDO;  Service: Endoscopy;  Laterality: N/A;    COLONOSCOPY N/A 2/19/2018    Procedure: COLONOSCOPY/Polypectomy;  Surgeon: Rex Blanco MD;  Location: [facility] ENDO;  Service: Endoscopy;  Laterality: N/A;    COLONOSCOPY N/A 5/21/2018    Procedure: COLONOSCOPY/Miralax Split;  Surgeon: Malvin Lozano MD;  Location: [facility] ENDO;  Service: Endoscopy;  Laterality: N/A;    COLONOSCOPY N/A 6/11/2018    Procedure: COLONOSCOPY;  Surgeon: Papa Lopez MD;  Location: [facility] ENDO (4TH FLR);  Service: Endoscopy;  Laterality: N/A;  Miralax prep-ok per Dr Lopez    FLEXIBLE SIGMOIDOSCOPY N/A 3/11/2019    Procedure: SIGMOIDOSCOPY, FLEXIBLE;  Surgeon: Wilfred Rahman MD;  Location: [facility] (4TH FLR);  Service: Endoscopy;  Laterality: N/A;    INSERTION OF CATHETER Left 9/5/2018    Procedure: Insertion-Catheter;  Surgeon: Mohan Ventura MD;  Location: [address];  Service: Urology;  Laterality: Left;    KNEE SURGERY Bilateral     states he has screws    KNEE SURGERY      3 reconstructive surgeries 20yrs go     LAPAROSCOPIC TOTAL COLECTOMY Left 9/5/2018    Procedure: COLECTOMY, TOTAL, LAPAROSCOPIC - lap total;  Surgeon: Papa Lopez MD;  [address];  Service: Colon and Rectal;  Laterality: Left;  preop center  DIFFICULT INTUBATION GLIDESCOPE USED    LAPAROTOMY, EXPLORATORY N/A 1/9/2023    Procedure: LAPAROTOMY, EXPLORATORY, revision of ileorectal anastomosis;  Surgeon: Garrick Hennessy MD;  Location: [address];  Service: General;  Laterality: N/A;    WRIST SURGERY      2015     Family History   Problem Relation Age of Onset    Heart disease Mother 40    Cancer Father     Lung cancer Father     Hyperlipidemia Brother     Hypertension Brother     Anesthesia problems Neg Hx        reports that he has never smoked. He has never used smokeless tobacco. He reports current drug use. Drug: Marijuana. He reports that he does not drink alcohol.   Review of patient's allergies indicates:    Allergen Reactions    Codeine      Itching with Codeine , Oxycodone     Oxycodone      Itching            ROS:  Review of Systems   Constitutional:  Negative for chills and fever.   HENT: Negative.     Eyes: Negative.    Respiratory:  Negative for cough and shortness of breath.    Cardiovascular: Negative.  Negative for chest pain.   Gastrointestinal:  Negative for nausea and vomiting.   Genitourinary: Negative.    Musculoskeletal: Negative.    Skin:  Negative for itching and rash.       Physical Exam:   Physical Exam  Constitutional:       General: He is not in acute distress.     Appearance: Normal appearance.   HENT:      Mouth/Throat:      Mouth: Mucous membranes are moist.   Eyes:      Extraocular Movements: Extraocular movements intact.   Cardiovascular:      Rate and Rhythm: Normal rate and regular rhythm.   Pulmonary:      Effort: Pulmonary effort is normal. No respiratory distress.   Abdominal:      General: There is no distension.      Palpations: Abdomen is soft.      Tenderness: There is abdominal tenderness (incisonal, appropriate postop). There is no guarding.      Comments: Midline incision clean dry and intact. Staples across incision.    Skin:     General: Skin is warm and dry.   Neurological:      Mental Status: He is alert and oriented to person, place, and time. Mental status is at baseline.          New Data    Pathology: TERMINAL ILEUM, RESECTION:   - Portions of benign, viable small bowel including surgical margins showing   vascular congestion,      hemorrhage, serosal adhesions and mild acute serositis (clinical,   obstruction/volvulus).   - Single, benign reactive lymph node identified.   - Negative for malignancy.      Labs:  n/a    Assessment/Plan:  Doing well 3 weeks s/p exploratory laparotomy with revision of ileocolic anastomosis for small bowel obstruction. Patient is advised to avoid heavy lifting or strenuous activity for another 3 weeks. Patient may bathe and continue to take a  regular diet. Will follow-up with me on an as-needed basis. All questions answered; patient is comfortable with follow-up plan.    Case discussed with Dr. Hennessy.      Khoa Linder MD  General Surgery, PGY-1

## 2023-06-08 NOTE — DISCHARGE SUMMARY
Discharge Summary/Instructions after an Endoscopic Procedure    Patient Name: Jenni Prescott  Patient MRN: [id number]  Patient YOB: 1960    Monday, May 21, 2018  Malvin Lozano MD    RESTRICTIONS:  During your procedure today, you received medications for sedation.  These medications may affect your judgment, balance and coordination.  Therefore, for 24 hours, you have the following restrictions:     - DO NOT drive a car, operate machinery, make legal/financial decisions, sign important papers or drink alcohol.      ACTIVITY:  The following day: return to full activity including work, except no heavy lifting, straining or running for 3 days if polyps were removed.    DIET:  Eat and drink normally unless instructed otherwise.     TREATMENT FOR COMMON SIDE EFFECTS:  - Mild abdominal pain, nausea, belching, bloating or excessive gas:  rest, eat lightly and use a heating pad.  - Sore Throat: treat with throat lozenges and/or gargle with warm salt water.  - Because air was used during the procedure, expelling large amounts of air from your rectum or belching is normal.  - If a bowel prep was taken, you may not have a bowel movement for 1-3 days.  This is normal.      SYMPTOMS TO WATCH FOR AND REPORT TO YOUR PHYSICIAN:  1. Abdominal pain or bloating, other than gas cramps.  2. Chest pain.  3. Back pain.  4. Signs of infection such as: chills or fever occurring within 24 hours after the procedure.  5. Rectal bleeding, which would show as bright red, maroon, or black stools. (A tablespoon of blood from the rectum is not serious, especially if hemorrhoids are present.)  6. Vomiting.  7. Weakness or dizziness.      GO DIRECTLY TO THE NEAREST EMERGENCY ROOM IF YOU HAVE ANY OF THE FOLLOWING:     Difficulty breathing              Chills and/or fever over 101 F   Persistent vomiting and/or vomiting blood   Severe abdominal pain   Severe chest pain   Black, tarry stools   Bleeding- more than one tablespoon   Any other  symptom or condition that you feel may need urgent attention    Your doctor recommends these additional instructions:  If any biopsies were taken, your doctors clinic will contact you in 1 to 2 weeks with any results.    - Discharge patient to home (ambulatory).   - Await pathology results.   - Perform a colonoscopy today.    For questions, problems or results please call your physician - Malvin Lozano MD at Work:  [phone].    EMERGENCY PHONE NUMBER: [phone],  LAB RESULTS: [phone]    IF A COMPLICATION OR EMERGENCY SITUATION ARISES AND YOU ARE UNABLE TO REACH YOUR PHYSICIAN - GO DIRECTLY TO THE EMERGENCY ROOM.       Valtrex Counseling: I discussed with the patient the risks of valacyclovir including but not limited to kidney damage, nausea, vomiting and severe allergy.  The patient understands that if the infection seems to be worsening or is not improving, they are to call.

## 2023-10-23 NOTE — DISCHARGE INSTRUCTIONS
Diverticulosis    Diverticulosis means that small pouches have formed in the wall of your large intestine (colon). Most often, this problem causes no symptoms and is common as people age. But the pouches in the colon are at risk of becoming infected. When this happens, the condition is called diverticulitis. Although most people with diverticulosis never develop diverticulitis, it is still not uncommon. Rectal bleeding can also occur and in less common situations, a type of colon inflammation called colitis.  While most people do not have symptoms, some people with diverticulosis may have:  · Abdominal cramps and pain  · Bloating  · Constipation  · Change in bowel habits  Causes  The exact cause of diverticulosis (and diverticulitis) has not been proved, but a few things are associated with the condition:  · Low-fiber diet  · Constipation  · Lack of exercise  Your healthcare provider will talk with you about how to manage your condition. Diet changes may be all that are needed to help control diverticulosis and prevent progression to diverticulitis. If you develop diverticulitis, you will likely need other treatments.  Home care  You may be told to take fiber supplements daily. Fiber adds bulk to the stool so that it passes through the colon more easily. Stool softeners may be recommended. You may also be given medications for pain relief. Be sure to take all medications as directed.  In the past, people were told to avoid corn, nuts, and seeds. This is no longer necessary.  Follow these guidelines when caring for yourself at home:  · Eat unprocessed foods that are high in fiber. Whole grains, fruits, and vegetables are good choices.  · Drink 6 to 8 glasses of water every day unless your healthcare provider has you limit how much fluid you should have.  · Watch for changes in your bowel movements. Tell your provider if you notice any changes.  · Begin an exercise program. Ask your provider how to get started.  October 23, 2023     Nimco Thomas, [address] [place] [address]    Patient: Hussein Gutierres   YOB: 1942   Date of Visit: 10/23/2023       Dear Dr. James Kline: Thank you for referring Kamila Bonner to me for evaluation. Below are my notes for this consultation. If you have questions, please do not hesitate to call me. I look forward to following your patient along with you. Sincerely,        Guille Collier MD        CC: No Recipients    Guille Collier MD  10/23/2023 10:24 AM  Sign when Signing Visit   Assessment and Plan:     Problem List Items Addressed This Visit       Prostate cancer ([address])     2.5 yrs post RT : PSA  low          Sick sinus syndrome ([address]) - Primary    Mixed hyperlipidemia    Pacemaker    Splenic lesion     See above : ct scheduled         Abnormal CT of the abdomen (Chronic)     CT repeat in December scheduled              Preventive health issues were discussed with patient, and age appropriate screening tests were ordered as noted in patient's After Visit Summary. Personalized health advice and appropriate referrals for health education or preventive services given if needed, as noted in patient's After Visit Summary.      History of Present Illness:     Patient presents for a Medicare Wellness Visit    Pre-op as well        Patient Care Team:  Guille Collier MD as PCP - General (Family Medicine)  Papito Barnett MD (Radiation Oncology)  Umu Ma MD (Urology)     Review of Systems:     Review of Systems     Problem List:     Patient Active Problem List   Diagnosis   • Prostate cancer [facility])   • Sick sinus syndrome [facility])   • Mixed hyperlipidemia   • Pacemaker   • Splenic lesion   • Abnormal CT of the abdomen   • Narcolepsy      Past Medical and Surgical History:     Past Medical History:   Diagnosis Date   • Cancer ([address])     skin - L cheek carcinoma   • Elevated PSA    • Hyperlipidemia    • Melanoma ([address])     L cheek, surgically removed   • Generally, walking is the best.  · Get plenty of rest and sleep.  Follow-up care  Follow up with your healthcare provider, or as advised. Regular visits may be needed to check on your health. Sometimes special procedures such as colonoscopy, are needed after an episode of diverticulitis or blooding. Be sure to keep all your appointments.  If a stool sample was taken, or cultures were done, you should be told if they are positive, or if your treatment needs to be changed. You can call as directed for the results.  If X-rays were done, a radiologist will look at them. You will be told if there is a change in your treatment.  If antibiotics were prescribed, be sure to finish them all.  When to seek medical advice  Call your healthcare provider right away if any of these occur:  · Fever of 100.4°F (38°C) or higher, or as directed by your healthcare provider  · Severe cramps in the lower left side of the abdomen or pain that is getting worse  · Tenderness in the lower left side of the abdomen or worsening pain throughout the abdomen  · Diarrhea or constipation that doesn't get better within 24 hours  · Nausea and vomiting  · Bleeding from the rectum  Call 911  Call emergency services if any of the following occur:  · Trouble breathing  · Confusion  · Very drowsy or trouble awakening  · Fainting or loss of consciousness  · Rapid heart rate  · Chest pain  Date Last Reviewed: 12/30/2015 © 2000-2017 [employer]. [address] [place] [address]. All rights reserved. This information is not intended as a substitute for professional medical care. Always follow your healthcare professional's instructions.        Understanding Colon and Rectal Polyps    The colon (also called the large intestine) is a muscular tube that forms the last part of the digestive tract. It absorbs water and stores food waste. The colon is about 4 to 6 feet long. The rectum is the last 6 inches of the colon. The colon and rectum  Narcolepsy    • Pacemaker 1988    Symptomatic Bradycardia   • Prostate cancer ([address])    • Snoring      Past Surgical History:   Procedure Laterality Date   • ABDOMINAL HERNIA REPAIR N/A 1/19/2023    Procedure: REPAIR HERNIA EPIGASTRIC OPEN;  Surgeon: Bam Pleitez MD;  [facility];  Service: General   • ATRIAL CARDIAC PACEMAKER INSERTION     • CARDIAC CATHETERIZATION  02/12/2015   • CARDIAC ELECTROPHYSIOLOGY PROCEDURE N/A 3/28/2023    Procedure: Cardiac pacer generator change;  Surgeon: Negin Simmons DO;  [facility]; Service: Cardiology   • COLONOSCOPY     • INSERTION OF FIDUCIAL MARKER (TRANSRECTAL ULTRASOUND GUIDANCE) N/A 2/3/2020    Procedure: INSERTION OF FIDUCIAL MARKER (TRANSRECTAL ULTRASOUND GUIDANCE) [facility];  Surgeon: Lawson Rodríguez MD;  [facility];  Service: Urology   • IR UPPER EXTREMITY VENOGRAM- DIAGNOSTIC  11/8/2022   • [place] RPR 1ST INGUN HRNA AGE 5 YRS/> REDUCIBLE Left 12/20/2019    Procedure: INGUINAL HERNIA REPAIR;  Surgeon: Jennifer Zambrano DO;  [facility];  Service: General   • SKIN CANCER EXCISION      left cheek      Family History:     Family History   Problem Relation Age of Onset   • Coronary artery disease Mother    • Coronary artery disease Father    • Skin cancer Father    • Hyperlipidemia Father    • Cancer Father         Had bladder cancer   • Coronary artery disease Brother       Social History:     Social History     Socioeconomic History   • Marital status: /Civil Union     Spouse name: None   • Number of children: 3   • Years of education: None   • Highest education level: None   Occupational History   • None   Tobacco Use   • Smoking status: Never   • Smokeless tobacco: Never   Vaping Use   • Vaping Use: Never used   Substance and Sexual Activity   • Alcohol use:  Yes     Alcohol/week: 7.0 standard drinks of alcohol     Types: 7 Glasses of wine per week     Comment: 1 glass beer/wine daily   • Drug use: Never   • Sexual activity: Not Currently     Partners: Female     Comment: medication not effective since radiation. Has ED. Other Topics Concern   • None   Social History Narrative   • None     Social Determinants of Health     Financial Resource Strain: Low Risk  (10/18/2023)    Overall Financial Resource Strain (CARDIA)    • Difficulty of Paying Living Expenses: Not hard at all   Food Insecurity: Not on file   Transportation Needs: No Transportation Needs (10/18/2023)    PRAPARE - Transportation    • Lack of Transportation (Medical): No    • Lack of Transportation (Non-Medical): No   Physical Activity: Not on file   Stress: Not on file   Social Connections: Not on file   Intimate Partner Violence: Not on file   Housing Stability: Not on file      Medications and Allergies:     Current Outpatient Medications   Medication Sig Dispense Refill   • atorvastatin (LIPITOR) 20 mg tablet TAKE 1 TABLET BY MOUTH  DAILY IN THE EARLY MORNING 90 tablet 3   • cholecalciferol (VITAMIN D3) 1,000 units tablet Take 1,000 Units by mouth daily     • Cyanocobalamin (B-12) 1000 MCG CAPS Take by mouth daily     • fluticasone (FLONASE) 50 mcg/act nasal spray SPRAY 1 SPRAY INTO EACH NOSTRIL TWICE A DAY 16 mL 5   • ipratropium (ATROVENT) 0.06 % nasal spray 2 sprays into each nostril 4 (four) times a day 15 mL 5   • mupirocin (BACTROBAN) 2 % ointment Apply topically 3 (three) times a day 22 g 0     No current facility-administered medications for this visit.      Allergies   Allergen Reactions   • Chlorhexidine Hives and Rash      Immunizations:     Immunization History   Administered Date(s) Administered   • COVID-19 MODERNA VACC 0.5 ML IM 01/11/2021, 02/08/2021, 11/01/2021, 05/06/2022   • H1N1, All Formulations 01/19/2010   • Hep A, adult 03/06/2014   • INFLUENZA 10/09/2014, 11/25/2016, 10/05/2018, 10/30/2019, 10/10/2020, 10/01/2021, 09/21/2022   • Influenza Split High Dose Preservative Free IM 10/29/2015   • Influenza, high dose seasonal 0.7 mL have a smooth lining composed of millions of cells. Changes in these cells can lead to growths in the colon that can become cancerous and should be removed. Multiple tests are available to screen for colon cancer, but the colonoscopy is the most recommended test. During colonoscopy, these polyps can be removed. How often you need this test depends on many things including your condition, your family history, symptoms, and what the findings were at the previous colonoscopy.   When the colon lining changes  Changes that happen in the cells that line the colon or rectum can lead to growths called polyps. Over a period of years, polyps can turn cancerous. Removing polyps early may prevent cancer from ever forming.  Polyps  Polyps are fleshy clumps of tissue that form on the lining of the colon or rectum. Small polyps are usually benign (not cancerous). However, over time, cells in a polyp can change and become cancerous. Certain types of polyps known as adenomatous polyps are premalignant. The risk for invasive cancer increases with the size of the polyp and certain cell and gene features. This means that they can become cancerous if they're not removed. Hyperplastic polyps are benign. They can grow quite large and not turn cancerous.   Cancer  Almost all colorectal cancers start when polyp cells begin growing abnormally. As a cancerous tumor grows, it may involve more and more of the colon or rectum. In time, cancer can also grow beyond the colon or rectum and spread to nearby organs or to glands called lymph nodes. The cells can also travel to other parts of the body. This is known as metastasis. The earlier a cancerous tumor is removed, the better the chance of preventing its spread.    Date Last Reviewed: 8/1/2016  © 0008-7831 [employer]. [address] [place] [address], [place] [address]. All rights reserved. This information is not intended as a substitute for professional medical care. Always follow your  10/30/2019, 09/21/2022   • Pneumococcal Conjugate 13-Valent 09/12/2017, 12/12/2019   • Pneumococcal Polysaccharide PPV23 01/19/2010, 02/29/2016, 04/08/2016   • Zoster 01/25/2010   • Zoster Vaccine Recombinant 06/01/2018, 06/14/2018, 08/27/2018, 08/27/2018      Health Maintenance:         Topic Date Due   • Colorectal Cancer Screening  10/05/2031   • Hepatitis C Screening  Completed         Topic Date Due   • COVID-19 Vaccine (5 - Moderna series) 07/01/2022   • Influenza Vaccine (1) 09/01/2023      Medicare Screening Tests and Risk Assessments:         Health Risk Assessment:   Patient rates overall health as very good. Patient feels that their physical health rating is same. Patient is satisfied with their life. Eyesight was rated as same. Hearing was rated as slightly worse. Patient feels that their emotional and mental health rating is same. Patients states they are sometimes angry. Patient states they are never, rarely unusually tired/fatigued. Pain experienced in the last 7 days has been none. Patient states that he has experienced no weight loss or gain in last 6 months. Fall Risk Screening: In the past year, patient has experienced: no history of falling in past year      Home Safety:  Patient does not have trouble with stairs inside or outside of their home. Patient has working smoke alarms and has working carbon monoxide detector. Home safety hazards include: none. Nutrition:   Current diet is Regular. Medications:   Patient is currently taking over-the-counter supplements. OTC medications include: see medication list. Patient is able to manage medications. Activities of Daily Living (ADLs)/Instrumental Activities of Daily Living (IADLs):   Walk and transfer into and out of bed and chair?: Yes  Dress and groom yourself?: Yes    Bathe or shower yourself?: Yes    Feed yourself?  Yes  Do your laundry/housekeeping?: Yes  Manage your money, pay your bills and track your expenses?: Yes  Make your healthcare professional's instructions.        Colonoscopy     A camera attached to a flexible tube with a viewing lens is used to take video pictures.     Colonoscopy is a test to view the inside of your lower digestive tract (colon and rectum). Sometimes it can show the last part of the small intestine (ileum). During the test, small pieces of tissue may be removed for testing. This is called a biopsy. Small growths, such as polyps, may also be removed.   Why is colonoscopy done?  The test is done to help look for colon cancer. And it can help find the source of abdominal pain, bleeding, and changes in bowel habits. It may be needed once a year, depending on factors such as your:  · Age  · Health history  · Family health history  · Symptoms  · Results from any prior colonoscopy  Risks and possible complications  These include:  · Bleeding               · A puncture or tear in the colon   · Risks of anesthesia  · A cancer lesion not being seen  Getting ready   To prepare for the test:  · Talk with your healthcare provider about the risks of the test (see below). Also ask your healthcare provider about alternatives to the test.  · Tell your healthcare provider about any medicines you take. Also tell him or her about any health conditions you may have.  · Make sure your rectum and colon are empty for the test. Follow the diet and bowel prep instructions exactly. If you dont, the test may need to be rescheduled.  · Plan for a friend or family member to drive you home after the test.     Colonoscopy provides an inside view of the entire colon.     You may discuss the results with your doctor right away or at a future visit.  During the test   The test is usually done in the hospital on an outpatient basis. This means you go home the same day. The procedure takes about 30 minutes. During that time:  · You are given relaxing (sedating) medicine through an IV line. You may be drowsy, or fully asleep.  · The healthcare  own meals?: Yes    Do your own shopping?: Yes    ADL comments: Eyes/ dental ok     Previous Hospitalizations:   Any hospitalizations or ED visits within the last 12 months?: Yes    How many hospitalizations have you had in the last year?: 3-4    Advance Care Planning:   Living will: Yes    Durable POA for healthcare: Yes    Advanced directive: Yes      Cognitive Screening:   Provider or family/friend/caregiver concerned regarding cognition?: No    PREVENTIVE SCREENINGS      Cardiovascular Screening:    General: Screening Not Indicated and History Lipid Disorder      Diabetes Screening:     General: Screening Current      Colorectal Cancer Screening:     General: Screening Current      Prostate Cancer Screening:    General: History Prostate Cancer and Screening Not Indicated      Osteoporosis Screening:    General: Screening Not Indicated      Abdominal Aortic Aneurysm (AAA) Screening:        General: Screening Not Indicated      Lung Cancer Screening:     General: Screening Not Indicated      Hepatitis C Screening:    General: Screening Current    Screening, Brief Intervention, and Referral to Treatment (SBIRT)    Screening  Typical number of drinks in a day: 1  Typical number of drinks in a week: 7  Interpretation: Low risk drinking behavior. AUDIT-C Screenin) How often did you have a drink containing alcohol in the past year? 4 or more times a week  2) How many drinks did you have on a typical day when you were drinking in the past year? 1 to 2  3) How often did you have 6 or more drinks on one occasion in the past year? never    AUDIT-C Score: 4  Interpretation: Score 4-12 (male): POSITIVE screen for alcohol misuse    AUDIT Screenin) How often during the last year have you found that you were not able to stop drinking once you had started?  0 - never  5) How often during the last year have you failed to do what was normally expected from you because of drinking? 0 - never  6) How often during the last provider will first give you a physical exam to check for anal and rectal problems.  · Then the anus is lubricated and the scope inserted.  · If you are awake, you may have a feeling similar to needing to have a bowel movement. You may also feel pressure as air is pumped into the colon. Its OK to pass gas during the procedure.  · Biopsy, polyp removal, or other treatments may be done during the test.  After the test   You may have gas right after the test. It can help to try to pass it to help prevent later bloating. Your healthcare provider may discuss the results with you right away. Or you may need to schedule a follow-up visit to talk about the results. After the test, you can go back to your normal eating and other activities. You may be tired from the sedation and need to rest for a few hours.  Date Last Reviewed: 11/1/2016  © 5347-9363 [employer]. [address] [place] [address], [place] [address]. All rights reserved. This information is not intended as a substitute for professional medical care. Always follow your healthcare professional's instructions.         year have you needed a first drink in the morning to get yourself going after a heavy drinking session? 0 - never  7) How often during the last year have you had a feeling of guilt or remorse after drinking? 0 - never  8) How often during the last year have you been unable to remember what happened the night before because you had been drinking? 0 - never  9) Have you or someone else been injured as a result of your drinking? 0 - no  10) Has a relative or friend or a doctor or another health worker been concerned about your drinking or suggested you cut down? 0 - no    AUDIT Score: 4  Interpretation: Low risk alcohol consumption    Single Item Drug Screening:  How often have you used an illegal drug (including marijuana) or a prescription medication for non-medical reasons in the past year? never    Single Item Drug Screen Score: 0  Interpretation: Negative screen for possible drug use disorder    Brief Intervention  Alcohol & drug use screenings were reviewed. No concerns regarding substance use disorder identified. No results found. Physical Exam:     /70 (BP Location: Left arm, Patient Position: Sitting, Cuff Size: Standard)   Pulse (!) 50   Temp (!) 96.7 °F (35.9 °C) (Temporal)   Ht 5' 7.5" (1.715 m)   Wt 65 kg (143 lb 6.4 oz)   SpO2 99%   BMI 22.13 kg/m²     Physical Exam     MD Arpan Henry MD  10/23/2023 10:16 AM  Sign when Signing Visit  Edy Mayes    NAME: Nya Guevara  AGE: 80 y.o. SEX: male  : 1942     DATE: 10/23/2023    Family Practice Pre-Operative Evaluation     Asked to evaluate by Dr. Chuy Meyer for pre-op clearance        Chief Complaint: Pre-operative Evaluation     Surgery: repair ectropion   Anticipated Date of Surgery: 11/10/23     History of Present Illness:     Patient has no prior history of bleeding issues or blood clots.  Chronic conditions, medications and allergies were reviewed. There is no currently active infectious process. Assessment of Cardiac Risk:  No unstable or severe angina or MI in the last 6 weeks or history of stent placement in the last year   No decompensated heart failure (e.g. New onset heart failure, NYHA functional class IV heart failure, or worsening existing heart failure) in past 3 mos. No severe heart valve disease including aortic stenosis or symptomatic mitral stenosis     Exercise Capacity:  Able to walk 4 blocks without symptoms  Able to walk 2 flights without symptoms    NO Prior Anesthesia Reactions       No prolonged steroid use in past 6 mos. P.A.T. If done reviewed. Review of Systems:     Review of Systems    Current Problem List:     Patient Active Problem List   Diagnosis   • Prostate cancer ([address])   • Non-recurrent unilateral inguinal hernia without obstruction or gangrene   • Leukopenia   • Sick sinus syndrome ([address])   • Mixed hyperlipidemia   • Pacemaker   • Left inguinal pain   • Epigastric hernia   • Splenic lesion   • Abnormal CT of the abdomen   • Fractured atrial pacemaker lead wire   • Narcolepsy   • Pacemaker generator end of life       Allergies:      Allergies   Allergen Reactions   • Chlorhexidine Hives and Rash       Current Medications:       Current Outpatient Medications:   •  atorvastatin (LIPITOR) 20 mg tablet, TAKE 1 TABLET BY MOUTH  DAILY IN THE EARLY MORNING, Disp: 90 tablet, Rfl: 3  •  cholecalciferol (VITAMIN D3) 1,000 units tablet, Take 1,000 Units by mouth daily, Disp: , Rfl:   •  Cyanocobalamin (B-12) 1000 MCG CAPS, Take by mouth daily, Disp: , Rfl:   •  fluticasone (FLONASE) 50 mcg/act nasal spray, SPRAY 1 SPRAY INTO EACH NOSTRIL TWICE A DAY, Disp: 16 mL, Rfl: 5  •  ipratropium (ATROVENT) 0.06 % nasal spray, 2 sprays into each nostril 4 (four) times a day, Disp: 15 mL, Rfl: 5  •  mupirocin (BACTROBAN) 2 % ointment, Apply topically 3 (three) times a day, Disp: 22 g, Rfl: 0    Past Medical History:       Past Medical History:   Diagnosis Date   • Cancer ([address])     skin - L cheek carcinoma   • Elevated PSA    • Hyperlipidemia    • Melanoma ([address])     L cheek, surgically removed   • Narcolepsy    • Pacemaker 1988    Symptomatic Bradycardia   • Prostate cancer ([address])    • Snoring         Past Surgical History:   Procedure Laterality Date   • ABDOMINAL HERNIA REPAIR N/A 1/19/2023    Procedure: REPAIR HERNIA EPIGASTRIC OPEN;  Surgeon: Bernardo Dove MD;  [facility];  Service: General   • ATRIAL CARDIAC PACEMAKER INSERTION     • CARDIAC CATHETERIZATION  02/12/2015   • CARDIAC ELECTROPHYSIOLOGY PROCEDURE N/A 3/28/2023    Procedure: Cardiac pacer generator change;  Surgeon: Haroon Hope DO;  [facility];   Service: Cardiology   • COLONOSCOPY     • INSERTION OF FIDUCIAL MARKER (TRANSRECTAL ULTRASOUND GUIDANCE) N/A 2/3/2020    Procedure: INSERTION OF FIDUCIAL MARKER (TRANSRECTAL ULTRASOUND GUIDANCE) [place];  Surgeon: Sydni Casanova MD;  [facility];  Service: Urology   • IR UPPER EXTREMITY VENOGRAM- DIAGNOSTIC  11/8/2022   • [place] RPR 1ST INGUN HRNA AGE 5 YRS/> REDUCIBLE Left 12/20/2019    Procedure: INGUINAL HERNIA REPAIR;  Surgeon: Teresa Hebert DO;  [facility];  Service: General   • SKIN CANCER EXCISION      left cheek        Family History   Problem Relation Age of Onset   • Coronary artery disease Mother    • Coronary artery disease Father    • Skin cancer Father    • Hyperlipidemia Father    • Cancer Father         Had bladder cancer   • Coronary artery disease Brother         Social History     Socioeconomic History   • Marital status: /Civil Union     Spouse name: Not on file   • Number of children: 3   • Years of education: Not on file   • Highest education level: Not on file   Occupational History   • Not on file   Tobacco Use   • Smoking status: Never   • Smokeless tobacco: Never   Vaping Use   • Vaping Use: Never used Substance and Sexual Activity   • Alcohol use: Yes     Alcohol/week: 7.0 standard drinks of alcohol     Types: 7 Glasses of wine per week     Comment: 1 glass beer/wine daily   • Drug use: Never   • Sexual activity: Not Currently     Partners: Female     Comment: medication not effective since radiation. Has ED. Other Topics Concern   • Not on file   Social History Narrative   • Not on file     Social Determinants of Health     Financial Resource Strain: Low Risk  (10/18/2023)    Overall Financial Resource Strain (CARDIA)    • Difficulty of Paying Living Expenses: Not hard at all   Food Insecurity: Not on file   Transportation Needs: No Transportation Needs (10/18/2023)    PRAPARE - Transportation    • Lack of Transportation (Medical): No    • Lack of Transportation (Non-Medical): No   Physical Activity: Not on file   Stress: Not on file   Social Connections: Not on file   Intimate Partner Violence: Not on file   Housing Stability: Not on file        Physical Exam:     /70 (BP Location: Left arm, Patient Position: Sitting, Cuff Size: Standard)   Pulse (!) 50   Temp (!) 96.7 °F (35.9 °C) (Temporal)   Ht 5' 7.5" (1.715 m)   Wt 65 kg (143 lb 6.4 oz)   SpO2 99%   BMI 22.13 kg/m²     Physical Exam    Operative site has been examined and clear of skin infection and inflammation. Assessment & Recommendations:     Patient is cleared for surgery as detailed above.      Surgical Procedure risk category: ***    Patient specific operative risk categegory: ***

## 2023-11-01 ENCOUNTER — TELEPHONE (OUTPATIENT)
Dept: SURGERY | Facility: CLINIC | Age: 63
End: 2023-11-01

## 2023-11-01 NOTE — TELEPHONE ENCOUNTER
Pt's wife called to report that he has been having nausea and vomiting since last night.  He had multiple emesis episodes overnight and is still nauseated.  Trying to stay hydrated with water and Pedilyte.  He is s/p Exp Lap with revision of Ileorectal Anastomosis and SBO Volvulus from 1/9/23.  He has felt well since surgery and is alarmed that nausea and vomiting has returned.  Per Stepan Alonzo PA-C, he should be evaluated in the ER for continued N/V.  She is reluctant to bring him to the ER, and was instructed on S/S of SBO:  N/V, abdominal pain, inability to pass gas or have a BM.  She will monitor for these signs and symptoms and will go to the ER with worsening symptoms.  She verbalized understanding.

## 2023-11-01 NOTE — TELEPHONE ENCOUNTER
----- Message from Brooklynn Murray sent at 11/1/2023 12:24 PM CDT -----  Regarding: call back  Contact: [phone]  Pt wife calling in requesting call back  regarding throwing up all night please call to discuss further

## 2024-02-26 ENCOUNTER — PATIENT MESSAGE (OUTPATIENT)
Dept: SURGERY | Facility: CLINIC | Age: 64
End: 2024-02-26

## 2024-03-12 ENCOUNTER — TELEPHONE (OUTPATIENT)
Dept: INTERNAL MEDICINE | Facility: CLINIC | Age: 64
End: 2024-03-12

## 2024-03-12 NOTE — TELEPHONE ENCOUNTER
----- Message from Guadalupe Vigil sent at 3/12/2024  1:10 PM CDT -----  Type:  Needs Medical Advice    Who Called:  Pt wife     Would the patient rather a call back or a response via [facility]? Callback   Best Call Back Number:  [phone]  Additional Information: Pt is requesting a callback in regards to an appt

## 2024-03-27 DIAGNOSIS — I10 ESSENTIAL HYPERTENSION: ICD-10-CM

## 2024-05-01 ENCOUNTER — OFFICE VISIT (OUTPATIENT)
Dept: INTERNAL MEDICINE | Facility: CLINIC | Age: 64
End: 2024-05-01

## 2024-05-01 VITALS
OXYGEN SATURATION: 96 % | HEIGHT: 70 IN | HEART RATE: 62 BPM | BODY MASS INDEX: 27.94 KG/M2 | SYSTOLIC BLOOD PRESSURE: 138 MMHG | RESPIRATION RATE: 13 BRPM | TEMPERATURE: 99 F | WEIGHT: 195.13 LBS | DIASTOLIC BLOOD PRESSURE: 94 MMHG

## 2024-05-01 DIAGNOSIS — I10 ESSENTIAL HYPERTENSION: Primary | ICD-10-CM

## 2024-05-01 DIAGNOSIS — E66.3 OVERWEIGHT (BMI 25.0-29.9): ICD-10-CM

## 2024-05-01 PROCEDURE — 99214 OFFICE O/P EST MOD 30 MIN: CPT | Mod: S$PBB,,, | Performed by: NURSE PRACTITIONER

## 2024-05-01 PROCEDURE — 99999 PR PBB SHADOW E&M-EST. PATIENT-LVL IV: CPT | Mod: PBBFAC,,, | Performed by: NURSE PRACTITIONER

## 2024-05-01 PROCEDURE — 99214 OFFICE O/P EST MOD 30 MIN: CPT | Mod: PBBFAC | Performed by: NURSE PRACTITIONER

## 2024-05-01 RX ORDER — LISINOPRIL AND HYDROCHLOROTHIAZIDE 12.5; 2 MG/1; MG/1
1 TABLET ORAL DAILY
Qty: 30 TABLET | Refills: 0 | Status: SHIPPED | OUTPATIENT
Start: 2024-05-01 | End: 2024-05-16 | Stop reason: SDUPTHER

## 2024-05-01 NOTE — PROGRESS NOTES
"Subjective     Patient ID: Jenni Prescott Jr. is a 63 y.o. male.    Chief Complaint: Medication Refill    Pt of Dr. Keane, here for, "need to renew blood pressure script".    Has appt with PCP on 5-17-24    Has been out of meds for 2 months      Review of Systems   Constitutional:  Negative for activity change, appetite change and unexpected weight change.   HENT:  Negative for hearing loss and voice change.    Eyes:  Negative for visual disturbance.   Respiratory:  Negative for apnea, cough, chest tightness and shortness of breath.    Cardiovascular:  Negative for chest pain, palpitations and leg swelling.   Gastrointestinal:  Negative for abdominal distention, abdominal pain, blood in stool, constipation, diarrhea, nausea and vomiting.   Endocrine: Negative for cold intolerance, heat intolerance, polydipsia, polyphagia and polyuria.   Genitourinary:  Negative for difficulty urinating, dysuria and penile pain.   Musculoskeletal:  Negative for arthralgias and myalgias.   Integumentary:  Negative for color change, pallor, rash and wound.   Allergic/Immunologic: Negative for environmental allergies and food allergies.   Neurological:  Negative for dizziness, weakness, light-headedness, numbness and headaches.   Hematological:  Negative for adenopathy. Does not bruise/bleed easily.   Psychiatric/Behavioral:  Negative for agitation, behavioral problems, sleep disturbance and suicidal ideas.      Review of patient's allergies indicates:   Allergen Reactions    Codeine      Itching with Codeine , Oxycodone     Oxycodone      Itching        Current Outpatient Medications:     lisinopriL-hydrochlorothiazide (PRINZIDE,ZESTORETIC) 20-12.5 mg per tablet, Take 1 tablet by mouth once daily., Disp: 30 tablet, Rfl: 11    Patient Active Problem List   Diagnosis    Fall    Lumbar transverse process fracture    Fall    Multiple rib fractures    Pneumothorax, closed, traumatic    Multiple closed pelvic fractures with disruption of " pelvic ring    Distal radius fracture, right    Range of motion deficit    Right wrist pain    Class 1 obesity with body mass index (BMI) of 30.0 to 30.9 in adult    Essential hypertension    History of prediabetes    Screening for colorectal cancer    Colon polyp    Special screening for malignant neoplasms, colon    Acid reflux    Serum total bilirubin elevated    Adenomatous polyp    Screening for colon cancer       Past Medical History:   Diagnosis Date    GERD (gastroesophageal reflux disease)     Taking Protonix    Hyperlipidemia     Hypertension     Neuropathic pain of right thigh     History       Past Surgical History:   Procedure Laterality Date    ANASTOMOSIS OF ILEUM TO RECTUM  1/9/2023    Procedure: ANASTOMOSIS, ILEORECTAL;  Surgeon: Garrick Hennessy MD;  Location: [facility] OR 2ND FLR;  Service: General;;    COLONOSCOPY N/A 1/8/2018    Procedure: COLONOSCOPY;  Surgeon: Carson Yañze Jr., MD;  Location: [facility];  Service: Endoscopy;  Laterality: N/A;    COLONOSCOPY N/A 2/19/2018    Procedure: COLONOSCOPY/Polypectomy;  Surgeon: Rex Blanco MD;  Location: [facility];  Service: Endoscopy;  Laterality: N/A;    COLONOSCOPY N/A 5/21/2018    Procedure: COLONOSCOPY/Miralax Split;  Surgeon: Malvin Lozano MD;  Location: [facility];  Service: Endoscopy;  Laterality: N/A;    COLONOSCOPY N/A 6/11/2018    Procedure: COLONOSCOPY;  Surgeon: Papa Lopez MD;  Location: [facility] (4TH FLR);  Service: Endoscopy;  Laterality: N/A;  Miralax prep-ok per Dr Lopez    FLEXIBLE SIGMOIDOSCOPY N/A 3/11/2019    Procedure: SIGMOIDOSCOPY, FLEXIBLE;  Surgeon: Wilfred Rahman MD;  Location: [facility] (4TH FLR);  Service: Endoscopy;  Laterality: N/A;    INSERTION OF CATHETER Left 9/5/2018    Procedure: Insertion-Catheter;  Surgeon: Mohan Ventura MD;  Location: [facility] OR 2ND FLR;  Service: Urology;  Laterality: Left;    KNEE SURGERY Bilateral     states he has screws    KNEE SURGERY      3 reconstructive  "surgeries 20yrs go     LAPAROSCOPIC TOTAL COLECTOMY Left 9/5/2018    Procedure: COLECTOMY, TOTAL, LAPAROSCOPIC - lap total;  Surgeon: Papa Lopez MD;  [facility];  Service: Colon and Rectal;  Laterality: Left;  preop center  DIFFICULT INTUBATION GLIDESCOPE USED    LAPAROTOMY, EXPLORATORY N/A 1/9/2023    Procedure: LAPAROTOMY, EXPLORATORY, revision of ileorectal anastomosis;  Surgeon: Garrick Hennessy MD;  [facility];  Service: General;  Laterality: N/A;    WRIST SURGERY      2015       Social History     Socioeconomic History    Marital status:    Tobacco Use    Smoking status: Never    Smokeless tobacco: Never   Substance and Sexual Activity    Alcohol use: No     Comment:  quit     Drug use: Yes     Types: Marijuana     Comment: a few times a week, restarted about 2 months ago    Sexual activity: Yes     Partners: Female       Family History   Problem Relation Name Age of Onset    Heart disease Mother  40    Cancer Father      Lung cancer Father      Hyperlipidemia Brother      Hypertension Brother      Anesthesia problems Neg Hx         Objective     Vitals:    05/01/24 1542   BP: (!) 138/94   Pulse: 62   Resp: 13   Temp: 98.5 °F (36.9 °C)   TempSrc: Oral   SpO2: 96%   Weight: 88.5 kg (195 lb 1.7 oz)   Height: 5' 10" (1.778 m)   PainSc: 0-No pain       Body mass index is 27.99 kg/m².    Physical Exam  Vitals reviewed.   Constitutional:       Appearance: Normal appearance.   HENT:      Head: Normocephalic.      Nose: Nose normal.      Mouth/Throat:      Mouth: Mucous membranes are moist.   Eyes:      Conjunctiva/sclera: Conjunctivae normal.   Cardiovascular:      Rate and Rhythm: Normal rate.   Pulmonary:      Effort: Pulmonary effort is normal.   Musculoskeletal:         General: Normal range of motion.      Cervical back: Normal range of motion.   Neurological:      General: No focal deficit present.      Mental Status: He is alert and oriented to person, place, and " time.   Psychiatric:         Mood and Affect: Mood normal.         Behavior: Behavior normal.         Thought Content: Thought content normal.         Judgment: Judgment normal.            Assessment and Plan     1. Essential hypertension  Overview:  Lisinopril- HCTZ  Usual BP- 130/80    Orders:  -     lisinopriL-hydrochlorothiazide (PRINZIDE,ZESTORETIC) 20-12.5 mg per tablet; Take 1 tablet by mouth once daily.  Dispense: 30 tablet; Refill: 0    2. BMI 27.0-27.9,adult  BMI reviewed    3. Overweight (BMI 25.0-29.9)  BMI reviewed.    Diet and exercise to lose weight.    Temporary refill for 30 days sent    Keep appt with PCP Dr. Keane on 5-17-24 as scheduled for further refills    Follow up in about 16 days (around 5/17/2024) for with PCP Dr. Keane as scheduled.

## 2024-05-01 NOTE — PATIENT INSTRUCTIONS
Temporary refill for 30 days sent    Keep appt with PCP Dr. Keane on 5-17-24 as scheduled for further refills

## 2024-05-16 ENCOUNTER — PATIENT MESSAGE (OUTPATIENT)
Dept: INTERNAL MEDICINE | Facility: CLINIC | Age: 64
End: 2024-05-16

## 2024-05-16 DIAGNOSIS — I10 ESSENTIAL HYPERTENSION: ICD-10-CM

## 2024-05-16 RX ORDER — LISINOPRIL AND HYDROCHLOROTHIAZIDE 12.5; 2 MG/1; MG/1
1 TABLET ORAL DAILY
Qty: 90 TABLET | Refills: 0 | Status: SHIPPED | OUTPATIENT
Start: 2024-05-16

## 2024-08-22 NOTE — PROGRESS NOTES
Assessment:       1. Essential hypertension    2. Preventative health care    3. Adenomatous polyp    4. Status post colectomy    5. Polyposis coli              Plan:             Jenni was seen today for establish care.    Diagnoses and all orders for this visit:    Essential hypertension  Chronic  Uncontrolled  Patient is not at goal today  I have reviewed lifestyle modification to achieve/maintain goals  We will adjust the current medication regimen to resume medications  Patient will follow up in 1 weeks    Preventative health care  -     Basic Metabolic Panel; Future  -     Hemoglobin A1C; Future  -     Hepatic Function Panel; Future  -     Hepatitis C Antibody; Future  -     HIV 1/2 Ag/Ab (4th Gen); Future  -     Lipid Panel; Future  -     PSA, Screening; Future  -     TSH; Future    Adenomatous polyp  -     Ambulatory referral/consult to Colorectal Surgery; Future    Status post colectomy    Polyposis coli        Shingles and pneumonia decilnes   Labs today  Fu in 4 weeks         I spent at least 40 mins with preparing to see the patient, obtaining and/or revieweing separately obtained history, preforming a examination and evaluation, counseling, communicating with other health care professional, documenting clinical information in the electronic health record,  and/or coordinating care.           Subjective:       Patient ID: Jenni Prescott Patricia is a 64 y.o. male.    Chief Complaint:   Establish Care      HPI    #Last visit/Previous Provider  This patient is new to me  Previously seen by Dr lopez   Last visit was 5/1/24    Link to History         #Interim Hx    Recently when to NP for fu of BP medicine     #Concerns Today    Est care     #Chronic Problems    Patient Active Problem List   Diagnosis    Lumbar transverse process fracture   Distal radius fracture, right     Multiple closed pelvic fractures with disruption of pelvic ring   2/2 to fall   Healed with conservative measures                   "           Class 1 obesity with body mass index (BMI) of 30.0 to 30.9 in adult  Exercise; working , riding bikes   Breakfast: skips   Lunch: sandwhich    Dinner: home cooked , nothing fried, once a week steak,  tries to do organic   Desserts/Snacks: ice creams, rare cookies   Fast Food:  rare  Sugar sweetened Beverages: water and milk, occasional AP  Physical activity: at work       Essential hypertension  Uncontrolled NOT TAKING MEDICINE                  Adenomatous polyp  Special screening for malignant neoplasms, colon  FAP like genetic syndrome   Previously followed by [facility] but NOT seen since 2018      - Repeat flexible sigmoidoscopy in 1 year for  surveillance.  - The patient will be observed post-procedure,  until all discharge criteria are met.  Attending Participation:  I personally performed the entire procedure.  Wilfred Rahman MD  3/11/2019 7:53:05 A         Health Maintenance Due   Topic Date Due    Pneumococcal Vaccines (Age 0-64) (1 of 2 - PCV) Never done    Shingles Vaccine (1 of 2) Never done    PROSTATE-SPECIFIC ANTIGEN  09/26/2018    Hemoglobin A1c (Diabetic Prevention Screening)  08/29/2021    RSV Vaccine (Age 60+ and Pregnant patients) (1 - 1-dose 60+ series) Never done    COVID-19 Vaccine (4 - 2023-24 season) 09/01/2023    Lipid Panel  10/22/2023       Health Maintenance Topics with due status: Not Due       Topic Last Completion Date    TETANUS VACCINE 09/26/2017    Influenza Vaccine Not Due           Tobacco Use: Low Risk  (8/23/2024)    Patient History     Smoking Tobacco Use: Never     Smokeless Tobacco Use: Never     Passive Exposure: Not on file           No results found for this or any previous visit.        Review of Systems   All other systems reviewed and are negative.        Objective:   BP (!) 180/100 (BP Location: Right arm, Patient Position: Sitting, BP Method: Large (Manual))   Pulse 69   Ht 5' 10" (1.778 m)   Wt 94 kg (207 lb 3.7 oz)   SpO2 98%   BMI 29.73 kg/m²         " "8/23/2024     8:37 AM 5/1/2024     3:42 PM 1/31/2023     1:35 PM 1/9/2023    10:00 PM 1/9/2023    11:33 AM   Vitals   Height 5' 10" (1.778 m) 5' 10" (1.778 m) 5' 10" (1.778 m) 5' 10" (1.778 m) 5' 10" (1.778 m)   Weight (lbs) 207.23 195.11 183.2 185 185   BMI (kg/m2) 29.7 28 26.3 26.5 26.5            Physical Exam  Vitals and nursing note reviewed.   Constitutional:       General: He is not in acute distress.     Appearance: He is well-developed. He is not ill-appearing, toxic-appearing or diaphoretic.   HENT:      Head: Normocephalic.   Eyes:      Conjunctiva/sclera: Conjunctivae normal.   Cardiovascular:      Rate and Rhythm: Normal rate and regular rhythm.      Heart sounds: Normal heart sounds. No murmur heard.     No friction rub. No gallop.   Pulmonary:      Effort: Pulmonary effort is normal. No respiratory distress.   Abdominal:      General: There is no distension.      Palpations: Abdomen is soft.   Neurological:      General: No focal deficit present.      Mental Status: He is alert and oriented to person, place, and time.             ASCVD  The ASCVD Risk score (Gary DK, et al., 2019) failed to calculate for the following reasons:    Cannot find a previous HDL lab    Cannot find a previous total cholesterol lab    Basic Labs  BMP  Lab Results   Component Value Date     (L) 01/14/2023    K 3.4 (L) 01/14/2023     01/14/2023    CO2 27 01/14/2023    BUN 12 01/14/2023    CREATININE 0.7 01/14/2023    CALCIUM 8.9 01/14/2023    ANIONGAP 8 01/14/2023    ESTGFRAFRICA >60.0 09/07/2018    EGFRNONAA >60.0 09/07/2018     Lab Results   Component Value Date    ALT 35 01/14/2023    AST 28 01/14/2023    ALKPHOS 72 01/14/2023    BILITOT 3.3 (H) 01/14/2023       Lab Results   Component Value Date    TSH 2.702 01/13/2016       Lab Results   Component Value Date    WBC 4.39 01/14/2023    HGB 13.6 (L) 01/14/2023    HCT 41.4 01/14/2023    MCV 90 01/14/2023     01/14/2023           STD  Lab Results " "  Component Value Date    OBO08EDVN Non-reactive 12/30/2022     No results found for: "RPR"  Lab Results   Component Value Date    HEPCAB Non-reactive 12/30/2022     No results found for: "LABNGO", "LABCHLA"      Lipids  Lab Results   Component Value Date    CHOL 95 (L) 10/22/2018     Lab Results   Component Value Date    HDL 40 10/22/2018     Lab Results   Component Value Date    LDLCALC 32.4 (L) 10/22/2018     Lab Results   Component Value Date    TRIG 113 10/22/2018     Lab Results   Component Value Date    CHOLHDL 42.1 10/22/2018       DM  Lab Results   Component Value Date    HGBA1C 5.5 08/29/2018       PSA  PSA, Screen (ng/mL)   Date Value   09/26/2017 0.61           Future Appointments   Date Time Provider Department Center   8/30/2024  1:00 PM Divine Rivera PA-C [facility]   9/12/2024  8:40 AM [facility]             Medication List with Changes/Refills   Current Medications    LISINOPRIL-HYDROCHLOROTHIAZIDE (PRINZIDE,ZESTORETIC) 20-12.5 MG PER TABLET    Take 1 tablet by mouth once daily.       Disclaimer:  This note has been generated using voice-recognition software. There may be grammatical or spelling errors that have been missed during proof-reading   "

## 2024-08-23 ENCOUNTER — OFFICE VISIT (OUTPATIENT)
Dept: INTERNAL MEDICINE | Facility: CLINIC | Age: 64
End: 2024-08-23

## 2024-08-23 ENCOUNTER — LAB VISIT (OUTPATIENT)
Dept: LAB | Facility: HOSPITAL | Age: 64
End: 2024-08-23
Attending: INTERNAL MEDICINE

## 2024-08-23 ENCOUNTER — TELEPHONE (OUTPATIENT)
Dept: SURGERY | Facility: CLINIC | Age: 64
End: 2024-08-23

## 2024-08-23 VITALS
HEIGHT: 70 IN | BODY MASS INDEX: 29.67 KG/M2 | DIASTOLIC BLOOD PRESSURE: 100 MMHG | HEART RATE: 69 BPM | SYSTOLIC BLOOD PRESSURE: 180 MMHG | OXYGEN SATURATION: 98 % | WEIGHT: 207.25 LBS

## 2024-08-23 DIAGNOSIS — D36.9 ADENOMATOUS POLYP: ICD-10-CM

## 2024-08-23 DIAGNOSIS — Z90.49 STATUS POST COLECTOMY: ICD-10-CM

## 2024-08-23 DIAGNOSIS — I10 ESSENTIAL HYPERTENSION: Primary | ICD-10-CM

## 2024-08-23 DIAGNOSIS — D13.91 POLYPOSIS COLI: ICD-10-CM

## 2024-08-23 DIAGNOSIS — Z00.00 PREVENTATIVE HEALTH CARE: ICD-10-CM

## 2024-08-23 PROBLEM — Z12.12 SCREENING FOR COLORECTAL CANCER: Status: RESOLVED | Noted: 2018-01-08 | Resolved: 2024-08-23

## 2024-08-23 PROBLEM — K63.5 COLON POLYP: Status: RESOLVED | Noted: 2018-02-19 | Resolved: 2024-08-23

## 2024-08-23 PROBLEM — Z12.11 SCREENING FOR COLON CANCER: Status: RESOLVED | Noted: 2019-03-11 | Resolved: 2024-08-23

## 2024-08-23 PROBLEM — Z12.11 SCREENING FOR COLORECTAL CANCER: Status: RESOLVED | Noted: 2018-01-08 | Resolved: 2024-08-23

## 2024-08-23 PROBLEM — K21.9 ACID REFLUX: Status: RESOLVED | Noted: 2018-08-29 | Resolved: 2024-08-23

## 2024-08-23 LAB
ALBUMIN SERPL BCP-MCNC: 4.1 G/DL (ref 3.5–5.2)
ALP SERPL-CCNC: 78 U/L (ref 55–135)
ALT SERPL W/O P-5'-P-CCNC: 21 U/L (ref 10–44)
ANION GAP SERPL CALC-SCNC: 8 MMOL/L (ref 8–16)
AST SERPL-CCNC: 24 U/L (ref 10–40)
BILIRUB DIRECT SERPL-MCNC: 0.4 MG/DL (ref 0.1–0.3)
BILIRUB SERPL-MCNC: 1.2 MG/DL (ref 0.1–1)
BUN SERPL-MCNC: 14 MG/DL (ref 8–23)
CALCIUM SERPL-MCNC: 9 MG/DL (ref 8.7–10.5)
CHLORIDE SERPL-SCNC: 105 MMOL/L (ref 95–110)
CHOLEST SERPL-MCNC: 182 MG/DL (ref 120–199)
CHOLEST/HDLC SERPL: 4 {RATIO} (ref 2–5)
CO2 SERPL-SCNC: 28 MMOL/L (ref 23–29)
COMPLEXED PSA SERPL-MCNC: 0.91 NG/ML (ref 0–4)
CREAT SERPL-MCNC: 0.9 MG/DL (ref 0.5–1.4)
EST. GFR  (NO RACE VARIABLE): >60 ML/MIN/1.73 M^2
ESTIMATED AVG GLUCOSE: 114 MG/DL (ref 68–131)
GLUCOSE SERPL-MCNC: 106 MG/DL (ref 70–110)
HBA1C MFR BLD: 5.6 % (ref 4–5.6)
HCV AB SERPL QL IA: NORMAL
HDLC SERPL-MCNC: 45 MG/DL (ref 40–75)
HDLC SERPL: 24.7 % (ref 20–50)
HIV 1+2 AB+HIV1 P24 AG SERPL QL IA: NORMAL
LDLC SERPL CALC-MCNC: 119.4 MG/DL (ref 63–159)
NONHDLC SERPL-MCNC: 137 MG/DL
POTASSIUM SERPL-SCNC: 3.8 MMOL/L (ref 3.5–5.1)
PROT SERPL-MCNC: 7.3 G/DL (ref 6–8.4)
SODIUM SERPL-SCNC: 141 MMOL/L (ref 136–145)
TRIGL SERPL-MCNC: 88 MG/DL (ref 30–150)
TSH SERPL DL<=0.005 MIU/L-ACNC: 2.04 UIU/ML (ref 0.4–4)

## 2024-08-23 PROCEDURE — 87389 HIV-1 AG W/HIV-1&-2 AB AG IA: CPT | Performed by: INTERNAL MEDICINE

## 2024-08-23 PROCEDURE — 80076 HEPATIC FUNCTION PANEL: CPT | Performed by: INTERNAL MEDICINE

## 2024-08-23 PROCEDURE — 80048 BASIC METABOLIC PNL TOTAL CA: CPT | Performed by: INTERNAL MEDICINE

## 2024-08-23 PROCEDURE — 84443 ASSAY THYROID STIM HORMONE: CPT | Performed by: INTERNAL MEDICINE

## 2024-08-23 PROCEDURE — 99999 PR PBB SHADOW E&M-EST. PATIENT-LVL IV: CPT | Mod: PBBFAC,,, | Performed by: INTERNAL MEDICINE

## 2024-08-23 PROCEDURE — 99214 OFFICE O/P EST MOD 30 MIN: CPT | Mod: PBBFAC,PO | Performed by: INTERNAL MEDICINE

## 2024-08-23 PROCEDURE — 86803 HEPATITIS C AB TEST: CPT | Performed by: INTERNAL MEDICINE

## 2024-08-23 PROCEDURE — 80061 LIPID PANEL: CPT | Performed by: INTERNAL MEDICINE

## 2024-08-23 PROCEDURE — 84153 ASSAY OF PSA TOTAL: CPT | Performed by: INTERNAL MEDICINE

## 2024-08-23 PROCEDURE — 83036 HEMOGLOBIN GLYCOSYLATED A1C: CPT | Performed by: INTERNAL MEDICINE

## 2024-08-23 NOTE — TELEPHONE ENCOUNTER
Appt made for pt to see Dr Ornelas. Had surgery in 2018 for FAP.. Gave pt directions to the clinic.

## 2024-08-30 ENCOUNTER — TELEPHONE (OUTPATIENT)
Dept: FAMILY MEDICINE | Facility: CLINIC | Age: 64
End: 2024-08-30

## 2024-08-30 NOTE — TELEPHONE ENCOUNTER
Patient missed his appointment with Divine KELLY On 8/30/2024 at 1:00pm for a follow up on hypertension. His BP last appointment was 180/100. Gave patient a call to see if he was going to make it to his appointment. He stated he won't be coming in because his BP has been stable due to him taking his medication. Patient stated his BP has been around 126 systolic, only one day it spiked to 140/100. Per  and Divine's request, I mentioned to the patient he should reschedule , but he refused and stated he will call to reschedule if his BP spike.

## 2024-09-12 ENCOUNTER — TELEPHONE (OUTPATIENT)
Dept: ENDOSCOPY | Facility: HOSPITAL | Age: 64
End: 2024-09-12

## 2024-09-12 NOTE — TELEPHONE ENCOUNTER
Contacted the patient to schedule an endoscopy procedure(s) colonoscopy. The patient did not answer the call and left a voice message requesting a call back.

## 2024-09-12 NOTE — TELEPHONE ENCOUNTER
"----- Message from Chantal Vaughn sent at 9/12/2024 12:16 PM CDT -----  Regarding: FW: c/scope with Dr Rahman    ----- Message -----  From: Quyen Contreras LPN  Sent: 9/10/2024  11:43 AM CDT  To: [facility]  Subject: c/scope with Dr Rahman                          Procedure: ffs    Diagnosis: Surveillance colonoscopy - Hx of colon polyps    Procedure Timing: Within 4 weeks (Urgent)    #If within 4 weeks selected, please robert as high priority#    #If greater than 12 weeks, please select "5-12 weeks" and delay sending until 3 months prior to requested date#     [facility]    Additional Scheduling Information: No scheduling concerns    Prep Specifications:Standard prep    Is the patient taking a GLP-1 Agonist:no    Have you attached a patient to this message: yes  "

## 2024-09-25 ENCOUNTER — PATIENT MESSAGE (OUTPATIENT)
Dept: INTERNAL MEDICINE | Facility: CLINIC | Age: 64
End: 2024-09-25

## 2024-09-25 DIAGNOSIS — I10 ESSENTIAL HYPERTENSION: ICD-10-CM

## 2024-09-25 NOTE — TELEPHONE ENCOUNTER
No care due was identified.  [facility] Embedded Care Due Messages. Reference number: [id number].   9/25/2024 2:57:49 PM CDT

## 2024-09-26 RX ORDER — LISINOPRIL AND HYDROCHLOROTHIAZIDE 12.5; 2 MG/1; MG/1
1 TABLET ORAL DAILY
Qty: 90 TABLET | Refills: 3 | Status: SHIPPED | OUTPATIENT
Start: 2024-09-26

## 2024-09-26 NOTE — TELEPHONE ENCOUNTER
Refill Routing Note   Medication(s) are not appropriate for processing by [facility] for the following reason(s):        Required vitals abnormal  No active prescription written by provider    [facility] action(s):  Defer             Appointments  past 12m or future 3m with PCP    Date Provider   Last Visit   8/23/2024 Sagar Keane III, MD   Next Visit   Visit date not found Sagar Keane III, MD   ED visits in past 90 days: 0        Note composed:11:40 PM 09/25/2024

## 2024-09-27 ENCOUNTER — TELEPHONE (OUTPATIENT)
Dept: ENDOSCOPY | Facility: HOSPITAL | Age: 64
End: 2024-09-27

## 2024-09-27 DIAGNOSIS — Z86.0100 HX OF COLONIC POLYPS: Primary | ICD-10-CM

## 2024-09-27 NOTE — TELEPHONE ENCOUNTER
"----- Message from Chantal Vaughn sent at 9/12/2024 12:16 PM CDT -----  Regarding: FW: c/scope with Dr Rahman    ----- Message -----  From: Quyen Contreras LPN  Sent: 9/10/2024  11:43 AM CDT  To: [facility]  Subject: c/scope with Dr Rahman                          Procedure: ffs    Diagnosis: Surveillance colonoscopy - Hx of colon polyps    Procedure Timing: Within 4 weeks (Urgent)    #If within 4 weeks selected, please robert as high priority#    #If greater than 12 weeks, please select "5-12 weeks" and delay sending until 3 months prior to requested date#     [facility]    Additional Scheduling Information: No scheduling concerns    Prep Specifications:Standard prep    Is the patient taking a GLP-1 Agonist:no    Have you attached a patient to this message: yes  "

## 2024-10-07 ENCOUNTER — TELEPHONE (OUTPATIENT)
Dept: ENDOSCOPY | Facility: HOSPITAL | Age: 64
End: 2024-10-07

## 2024-10-21 ENCOUNTER — PATIENT MESSAGE (OUTPATIENT)
Dept: INTERNAL MEDICINE | Facility: CLINIC | Age: 64
End: 2024-10-21

## 2024-11-07 ENCOUNTER — TELEPHONE (OUTPATIENT)
Dept: ENDOSCOPY | Facility: HOSPITAL | Age: 64
End: 2024-11-07

## 2024-11-07 NOTE — TELEPHONE ENCOUNTER
Referral for procedure from [facility]      Spoke to patient to schedule procedure(s) Flexible Sigmoidoscopy (Flex Sig)       Physician to perform procedure(s) Dr. RACHELL Rahman  Date of Procedure (s) 12/02/2024  Arrival Time 6:00 Am   Time of Procedure(s) 7:00 Am    Location of Procedure(s) [address]  Type of Rx Prep sent to patient: Enema  Instructions provided to patient via [facility]    Patient was informed on the following information and verbalized understanding. Screening questionnaire reviewed with patient and complete. If procedure requires anesthesia, a responsible adult needs to be present to accompany the patient home, patient cannot drive after receiving anesthesia. Appointment details are tentative, especially check-in time. Patient will receive a prep-op call 7 days prior to confirm check-in time for procedure. If applicable the patient should contact their pharmacy to verify Rx for procedure prep is ready for pick-up. Patient was advised to call the scheduling department at [phone] if pharmacy states no Rx is available. Patient was advised to call the endoscopy scheduling department if any questions or concerns arise.       Endoscopy Scheduling Department

## 2024-11-07 NOTE — TELEPHONE ENCOUNTER
"----- Message from Wilfred Rahman MD sent at 10/9/2024  1:35 PM CDT -----  Regarding: RE: c/scope with Dr Rahman  The patient has had all of his colon removed.  So sigmoidoscopy is appropriate  cbw  ----- Message -----  From: Rajeev Engel MA  Sent: 10/9/2024   8:55 AM CDT  To: Quyen Contreras LPN; Wilfred Rahman MD  Subject: FW: c/scope with Dr Rahman                      Hi,    The referral received need clarifications. Pt's last procedure was on 3/19/2019 with Dr. Rahman for flex-sig procedure report states  to repeat flex-sig in 1 year, is the Pt to have  flex-sig or colonoscopy, please advise?     Rajeev Fowler  ----- Message -----  From: Quyen Contreras LPN  Sent: 9/27/2024   4:48 PM CDT  To: Rajeev Engel MA  Subject: RE: c/scope with Dr Rahman                      It looks like a c/scope.    Quyen  ----- Message -----  From: Rajeev Engel MA  Sent: 9/27/2024   2:03 PM CDT  To: Quyen Contreras LPN  Subject: RE: c/scope with Dr Rahman                      Please advise what procedure is needed Flexible sigmoidoscopy?  ----- Message -----  From: Chantal Vaughn  Sent: 9/12/2024  12:16 PM CDT  To: Rajeev Engel MA  Subject: FW: c/scope with Dr Rahman                        ----- Message -----  From: Quyen Contreras LPN  Sent: 9/10/2024  11:43 AM CDT  To: [facility]  Subject: c/scope with Dr Rahman                          Procedure: ffs    Diagnosis: Surveillance colonoscopy - Hx of colon polyps    Procedure Timing: Within 4 weeks (Urgent)    #If within 4 weeks selected, please robert as high priority#    #If greater than 12 weeks, please select "5-12 weeks" and delay sending until 3 months prior to requested date#     Location: Hospital Based ([facility] [place] 2-Endo,  endo, [facility])    Additional Scheduling Information: No scheduling concerns    Prep Specifications:Standard prep    Is the patient taking a GLP-1 Agonist:no    Have you attached a patient to this message: yes  "

## 2024-11-21 ENCOUNTER — TELEPHONE (OUTPATIENT)
Dept: ENDOSCOPY | Facility: HOSPITAL | Age: 64
End: 2024-11-21

## 2024-11-21 NOTE — TELEPHONE ENCOUNTER
Pt scheduled for flexible sigmoidoscopy on 12/2/24. He is unable to do enema prep due to presence of hemorrhoids.what other prep do you recommend? Please advise.

## 2024-12-02 ENCOUNTER — ANESTHESIA (OUTPATIENT)
Dept: ENDOSCOPY | Facility: HOSPITAL | Age: 64
End: 2024-12-02

## 2024-12-02 ENCOUNTER — ANESTHESIA EVENT (OUTPATIENT)
Dept: ENDOSCOPY | Facility: HOSPITAL | Age: 64
End: 2024-12-02

## 2024-12-02 ENCOUNTER — HOSPITAL ENCOUNTER (OUTPATIENT)
Facility: HOSPITAL | Age: 64
Discharge: HOME | End: 2024-12-02
Attending: COLON & RECTAL SURGERY | Admitting: COLON & RECTAL SURGERY

## 2024-12-02 VITALS
HEIGHT: 70 IN | SYSTOLIC BLOOD PRESSURE: 131 MMHG | DIASTOLIC BLOOD PRESSURE: 84 MMHG | OXYGEN SATURATION: 95 % | HEART RATE: 65 BPM | TEMPERATURE: 98 F | RESPIRATION RATE: 16 BRPM | BODY MASS INDEX: 29.63 KG/M2 | WEIGHT: 207 LBS

## 2024-12-02 DIAGNOSIS — D13.91 POLYPOSIS COLI: Primary | ICD-10-CM

## 2024-12-02 DIAGNOSIS — K50.10 CROHN'S COLITIS: ICD-10-CM

## 2024-12-02 PROCEDURE — 45331 SIGMOIDOSCOPY AND BIOPSY: CPT | Mod: PT,,, | Performed by: COLON & RECTAL SURGERY

## 2024-12-02 PROCEDURE — 37000009 HC ANESTHESIA EA ADD 15 MINS: Performed by: COLON & RECTAL SURGERY

## 2024-12-02 PROCEDURE — 27201012 HC FORCEPS, HOT/COLD, DISP: Performed by: COLON & RECTAL SURGERY

## 2024-12-02 PROCEDURE — 45331 SIGMOIDOSCOPY AND BIOPSY: CPT | Mod: PT | Performed by: COLON & RECTAL SURGERY

## 2024-12-02 PROCEDURE — 88305 TISSUE EXAM BY PATHOLOGIST: CPT | Performed by: PATHOLOGY

## 2024-12-02 PROCEDURE — 63600175 PHARM REV CODE 636 W HCPCS: Performed by: NURSE ANESTHETIST, CERTIFIED REGISTERED

## 2024-12-02 PROCEDURE — D9220A PRA ANESTHESIA: Mod: ,,, | Performed by: ANESTHESIOLOGY

## 2024-12-02 PROCEDURE — 37000008 HC ANESTHESIA 1ST 15 MINUTES: Performed by: COLON & RECTAL SURGERY

## 2024-12-02 RX ORDER — GLUCAGON 1 MG
1 KIT INJECTION
Status: DISCONTINUED | OUTPATIENT
Start: 2024-12-02 | End: 2024-12-02 | Stop reason: HOSPADM

## 2024-12-02 RX ORDER — SODIUM CHLORIDE 9 MG/ML
INJECTION, SOLUTION INTRAVENOUS CONTINUOUS
Status: DISCONTINUED | OUTPATIENT
Start: 2024-12-02 | End: 2024-12-02 | Stop reason: HOSPADM

## 2024-12-02 RX ORDER — ONDANSETRON HYDROCHLORIDE 2 MG/ML
4 INJECTION, SOLUTION INTRAVENOUS DAILY PRN
Status: DISCONTINUED | OUTPATIENT
Start: 2024-12-02 | End: 2024-12-02 | Stop reason: HOSPADM

## 2024-12-02 RX ORDER — PROPOFOL 10 MG/ML
VIAL (ML) INTRAVENOUS CONTINUOUS PRN
Status: DISCONTINUED | OUTPATIENT
Start: 2024-12-02 | End: 2024-12-02

## 2024-12-02 RX ORDER — LIDOCAINE HYDROCHLORIDE 20 MG/ML
INJECTION INTRAVENOUS
Status: DISCONTINUED | OUTPATIENT
Start: 2024-12-02 | End: 2024-12-02

## 2024-12-02 RX ORDER — PROPOFOL 10 MG/ML
VIAL (ML) INTRAVENOUS
Status: DISCONTINUED | OUTPATIENT
Start: 2024-12-02 | End: 2024-12-02

## 2024-12-02 RX ADMIN — LIDOCAINE HYDROCHLORIDE 30 MG: 20 INJECTION INTRAVENOUS at 07:12

## 2024-12-02 RX ADMIN — PROPOFOL 70 MG: 10 INJECTION, EMULSION INTRAVENOUS at 07:12

## 2024-12-02 RX ADMIN — PROPOFOL 150 MCG/KG/MIN: 10 INJECTION, EMULSION INTRAVENOUS at 07:12

## 2024-12-02 NOTE — TRANSFER OF CARE
"Anesthesia Transfer of Care Note    Patient: Jenni Prescott Jr.    Procedure(s) Performed: Procedure(s) (LRB):  SIGMOIDOSCOPY, FLEXIBLE (N/A)    Patient location: PACU    Anesthesia Type: general    Transport from OR: Transported from OR on room air with adequate spontaneous ventilation    Post pain: adequate analgesia    Post assessment: no apparent anesthetic complications and tolerated procedure well    Post vital signs: stable    Level of consciousness: awake, alert and oriented    Nausea/Vomiting: no nausea/vomiting    Complications: none    Transfer of care protocol was followed      Last vitals: Visit Vitals  BP (!) 143/89   Pulse (!) 58   Temp 36.8 °C (98.2 °F)   Resp 16   Ht 5' 10" (1.778 m)   Wt 93.9 kg (207 lb)   SpO2 (!) 92%   BMI 29.70 kg/m²     "

## 2024-12-02 NOTE — H&P
COLONOSCOPY HISTORY AND PHYSICAL EXAM    Procedure : Colonoscopy      INDICATIONS: personal history of colon polyps - s/p TAC/KILEY for polyposis      Last Colonoscopy:  2019  Findings: few rectal polyps       Past Medical History:   Diagnosis Date    GERD (gastroesophageal reflux disease)     Taking Protonix    Hyperlipidemia     Hypertension     Neuropathic pain of right thigh     History     Sedation Problems: NO  Family History   Problem Relation Name Age of Onset    Heart disease Mother  40    Cancer Father      Lung cancer Father      Hyperlipidemia Brother      Hypertension Brother      Cancer Maternal Great-Grandfather          colon cancer - form of FAP    Anesthesia problems Neg Hx       Fam Hx of Sedation Problems: NO  Social History     Socioeconomic History    Marital status:    Tobacco Use    Smoking status: Never    Smokeless tobacco: Never   Substance and Sexual Activity    Alcohol use: No     Comment:  quit     Drug use: Yes     Types: Marijuana     Comment: a few times a week, restarted about 2 months ago    Sexual activity: Yes     Partners: Female   Social History Narrative    Patient is originally from [place]/university none           No  background        Working contractor                     2 Children , boys         Lives with ; wife , mother in law         Diet/Exercise ;         Exercise; working , riding bikes         Breakfast: skips     Lunch: sandwhich      Dinner: home cooked , nothing fried, once a week steak,  tries to do organic     Desserts/Snacks: ice creams, rare cookies     Fast Food:  rare    Sugar sweetened Beverages: water and milk, occasional AP    Physical activity: at work                Review of Systems - Negative except   Respiratory ROS: no dyspnea  Cardiovascular ROS: no exertional chest pain  Gastrointestinal ROS: NO abdominal discomfort,  NO rectal bleeding  Musculoskeletal ROS: no muscular  "pain  Neurological ROS: no recent stroke    Physical Exam:  BP (!) 143/89   Pulse (!) 58   Temp 98.2 °F (36.8 °C)   Resp 16   Ht 5' 10" (1.778 m)   Wt 93.9 kg (207 lb)   SpO2 (!) 92%   BMI 29.70 kg/m²   General: no distress  Head: normocephalic  Mallampati Score   Neck: supple, symmetrical, trachea midline  Lungs:  normal respiratory effort  Heart: regular rate and rhythm  Abdomen: soft, non-tender non-distented; bowel sounds normal; no masses,  no organomegaly  Extremities: no cyanosis or edema, or clubbing    ASA:  II    PLAN  COLONOSCOPY.    SedationPlan :MAC    The details of the procedure, the possible need for biopsy or polypectomy and the potential risks including bleeding, perforation, missed polyps were discussed in detail.      "

## 2024-12-02 NOTE — BRIEF OP NOTE
[facility] - [facility] ([facility])  Brief Operative Note    SUMMARY     Surgery Date: 12/2/2024     Surgeons and Role:     * Wilfred Rahman MD - Primary    Assisting Surgeon: None    Pre-op Diagnosis:  Hx of colonic polyps [Z86.010]    Post-op Diagnosis:  Post-Op Diagnosis Codes:     * Hx of colonic polyps [Z86.0100]    Procedure(s) (LRB):  SIGMOIDOSCOPY, FLEXIBLE (N/A)    Anesthesia: Choice    Implants:  * No implants in log *    Operative Findings: flex sig with biopsy taken x1    Estimated Blood Loss: * No values recorded between 12/2/2024  6:50 AM and 12/2/2024  7:22 AM *    Estimated Blood Loss has not been documented. EBL = <5cc.         Specimens:   Specimen (24h ago, onward)       Start     Ordered    12/02/24 0714  Specimen to Pathology, Surgery Gastrointestinal tract  Once        Comments: Pre-op Diagnosis: Hx of colonic polyps [Z86.010]Procedure(s):SIGMOIDOSCOPY, FLEXIBLE Number of specimens: 1Name of specimens: Jar 1: Rectal polyp     References:    Click here for ordering Quick Tip   Question Answer Comment   Procedure Type: Gastrointestinal tract    Specimen Class: Routine/Screening    Release to patient Immediate        12/02/24 0715                    [id number]

## 2024-12-02 NOTE — PLAN OF CARE
Patient Awake and alert. No distress noted. Denies pain, nausea/vomiting. Offered PO fluids, patient refused. VSS. No distress noted. D/C instructions given to patient & family. Verbals understanding.

## 2024-12-02 NOTE — DISCHARGE SUMMARY
[facility] - Surgery (2nd Fl)  Discharge Note  Short Stay    Procedure(s) (LRB):  SIGMOIDOSCOPY, FLEXIBLE (N/A)      OUTCOME: Patient tolerated treatment/procedure well without complication and is now ready for discharge.    DISPOSITION: Home or Self Care    FINAL DIAGNOSIS:  <principal problem not specified>    FOLLOWUP: In clinic    DISCHARGE INSTRUCTIONS:  No discharge procedures on file.      Clinical Reference Documents Added to Patient Instructions         Document    COLON POLYPS (ENGLISH)    COLONOSCOPY (ENGLISH)            TIME SPENT ON DISCHARGE: 10 minutes

## 2024-12-02 NOTE — PROVATION PATIENT INSTRUCTIONS
Discharge Summary/Instructions after an Endoscopic Procedure  Patient Name: Jenni Prescott  Patient MRN: [id number]  Patient YOB: 1960  Monday, December 2, 2024  Wilfred Rahman MD  Dear patient,  As a result of recent federal legislation (The Federal Cures Act), you may   receive lab or pathology results from your procedure in your [facility]   account before your physician is able to contact you. Your physician or   their representative will relay the results to you with their   recommendations at their soonest availability.  Thank you,  RESTRICTIONS:  During your procedure today, you received medications for sedation.  These   medications may affect your judgment, balance and coordination.  Therefore,   for 24 hours, you have the following restrictions:   - DO NOT drive a car, operate machinery, make legal/financial decisions,   sign important papers or drink alcohol.    ACTIVITY:  Today: no heavy lifting, straining or running due to procedural   sedation/anesthesia.  The following day: return to full activity including work.  DIET:  Eat and drink normally unless instructed otherwise.     TREATMENT FOR COMMON SIDE EFFECTS:  - Mild abdominal pain, nausea, belching, bloating or excessive gas:  rest,   eat lightly and use a heating pad.  - Sore Throat: treat with throat lozenges and/or gargle with warm salt   water.  - Because air was used during the procedure, expelling large amounts of air   from your rectum or belching is normal.  - If a bowel prep was taken, you may not have a bowel movement for 1-3 days.    This is normal.  SYMPTOMS TO WATCH FOR AND REPORT TO YOUR PHYSICIAN:  1. Abdominal pain or bloating, other than gas cramps.  2. Chest pain.  3. Back pain.  4. Signs of infection such as: chills or fever occurring within 24 hours   after the procedure.  5. Rectal bleeding, which would show as bright red, maroon, or black stools.   (A tablespoon of blood from the rectum is not serious, especially if    hemorrhoids are present.)  6. Vomiting.  7. Weakness or dizziness.  GO DIRECTLY TO THE NEAREST EMERGENCY ROOM IF YOU HAVE ANY OF THE FOLLOWING:      Difficulty breathing              Chills and/or fever over 101 F   Persistent vomiting and/or vomiting blood   Severe abdominal pain   Severe chest pain   Black, tarry stools   Bleeding- more than one tablespoon   Any other symptom or condition that you feel may need urgent attention  Your doctor recommends these additional instructions:  If any biopsies were taken, your doctors clinic will contact you in 1 to 2   weeks with any results.  - The patient will be observed post-procedure, until all discharge criteria   are met.   - Discharge patient to home (ambulatory).   - Resume regular diet indefinitely.   - Repeat flexible sigmoidoscopy in 3 years for surveillance.  For questions, problems or results please call your physician - Wilfred Rahman MD at Work:  [phone].  [facility] EMERGENCY ROOM PHONE NUMBER: [phone]  IF A COMPLICATION OR EMERGENCY SITUATION ARISES AND YOU ARE UNABLE TO REACH   YOUR PHYSICIAN - GO DIRECTLY TO THE EMERGENCY ROOM.  Wilfred Rahman MD  12/2/2024 7:22:56 AM  This report has been verified and signed electronically.  Dear patient,  As a result of recent federal legislation (The Federal Cures Act), you may   receive lab or pathology results from your procedure in your [facility]   account before your physician is able to contact you. Your physician or   their representative will relay the results to you with their   recommendations at their soonest availability.  Thank you,  PROVATION

## 2024-12-02 NOTE — ANESTHESIA PREPROCEDURE EVALUATION
12/02/2024  Jenni Prescott Jr. is a 64 y.o., male.    Past Medical History:   Diagnosis Date    GERD (gastroesophageal reflux disease)     Taking Protonix    Hyperlipidemia     Hypertension     Neuropathic pain of right thigh     History       Past Surgical History:   Procedure Laterality Date    ANASTOMOSIS OF ILEUM TO RECTUM  1/9/2023    Procedure: ANASTOMOSIS, ILEORECTAL;  Surgeon: Garrick Hennessy MD;  Location: [facility] OR [address] [place] [address];  Service: General;;    COLONOSCOPY N/A 1/8/2018    Procedure: COLONOSCOPY;  Surgeon: Carson Yañez Jr., MD;  Location: [facility];  Service: Endoscopy;  Laterality: N/A;    COLONOSCOPY N/A 2/19/2018    Procedure: COLONOSCOPY/Polypectomy;  Surgeon: Rex Blanco MD;  Location: [facility];  Service: Endoscopy;  Laterality: N/A;    COLONOSCOPY N/A 5/21/2018    Procedure: COLONOSCOPY/Miralax Split;  Surgeon: Malvin Lozano MD;  Location: [facility];  Service: Endoscopy;  Laterality: N/A;    COLONOSCOPY N/A 6/11/2018    Procedure: COLONOSCOPY;  Surgeon: Papa Lopez MD;  Location: [facility] (4TH FLR);  Service: Endoscopy;  Laterality: N/A;  Miralax prep-ok per Dr Lopez    FLEXIBLE SIGMOIDOSCOPY N/A 3/11/2019    Procedure: SIGMOIDOSCOPY, FLEXIBLE;  Surgeon: Wilfred Rahman MD;  Location: [facility] (4TH FLR);  Service: Endoscopy;  Laterality: N/A;    INSERTION OF CATHETER Left 9/5/2018    Procedure: Insertion-Catheter;  Surgeon: Mohan Ventura MD;  [address];  Service: Urology;  Laterality: Left;    KNEE SURGERY Bilateral     states he has screws    KNEE SURGERY      3 reconstructive surgeries 20yrs go     LAPAROSCOPIC TOTAL COLECTOMY Left 9/5/2018    Procedure: COLECTOMY, TOTAL, LAPAROSCOPIC - lap total;  Surgeon: Papa Lopez MD;  [address];  Service: Colon and Rectal;  Laterality: Left;  preop center  DIFFICULT  INTUBATION GLIDESCOPE USED    LAPAROTOMY, EXPLORATORY N/A 1/9/2023    Procedure: LAPAROTOMY, EXPLORATORY, revision of ileorectal anastomosis;  Surgeon: Garrick Hennessy MD;  Location: [facility] OR [address] [place] [address];  Service: General;  Laterality: N/A;    WRIST SURGERY      2015           Pre-op Assessment          Review of Systems  Anesthesia Hx:  No problems with previous Anesthesia   History of prior surgery of interest to airway management or planning:          Denies Family Hx of Anesthesia complications.    Denies Personal Hx of Anesthesia complications.                    Cardiovascular:  Exercise tolerance: good   Hypertension   Denies MI.        Denies Angina.       Denies ORR.                              Pulmonary:  Pulmonary Normal   Denies COPD.  Denies Asthma.    Denies Recent URI.                 Hepatic/GI:      Denies GERD.   No n/v  Digestion ok lately             Musculoskeletal:     Shoulder pain- pt to position himself prior to sedation            Neurological:  Neurology Normal                                          Physical Exam  General: Alert, Oriented and Well nourished    Airway:  Mallampati: II   Mouth Opening: Normal  TM Distance: Normal  Neck ROM: Normal ROM    Dental:    Chest/Lungs:  Normal Respiratory Rate    Heart:  Rate: Normal  Rhythm: Regular Rhythm        Anesthesia Plan  Type of Anesthesia, risks & benefits discussed:    Anesthesia Type: MAC, Gen Natural Airway  Intra-op Monitoring Plan: Standard ASA Monitors  Post Op Pain Control Plan: multimodal analgesia and IV/PO Opioids PRN  Informed Consent: Informed consent signed with the Patient and all parties understand the risks and agree with anesthesia plan.  All questions answered.   ASA Score: 2  Day of Surgery Review of History & Physical: H&P Update referred to the surgeon/provider.    Ready For Surgery From Anesthesia Perspective.     .

## 2024-12-03 LAB
FINAL PATHOLOGIC DIAGNOSIS: NORMAL
GROSS: NORMAL
Lab: NORMAL

## 2024-12-03 NOTE — ANESTHESIA POSTPROCEDURE EVALUATION
Anesthesia Post Evaluation    Patient: Jenni Prescott JrPatricia    Procedure(s) Performed: Procedure(s) (LRB):  SIGMOIDOSCOPY, FLEXIBLE (N/A)    Final Anesthesia Type: general      Patient location during evaluation: PACU  Patient participation: Yes- Able to Participate  Level of consciousness: awake and alert  Post-procedure vital signs: reviewed and stable  Pain management: adequate  Airway patency: patent    PONV status at discharge: No PONV  Anesthetic complications: no      Cardiovascular status: blood pressure returned to baseline  Respiratory status: unassisted, room air and spontaneous ventilation  Hydration status: euvolemic  Follow-up not needed.              Vitals Value Taken Time   /84 12/02/24 0815   Temp 36.8 °C (98.2 °F) 12/02/24 0724   Pulse 65 12/02/24 0815   Resp 16 12/02/24 0815   SpO2 95 % 12/02/24 0815         No case tracking events are documented in the log.      Pain/Milly Score: Milly Score: 10 (12/2/2024  8:15 AM)

## 2025-01-21 ENCOUNTER — PATIENT MESSAGE (OUTPATIENT)
Dept: ADMINISTRATIVE | Facility: HOSPITAL | Age: 65
End: 2025-01-21

## (undated) DEVICE — FORCEP ENDOPTH 5MM BABCOCK

## (undated) DEVICE — PENCIL VALLEYLAB TELSCP SMK

## (undated) DEVICE — SEE MEDLINE ITEM 146417

## (undated) DEVICE — SEE MEDLINE ITEM 157181

## (undated) DEVICE — Device

## (undated) DEVICE — TIP YANKAUERS BULB NO VENT

## (undated) DEVICE — DRESSING ADHESIVE ISLAND 3 X 6

## (undated) DEVICE — SOL IRR NACL .9% 3000ML

## (undated) DEVICE — BLADE 4 INCH EDGE UN-INS

## (undated) DEVICE — SEE MEDLINE ITEM 146292

## (undated) DEVICE — PAD ABD 8X10 STERILE

## (undated) DEVICE — SEE MEDLINE ITEM 146347

## (undated) DEVICE — SCOPE RECTRO MAXI LIGHT LED

## (undated) DEVICE — SUT 1 36IN PDS II VIO MONO

## (undated) DEVICE — STAPLER INT PROX TX 60X3.5MM

## (undated) DEVICE — SUT MONOCRYL 4-0 PS-1 UND

## (undated) DEVICE — ADHESIVE DERMABOND ADVANCED

## (undated) DEVICE — SOL NS 1000CC

## (undated) DEVICE — SET IRR URLGY 2LINE UNIV SPIKE

## (undated) DEVICE — LUBRICANT SURGILUBE 2 OZ

## (undated) DEVICE — GLOVE GAMMEX SURG LF PI SZ 7.5

## (undated) DEVICE — KIT ANTIFOG

## (undated) DEVICE — SUT PDS II O CTX MONO 60

## (undated) DEVICE — TRAY FOLEY 16FR INFECTION CONT

## (undated) DEVICE — ELECTRODE REM PLYHSV RETURN 9

## (undated) DEVICE — SUT CTD VICRYL 3-0 VIL BR

## (undated) DEVICE — COVER LIGHT HANDLE 80/CA

## (undated) DEVICE — NDL 22GA X1 1/2 REG BEVEL

## (undated) DEVICE — SYR ONLY LUER LOCK 20CC

## (undated) DEVICE — SEE MEDLINE ITEM 157144

## (undated) DEVICE — RELOAD PROXIMATE CUT BLUE 75MM

## (undated) DEVICE — NDL BOX COUNTER

## (undated) DEVICE — SUT CTD VICRYL BR CR/SH VIL

## (undated) DEVICE — SUT 3-0 VICRYL SH CR/8 18

## (undated) DEVICE — STAPLER ECHELON ARTC 60MM 28CM

## (undated) DEVICE — WIRE GUIDE 0.038OLD

## (undated) DEVICE — DRAPE INCISE IOBAN 2 23X17IN

## (undated) DEVICE — NDL 20GX1-1/2IN IB

## (undated) DEVICE — RELOAD ECHELON FLEX GRN 60MM

## (undated) DEVICE — SEE MEDLINE ITEM 152487

## (undated) DEVICE — SUT 2-0 NYLON D/A

## (undated) DEVICE — DRAPE ABDOMINAL TIBURON 14X11

## (undated) DEVICE — LEGGINGS 48X31 INCH

## (undated) DEVICE — DRESSING ABSRBNT ISLAND 3.6X8

## (undated) DEVICE — BOVIE SUCTION

## (undated) DEVICE — SEE MEDLINE ITEM 156902

## (undated) DEVICE — SUT 0 VICRYL / UR6 (J603)

## (undated) DEVICE — TUBING HF INSUFFLATION W/ FLTR

## (undated) DEVICE — DRESSING ADH ISLAND 3.6 X 14

## (undated) DEVICE — SUT CTD VICRYL 2-0 VIL BR

## (undated) DEVICE — CUTTER PROXIMATE BLUE 75MM

## (undated) DEVICE — SEE MEDLINE ITEM 152622

## (undated) DEVICE — SEALER LIGASURE MARYLAND 23CM